# Patient Record
Sex: FEMALE | Race: BLACK OR AFRICAN AMERICAN | NOT HISPANIC OR LATINO | Employment: FULL TIME | URBAN - METROPOLITAN AREA
[De-identification: names, ages, dates, MRNs, and addresses within clinical notes are randomized per-mention and may not be internally consistent; named-entity substitution may affect disease eponyms.]

---

## 2017-02-14 ENCOUNTER — OFFICE VISIT (OUTPATIENT)
Dept: INTERNAL MEDICINE | Facility: CLINIC | Age: 47
End: 2017-02-14
Payer: COMMERCIAL

## 2017-02-14 VITALS
HEART RATE: 104 BPM | DIASTOLIC BLOOD PRESSURE: 82 MMHG | SYSTOLIC BLOOD PRESSURE: 128 MMHG | HEIGHT: 66 IN | WEIGHT: 293 LBS | TEMPERATURE: 98 F | OXYGEN SATURATION: 99 % | BODY MASS INDEX: 47.09 KG/M2

## 2017-02-14 DIAGNOSIS — R30.0 DYSURIA: Primary | ICD-10-CM

## 2017-02-14 DIAGNOSIS — I10 ESSENTIAL HYPERTENSION: ICD-10-CM

## 2017-02-14 LAB
BACTERIA #/AREA URNS HPF: ABNORMAL /HPF
BILIRUB UR QL STRIP: NEGATIVE
CLARITY UR: ABNORMAL
COLOR UR: YELLOW
GLUCOSE UR QL STRIP: NEGATIVE
HGB UR QL STRIP: ABNORMAL
KETONES UR QL STRIP: NEGATIVE
LEUKOCYTE ESTERASE UR QL STRIP: ABNORMAL
MICROSCOPIC COMMENT: ABNORMAL
NITRITE UR QL STRIP: NEGATIVE
NON-SQ EPI CELLS #/AREA URNS HPF: 1 /HPF
PH UR STRIP: 7 [PH] (ref 5–8)
PROT UR QL STRIP: NEGATIVE
RBC #/AREA URNS HPF: 3 /HPF (ref 0–4)
SP GR UR STRIP: 1.01 (ref 1–1.03)
SQUAMOUS #/AREA URNS HPF: 16 /HPF
URN SPEC COLLECT METH UR: ABNORMAL
WBC #/AREA URNS HPF: 25 /HPF (ref 0–5)

## 2017-02-14 PROCEDURE — 87088 URINE BACTERIA CULTURE: CPT

## 2017-02-14 PROCEDURE — 87186 SC STD MICRODIL/AGAR DIL: CPT

## 2017-02-14 PROCEDURE — 99999 PR PBB SHADOW E&M-EST. PATIENT-LVL IV: CPT | Mod: PBBFAC,,, | Performed by: PHYSICIAN ASSISTANT

## 2017-02-14 PROCEDURE — 87077 CULTURE AEROBIC IDENTIFY: CPT

## 2017-02-14 PROCEDURE — 99213 OFFICE O/P EST LOW 20 MIN: CPT | Mod: S$GLB,,, | Performed by: PHYSICIAN ASSISTANT

## 2017-02-14 PROCEDURE — 3074F SYST BP LT 130 MM HG: CPT | Mod: S$GLB,,, | Performed by: PHYSICIAN ASSISTANT

## 2017-02-14 PROCEDURE — 87086 URINE CULTURE/COLONY COUNT: CPT

## 2017-02-14 PROCEDURE — 81000 URINALYSIS NONAUTO W/SCOPE: CPT

## 2017-02-14 PROCEDURE — 3079F DIAST BP 80-89 MM HG: CPT | Mod: S$GLB,,, | Performed by: PHYSICIAN ASSISTANT

## 2017-02-14 RX ORDER — CIPROFLOXACIN 500 MG/1
500 TABLET ORAL 2 TIMES DAILY
Qty: 14 TABLET | Refills: 0 | Status: SHIPPED | OUTPATIENT
Start: 2017-02-14 | End: 2017-02-21

## 2017-02-14 RX ORDER — METOPROLOL SUCCINATE 50 MG/1
50 TABLET, EXTENDED RELEASE ORAL NIGHTLY
Qty: 30 TABLET | Refills: 1 | Status: SHIPPED | OUTPATIENT
Start: 2017-02-14 | End: 2017-06-01 | Stop reason: SDUPTHER

## 2017-02-14 NOTE — PROGRESS NOTES
Subjective:       Patient ID: Mary Russo is a 46 y.o. female.    Chief Complaint: Dysuria    Dysuria    This is a new problem. The current episode started in the past 7 days. The problem occurs every urination. The problem has been unchanged. The quality of the pain is described as burning. The pain is mild. There has been no fever. She is sexually active. There is no history of pyelonephritis. Associated symptoms include frequency and urgency. Pertinent negatives include no chills, discharge or flank pain. Associated symptoms comments: Cloudy urine. She has tried nothing for the symptoms.     Review of Systems   Constitutional: Negative for chills, fatigue and fever.   HENT: Negative.    Respiratory: Positive for cough. Negative for chest tightness, shortness of breath and wheezing.    Cardiovascular:        Patient report increased coughing with lisinopril. She desires something else for HTN   Gastrointestinal: Negative for abdominal pain.   Genitourinary: Positive for dysuria, frequency and urgency. Negative for enuresis, flank pain, menstrual problem, pelvic pain and vaginal discharge.       Objective:      Physical Exam   Constitutional: She appears well-developed and well-nourished. No distress.   Cardiovascular: Normal rate and regular rhythm.  Exam reveals no gallop and no friction rub.    No murmur heard.  Pulmonary/Chest: Effort normal and breath sounds normal. No respiratory distress. She has no wheezes. She has no rales. She exhibits no tenderness.   Abdominal: Soft. There is no tenderness.   Skin: She is not diaphoretic.   Nursing note and vitals reviewed.      Assessment:       1. Dysuria    2. Essential hypertension        Plan:       Dysuria  -     Urinalysis  -     Urine culture    Essential hypertension    Other orders  -     metoprolol succinate (TOPROL-XL) 50 MG 24 hr tablet; Take 1 tablet (50 mg total) by mouth nightly.  Dispense: 30 tablet; Refill: 1  -     ciprofloxacin HCl (CIPRO)  500 MG tablet; Take 1 tablet (500 mg total) by mouth 2 (two) times daily.  Dispense: 14 tablet; Refill: 0  -     Urinalysis Microscopic

## 2017-02-14 NOTE — MR AVS SNAPSHOT
[facility]  [address] [address]  Phone: [phone]  Fax: [phone]                  Mary Russo   2017 8:40 AM   Office Visit    Description:  Female : 1970   Provider:  Marek Craig III, PA-C   Department:  [facility]           Reason for Visit     Dysuria           Diagnoses this Visit        Comments    Dysuria    -  Primary     Essential hypertension                To Do List           Future Appointments        Provider Department Dept Phone    2017 8:40 AM Marek Craig III, PA-C [facility] [phone]    3/14/2017 7:20 AM Deloris Aiken MD [facility] [phone]      Goals (5 Years of Data)     None       These Medications        Disp Refills Start End    metoprolol succinate (TOPROL-XL) 50 MG 24 hr tablet 30 tablet 1 2017    Take 1 tablet (50 mg total) by mouth nightly. - Oral    Pharmacy: [facility]/pharmacy #[id number] [place] [id number] [facility] Ph #: [phone]       ciprofloxacin HCl (CIPRO) 500 MG tablet 14 tablet 0 2017    Take 1 tablet (500 mg total) by mouth 2 (two) times daily. - Oral    Pharmacy: [facility]/pharmacy #[id number] [place] [id number] [facility] Ph #: [phone]         [facility] On Call     [facility] On Call Nurse Care Line -  Assistance  Registered nurses in the [facility] provide clinical advisement, health education, appointment booking, and other advisory services.  Call for this free service at [phone].             Medications           Message regarding Medications     Verify the changes and/or additions to your medication regime listed below are the same as discussed with your clinician today.  If any of these changes or additions are incorrect, please notify your healthcare provider.        START taking these NEW  "medications        Refills    metoprolol succinate (TOPROL-XL) 50 MG 24 hr tablet 1    Sig: Take 1 tablet (50 mg total) by mouth nightly.    Class: Normal    Route: Oral    ciprofloxacin HCl (CIPRO) 500 MG tablet 0    Sig: Take 1 tablet (500 mg total) by mouth 2 (two) times daily.    Class: Normal    Route: Oral      STOP taking these medications     hydrocodone-acetaminophen 5-325mg (NORCO) 5-325 mg per tablet Take 1 tablet by mouth every 6 (six) hours as needed for Pain.    lisinopril (PRINIVIL,ZESTRIL) 20 MG tablet Take 1 tablet (20 mg total) by mouth once daily.           Verify that the below list of medications is an accurate representation of the medications you are currently taking.  If none reported, the list may be blank. If incorrect, please contact your healthcare provider. Carry this list with you in case of emergency.           Current Medications     buPROPion (WELLBUTRIN) 75 MG tablet Take 1 tablet (75 mg total) by mouth 2 (two) times daily.    ciprofloxacin HCl (CIPRO) 500 MG tablet Take 1 tablet (500 mg total) by mouth 2 (two) times daily.    fluticasone (FLONASE) 50 mcg/actuation nasal spray 2 sprays by Each Nare route once daily.    meloxicam (MOBIC) 15 MG tablet Take 1 tablet (15 mg total) by mouth once daily.    metoprolol succinate (TOPROL-XL) 50 MG 24 hr tablet Take 1 tablet (50 mg total) by mouth nightly.           Clinical Reference Information           Your Vitals Were     BP Pulse Temp Height    128/82 (BP Location: Left arm, Patient Position: Sitting, BP Method: Manual) 104 98.1 °F (36.7 °C) (Tympanic) 5' 6" (1.676 m)    Weight Last Period SpO2 BMI    137.4 kg (302 lb 14.6 oz) (Approximate) 99% 48.89 kg/m2      Blood Pressure          Most Recent Value    BP  128/82      Allergies as of 2/14/2017     Adhesive    Lisinopril    Latex, Natural Rubber      Immunizations Administered on Date of Encounter - 2/14/2017     None      Orders Placed During Today's Visit      Normal Orders This " Visit    Urinalysis     Urine culture       Instructions    Continue with antibiotics until gone. Increase water intake. May take tylenol as needed for fever. If your urine culture shows antibiotic resistance, we will notify you. Go to the Emergency Room if your symptoms become much worse. Otherwise, follow up with you PCP as scheduled.        Language Assistance Services     ATTENTION: Language assistance services are available, free of charge. Please call [phone].      ATENCIÓN: Si habla kerwin, tiene a joshi disposición servicios gratuitos de asistencia lingüística. Llame al [phone].     [facility] Ý: N?u b?n nói Ti?ng Vi?t, có các d?ch v? h? tr? ngôn ng? mi?n phí dành cho b?n. G?i s? [phone].         [facility] complies with applicable Federal civil rights laws and does not discriminate on the basis of race, color, national origin, age, disability, or sex.

## 2017-02-14 NOTE — PATIENT INSTRUCTIONS
Continue with antibiotics until gone. Increase water intake. May take tylenol as needed for fever. If your urine culture shows antibiotic resistance, we will notify you. Go to the Emergency Room if your symptoms become much worse. Otherwise, follow up with you PCP as scheduled.

## 2017-02-16 LAB — BACTERIA UR CULT: NORMAL

## 2017-03-01 ENCOUNTER — PATIENT OUTREACH (OUTPATIENT)
Dept: ADMINISTRATIVE | Facility: HOSPITAL | Age: 47
End: 2017-03-01
Payer: COMMERCIAL

## 2017-03-16 ENCOUNTER — PATIENT MESSAGE (OUTPATIENT)
Dept: INTERNAL MEDICINE | Facility: CLINIC | Age: 47
End: 2017-03-16

## 2017-03-16 RX ORDER — MELOXICAM 15 MG/1
15 TABLET ORAL DAILY
Qty: 30 TABLET | Refills: 0 | Status: SHIPPED | OUTPATIENT
Start: 2017-03-16 | End: 2017-07-11 | Stop reason: SDUPTHER

## 2017-04-03 ENCOUNTER — PATIENT MESSAGE (OUTPATIENT)
Dept: INTERNAL MEDICINE | Facility: CLINIC | Age: 47
End: 2017-04-03

## 2017-04-04 ENCOUNTER — OFFICE VISIT (OUTPATIENT)
Dept: FAMILY MEDICINE | Facility: CLINIC | Age: 47
End: 2017-04-04
Payer: COMMERCIAL

## 2017-04-04 VITALS
SYSTOLIC BLOOD PRESSURE: 142 MMHG | HEIGHT: 66 IN | DIASTOLIC BLOOD PRESSURE: 98 MMHG | TEMPERATURE: 96 F | OXYGEN SATURATION: 98 % | BODY MASS INDEX: 47.09 KG/M2 | HEART RATE: 99 BPM | RESPIRATION RATE: 18 BRPM | WEIGHT: 293 LBS

## 2017-04-04 DIAGNOSIS — J32.9 SINUSITIS, UNSPECIFIED CHRONICITY, UNSPECIFIED LOCATION: Primary | ICD-10-CM

## 2017-04-04 PROCEDURE — 1160F RVW MEDS BY RX/DR IN RCRD: CPT | Mod: S$GLB,,, | Performed by: REGISTERED NURSE

## 2017-04-04 PROCEDURE — 99213 OFFICE O/P EST LOW 20 MIN: CPT | Mod: S$GLB,,, | Performed by: REGISTERED NURSE

## 2017-04-04 PROCEDURE — 3080F DIAST BP >= 90 MM HG: CPT | Mod: S$GLB,,, | Performed by: REGISTERED NURSE

## 2017-04-04 PROCEDURE — 3077F SYST BP >= 140 MM HG: CPT | Mod: S$GLB,,, | Performed by: REGISTERED NURSE

## 2017-04-04 PROCEDURE — 99999 PR PBB SHADOW E&M-EST. PATIENT-LVL III: CPT | Mod: PBBFAC,,, | Performed by: REGISTERED NURSE

## 2017-04-04 RX ORDER — AMOXICILLIN AND CLAVULANATE POTASSIUM 875; 125 MG/1; MG/1
1 TABLET, FILM COATED ORAL 2 TIMES DAILY
Qty: 20 TABLET | Refills: 0 | Status: SHIPPED | OUTPATIENT
Start: 2017-04-04 | End: 2017-04-14

## 2017-04-04 NOTE — PROGRESS NOTES
"Subjective:       Patient ID: Mary Russo is a 46 y.o. female.    Chief Complaint: Chest Congestion and Otalgia    HPI     Mary is here today with c/o sinus issues x 3 to 4 weeks, waxing and waning.  She has been taking [place] sinus, Zyrtec and Allegra.  Reports HA, RT eye pain/pressure, dry cough, sweats, LT ear pain and sore throat.  Reports cough w/ increased chest congestion.    Review of Systems   Constitutional: Positive for diaphoresis. Negative for chills.   HENT: Positive for congestion, ear pain, postnasal drip, rhinorrhea, sinus pressure and sore throat. Negative for sneezing.    Eyes: Positive for pain. Negative for discharge and itching.   Respiratory: Positive for cough and shortness of breath. Negative for wheezing.    Cardiovascular: Negative.    Neurological: Positive for headaches. Negative for weakness, light-headedness and numbness.   Hematological: Negative for adenopathy.       Objective:       Vitals:    04/04/17 1313   BP: (!) 142/98   BP Location: Left arm   Patient Position: Sitting   BP Method: Manual   Pulse: 99   Resp: 18   Temp: 96.3 °F (35.7 °C)   TempSrc: Tympanic   SpO2: 98%   Weight: (!) 139 kg (306 lb 7 oz)   Height: 5' 6" (1.676 m)       Physical Exam   Constitutional: She is oriented to person, place, and time. She appears well-developed and well-nourished.   HENT:   Head: Normocephalic and atraumatic.   Right Ear: Tympanic membrane is bulging. Tympanic membrane is not injected, not perforated, not erythematous and not retracted.   Left Ear: Tympanic membrane is bulging. Tympanic membrane is not injected, not perforated, not erythematous and not retracted.   Nose: Mucosal edema and rhinorrhea present. Right sinus exhibits frontal sinus tenderness. Right sinus exhibits no maxillary sinus tenderness. Left sinus exhibits no maxillary sinus tenderness and no frontal sinus tenderness.   Mouth/Throat: No oropharyngeal exudate, posterior oropharyngeal edema or " posterior oropharyngeal erythema.   Eyes: Conjunctivae and EOM are normal. Pupils are equal, round, and reactive to light. Right eye exhibits no discharge. Left eye exhibits no discharge. No scleral icterus.   Cardiovascular: Normal rate, regular rhythm and normal heart sounds.    Pulmonary/Chest: Effort normal and breath sounds normal.   Lymphadenopathy:     She has no cervical adenopathy.   Neurological: She is alert and oriented to person, place, and time.   Vitals reviewed.      Assessment:       1. Sinusitis, unspecified chronicity, unspecified location        Plan:     Mary was seen today for chest congestion and otalgia.    Diagnoses and all orders for this visit:    Sinusitis, unspecified chronicity, unspecified location  -     amoxicillin-clavulanate 875-125mg (AUGMENTIN) 875-125 mg per tablet; Take 1 tablet by mouth 2 (two) times daily.      Symptomatic care, rest, fluids, hydration.  Follow-up in clinic as needed.

## 2017-04-04 NOTE — MR AVS SNAPSHOT
[facility]  [address]  [place] [address]  Phone: [phone]                  Mary Russo   2017 1:15 PM   Office Visit    Description:  Female : 1970   Provider:  Didier Weaver NP   Department:  [facility]           Reason for Visit     Chest Congestion     Otalgia           Diagnoses this Visit        Comments    Sinusitis, unspecified chronicity, unspecified location    -  Primary            To Do List           Goals (5 Years of Data)     None       These Medications        Disp Refills Start End    amoxicillin-clavulanate 875-125mg (AUGMENTIN) 875-125 mg per tablet 20 tablet 0 2017    Take 1 tablet by mouth 2 (two) times daily. - Oral    Pharmacy: [facility]/pharmacy #[id number] - [place], [place] - [address] [facility] [facility] #: [phone]         [facility] On Call     [facility] On Call Nurse Care Line -  Assistance  Unless otherwise directed by your provider, please contact [facility], our nurse care line that is available for  assistance.     Registered nurses in the [facility] provide: appointment scheduling, clinical advisement, health education, and other advisory services.  Call: [phone] (toll free)               Medications           Message regarding Medications     Verify the changes and/or additions to your medication regime listed below are the same as discussed with your clinician today.  If any of these changes or additions are incorrect, please notify your healthcare provider.        START taking these NEW medications        Refills    amoxicillin-clavulanate 875-125mg (AUGMENTIN) 875-125 mg per tablet 0    Sig: Take 1 tablet by mouth 2 (two) times daily.    Class: Normal    Route: Oral           Verify that the below list of medications is an accurate representation of the medications you are currently taking.  If none reported, the list may be  "blank. If incorrect, please contact your healthcare provider. Carry this list with you in case of emergency.           Current Medications     buPROPion (WELLBUTRIN) 75 MG tablet Take 1 tablet (75 mg total) by mouth 2 (two) times daily.    fluticasone (FLONASE) 50 mcg/actuation nasal spray 2 sprays by Each Nare route once daily.    meloxicam (MOBIC) 15 MG tablet Take 1 tablet (15 mg total) by mouth once daily.    metoprolol succinate (TOPROL-XL) 50 MG 24 hr tablet Take 1 tablet (50 mg total) by mouth nightly.    amoxicillin-clavulanate 875-125mg (AUGMENTIN) 875-125 mg per tablet Take 1 tablet by mouth 2 (two) times daily.           Clinical Reference Information           Your Vitals Were     BP Pulse Temp Resp Height Weight    142/98 (BP Location: Left arm, Patient Position: Sitting, BP Method: Manual) 99 96.3 °F (35.7 °C) (Tympanic) 18 5' 6" (1.676 m) 139 kg (306 lb 7 oz)    Last Period SpO2 BMI          03/27/2017 (Exact Date) 98% 49.46 kg/m2        Blood Pressure          Most Recent Value    BP  (!)  142/98      Allergies as of 4/4/2017     Adhesive    Lisinopril    Latex, Natural Rubber      Immunizations Administered on Date of Encounter - 4/4/2017     None      Language Assistance Services     ATTENTION: Language assistance services are available, free of charge. Please call [phone].      ATENCIÓN: Si habla español, tiene a joshi disposición servicios gratuitos de asistencia lingüística. Llame al [phone].     CHÚ Ý: N?u b?n nói Ti?ng Vi?t, có các d?ch v? h? tr? ngôn ng? mi?n phí dành cho b?n. G?i s? [phone].         [facility] complies with applicable Federal civil rights laws and does not discriminate on the basis of race, color, national origin, age, disability, or sex.        "

## 2017-04-04 NOTE — LETTER
April 4, 2017      [facility]  [address] [address]  Phone: [phone]       Patient: Mary Russo   YOB: 1970  Date of Visit: 04/04/2017    To Whom It May Concern:    Mary  was at [facility] on 04/04/2017. She may return to work on 04/06/2017 with no restrictions. If you have any questions or concerns, or if I can be of further assistance, please do not hesitate to contact me.    Sincerely,    Didier Weaver NP

## 2017-04-05 RX ORDER — FLUTICASONE PROPIONATE 50 MCG
2 SPRAY, SUSPENSION (ML) NASAL DAILY
Qty: 1 BOTTLE | Refills: 0 | Status: SHIPPED | OUTPATIENT
Start: 2017-04-05 | End: 2018-06-21

## 2017-04-21 ENCOUNTER — PATIENT MESSAGE (OUTPATIENT)
Dept: OBSTETRICS AND GYNECOLOGY | Facility: CLINIC | Age: 47
End: 2017-04-21

## 2017-05-01 ENCOUNTER — PATIENT MESSAGE (OUTPATIENT)
Dept: INTERNAL MEDICINE | Facility: CLINIC | Age: 47
End: 2017-05-01

## 2017-06-02 ENCOUNTER — OFFICE VISIT (OUTPATIENT)
Dept: FAMILY MEDICINE | Facility: CLINIC | Age: 47
End: 2017-06-02
Payer: COMMERCIAL

## 2017-06-02 ENCOUNTER — PATIENT MESSAGE (OUTPATIENT)
Dept: FAMILY MEDICINE | Facility: CLINIC | Age: 47
End: 2017-06-02

## 2017-06-02 VITALS
OXYGEN SATURATION: 98 % | TEMPERATURE: 98 F | BODY MASS INDEX: 47.09 KG/M2 | WEIGHT: 293 LBS | RESPIRATION RATE: 18 BRPM | HEART RATE: 110 BPM | SYSTOLIC BLOOD PRESSURE: 160 MMHG | HEIGHT: 66 IN | DIASTOLIC BLOOD PRESSURE: 108 MMHG

## 2017-06-02 DIAGNOSIS — S29.011A CHEST WALL MUSCLE STRAIN, INITIAL ENCOUNTER: ICD-10-CM

## 2017-06-02 DIAGNOSIS — I10 ESSENTIAL HYPERTENSION: ICD-10-CM

## 2017-06-02 DIAGNOSIS — S39.012A STRAIN OF LUMBAR REGION, INITIAL ENCOUNTER: Primary | ICD-10-CM

## 2017-06-02 PROCEDURE — 99214 OFFICE O/P EST MOD 30 MIN: CPT | Mod: S$GLB,,, | Performed by: REGISTERED NURSE

## 2017-06-02 PROCEDURE — 99999 PR PBB SHADOW E&M-EST. PATIENT-LVL IV: CPT | Mod: PBBFAC,,, | Performed by: REGISTERED NURSE

## 2017-06-02 RX ORDER — IBUPROFEN 800 MG/1
800 TABLET ORAL 3 TIMES DAILY PRN
Qty: 90 TABLET | Refills: 0 | Status: SHIPPED | OUTPATIENT
Start: 2017-06-02 | End: 2018-03-13

## 2017-06-02 RX ORDER — METOPROLOL SUCCINATE 50 MG/1
50 TABLET, EXTENDED RELEASE ORAL NIGHTLY
Qty: 30 TABLET | Refills: 1 | Status: SHIPPED | OUTPATIENT
Start: 2017-06-02 | End: 2017-08-05 | Stop reason: SDUPTHER

## 2017-06-02 RX ORDER — TIZANIDINE 4 MG/1
4 TABLET ORAL 3 TIMES DAILY PRN
Qty: 60 TABLET | Refills: 0 | Status: SHIPPED | OUTPATIENT
Start: 2017-06-02 | End: 2018-03-13

## 2017-06-02 RX ORDER — PREDNISONE 50 MG/1
50 TABLET ORAL DAILY
Qty: 5 TABLET | Refills: 0 | Status: SHIPPED | OUTPATIENT
Start: 2017-06-02 | End: 2017-06-07

## 2017-06-03 ENCOUNTER — PATIENT MESSAGE (OUTPATIENT)
Dept: FAMILY MEDICINE | Facility: CLINIC | Age: 47
End: 2017-06-03

## 2017-06-05 NOTE — PROGRESS NOTES
"Subjective:       Patient ID: Mary Ozuna Rafaela is a 47 y.o. female.    Chief Complaint: Chest Pain and Back Pain      HPI    Mary is here today with c/o chest and back pain.  Mother fell to the ground a few weeks ago; she tried picking her up along with 3 other people but ended up pulling her back out at that time.  Reports she was "dead weight".  Since that time, she has been having pain in her back off to the RT side, radiating down the RT leg to foot.  Pain to chest wall at times that increases with movement, touch and deep breathing.  She has tried hot tub soaks, Advil and massage.     Review of Systems   Constitutional: Positive for activity change (at times w/ pain).   Respiratory: Negative.    Cardiovascular: Positive for chest pain. Negative for palpitations and leg swelling.   Musculoskeletal: Positive for back pain. Negative for gait problem, joint swelling and neck pain.   Neurological: Negative.          Patient Active Problem List   Diagnosis    Dysphagia    Morbid obesity with BMI of 45.0-49.9, adult    Arthritis of both knees    Essential hypertension         Objective:       Vitals:    06/02/17 1117 06/02/17 1118   BP: (!) 160/108 --- no BP med x 4 days    Pulse: 110    Resp: 18    Temp: 97.8 °F (36.6 °C)    TempSrc: Tympanic    SpO2: 98%    Weight: (!) 140.8 kg (310 lb 6.5 oz)    Height: 5' 6" (1.676 m)    PainSc:   6   5   PainLoc: Back Chest       Physical Exam   Constitutional: She is oriented to person, place, and time. She appears well-developed and well-nourished.   Cardiovascular: Regular rhythm and normal heart sounds.  Tachycardia present.    Pulmonary/Chest: Effort normal and breath sounds normal. She exhibits tenderness. She exhibits no crepitus, no edema, no deformity, no swelling and no retraction.       Musculoskeletal: Normal range of motion. She exhibits no edema or deformity.        Lumbar back: She exhibits tenderness, pain and spasm. She exhibits normal range of " motion, no swelling, no edema, no deformity and normal pulse.        Back:    Neurological: She is alert and oriented to person, place, and time. She has normal strength and normal reflexes. She displays normal reflexes. No cranial nerve deficit or sensory deficit. She exhibits normal muscle tone. Coordination and gait normal.   Vitals reviewed.        Medication List with Changes/Refills   New Medications    IBUPROFEN (ADVIL,MOTRIN) 800 MG TABLET    Take 1 tablet (800 mg total) by mouth 3 (three) times daily as needed for Pain.    PREDNISONE (DELTASONE) 50 MG TAB    Take 1 tablet (50 mg total) by mouth once daily.    TIZANIDINE (ZANAFLEX) 4 MG TABLET    Take 1 tablet (4 mg total) by mouth 3 (three) times daily as needed.   Current Medications    BUPROPION (WELLBUTRIN) 75 MG TABLET    Take 1 tablet (75 mg total) by mouth 2 (two) times daily.    FLUTICASONE (FLONASE) 50 MCG/ACTUATION NASAL SPRAY    2 sprays by Each Nare route once daily. Needs to be seen prior to additional refills    MELOXICAM (MOBIC) 15 MG TABLET    Take 1 tablet (15 mg total) by mouth once daily.    METOPROLOL SUCCINATE (TOPROL-XL) 50 MG 24 HR TABLET    TAKE 1 TABLET (50 MG TOTAL) BY MOUTH NIGHTLY.           Diagnosis       1. Strain of lumbar region, initial encounter    2. Chest wall muscle strain, initial encounter    3. Essential hypertension          Assessment/ Plan     Strain of lumbar region, initial encounter  -     predniSONE (DELTASONE) 50 MG Tab; Take 1 tablet (50 mg total) by mouth once daily.  Dispense: 5 tablet; Refill: 0  -     ibuprofen (ADVIL,MOTRIN) 800 MG tablet; Take 1 tablet (800 mg total) by mouth 3 (three) times daily as needed for Pain.  Dispense: 90 tablet; Refill: 0  -     tizanidine (ZANAFLEX) 4 MG tablet; Take 1 tablet (4 mg total) by mouth 3 (three) times daily as needed.  Dispense: 60 tablet; Refill: 0    Chest wall muscle strain, initial encounter  -     predniSONE (DELTASONE) 50 MG Tab; Take 1 tablet (50 mg total)  by mouth once daily.  Dispense: 5 tablet; Refill: 0  -     ibuprofen (ADVIL,MOTRIN) 800 MG tablet; Take 1 tablet (800 mg total) by mouth 3 (three) times daily as needed for Pain.  Dispense: 90 tablet; Refill: 0  -     tizanidine (ZANAFLEX) 4 MG tablet; Take 1 tablet (4 mg total) by mouth 3 (three) times daily as needed.  Dispense: 60 tablet; Refill: 0    Essential hypertension        -     BP elevated today, has not taken meds in the past 4 days.        -     Medication was refilled this AM per PCP.      Ice/heat, rest, gentle ROM/stretching exercises.  Follow-up in clinic as needed.        HALIE Tristan  [facility]

## 2017-06-19 ENCOUNTER — PATIENT OUTREACH (OUTPATIENT)
Dept: ADMINISTRATIVE | Facility: HOSPITAL | Age: 47
End: 2017-06-19
Payer: COMMERCIAL

## 2017-06-19 NOTE — PROGRESS NOTES
I have attempted without success to contact this patient to schedule an appointment for blood pressure recheck, left voicemail.

## 2017-06-19 NOTE — PROGRESS NOTES
Schedule patient for blood pressure recheck on 06/22/17 and mammogram on 07/12/17. Patient in agreement and vocalize understanding.

## 2017-07-11 RX ORDER — MELOXICAM 15 MG/1
15 TABLET ORAL DAILY
Qty: 30 TABLET | Refills: 0 | Status: SHIPPED | OUTPATIENT
Start: 2017-07-11 | End: 2018-03-13

## 2017-08-07 RX ORDER — METOPROLOL SUCCINATE 50 MG/1
50 TABLET, EXTENDED RELEASE ORAL NIGHTLY
Qty: 30 TABLET | Refills: 1 | Status: SHIPPED | OUTPATIENT
Start: 2017-08-07 | End: 2017-09-27 | Stop reason: SDUPTHER

## 2017-08-22 RX ORDER — MELOXICAM 15 MG/1
TABLET ORAL
Qty: 30 TABLET | Refills: 0 | OUTPATIENT
Start: 2017-08-22

## 2017-08-30 DIAGNOSIS — F41.9 ANXIETY: ICD-10-CM

## 2017-08-30 RX ORDER — BUPROPION HYDROCHLORIDE 75 MG/1
TABLET ORAL
Qty: 180 TABLET | Refills: 0 | OUTPATIENT
Start: 2017-08-30

## 2017-08-31 NOTE — TELEPHONE ENCOUNTER
Patient last seen over 1 year ago, needs appointment prior to refill. Please assist with scheduling.

## 2017-09-07 ENCOUNTER — PATIENT MESSAGE (OUTPATIENT)
Dept: FAMILY MEDICINE | Facility: CLINIC | Age: 47
End: 2017-09-07

## 2017-09-07 ENCOUNTER — OFFICE VISIT (OUTPATIENT)
Dept: FAMILY MEDICINE | Facility: CLINIC | Age: 47
End: 2017-09-07
Payer: COMMERCIAL

## 2017-09-07 VITALS
RESPIRATION RATE: 17 BRPM | OXYGEN SATURATION: 99 % | SYSTOLIC BLOOD PRESSURE: 120 MMHG | DIASTOLIC BLOOD PRESSURE: 86 MMHG | TEMPERATURE: 97 F | WEIGHT: 293 LBS | HEART RATE: 95 BPM | HEIGHT: 66 IN | BODY MASS INDEX: 47.09 KG/M2

## 2017-09-07 DIAGNOSIS — R39.9 UTI SYMPTOMS: ICD-10-CM

## 2017-09-07 DIAGNOSIS — N39.0 ACUTE UTI (URINARY TRACT INFECTION): Primary | ICD-10-CM

## 2017-09-07 LAB
BILIRUB SERPL-MCNC: NEGATIVE MG/DL
BLOOD URINE, POC: 50
COLOR, POC UA: YELLOW
GLUCOSE UR QL STRIP: NORMAL
KETONES UR QL STRIP: NEGATIVE
LEUKOCYTE ESTERASE URINE, POC: ABNORMAL
NITRITE, POC UA: POSITIVE
PH, POC UA: 8
PROTEIN, POC: ABNORMAL
SPECIFIC GRAVITY, POC UA: 1.02
UROBILINOGEN, POC UA: NORMAL

## 2017-09-07 PROCEDURE — 87086 URINE CULTURE/COLONY COUNT: CPT

## 2017-09-07 PROCEDURE — 87088 URINE BACTERIA CULTURE: CPT

## 2017-09-07 PROCEDURE — 3074F SYST BP LT 130 MM HG: CPT | Mod: S$GLB,,, | Performed by: REGISTERED NURSE

## 2017-09-07 PROCEDURE — 99213 OFFICE O/P EST LOW 20 MIN: CPT | Mod: 25,S$GLB,, | Performed by: REGISTERED NURSE

## 2017-09-07 PROCEDURE — 81002 URINALYSIS NONAUTO W/O SCOPE: CPT | Mod: S$GLB,,, | Performed by: REGISTERED NURSE

## 2017-09-07 PROCEDURE — 3079F DIAST BP 80-89 MM HG: CPT | Mod: S$GLB,,, | Performed by: REGISTERED NURSE

## 2017-09-07 PROCEDURE — 87077 CULTURE AEROBIC IDENTIFY: CPT

## 2017-09-07 PROCEDURE — 99999 PR PBB SHADOW E&M-EST. PATIENT-LVL III: CPT | Mod: PBBFAC,,, | Performed by: REGISTERED NURSE

## 2017-09-07 PROCEDURE — 3008F BODY MASS INDEX DOCD: CPT | Mod: S$GLB,,, | Performed by: REGISTERED NURSE

## 2017-09-07 PROCEDURE — 87186 SC STD MICRODIL/AGAR DIL: CPT

## 2017-09-07 RX ORDER — FLUCONAZOLE 150 MG/1
150 TABLET ORAL DAILY
Qty: 1 TABLET | Refills: 0 | Status: SHIPPED | OUTPATIENT
Start: 2017-09-07 | End: 2017-09-08

## 2017-09-07 RX ORDER — SULFAMETHOXAZOLE AND TRIMETHOPRIM 800; 160 MG/1; MG/1
1 TABLET ORAL 2 TIMES DAILY
Qty: 20 TABLET | Refills: 0 | Status: SHIPPED | OUTPATIENT
Start: 2017-09-07 | End: 2017-09-17

## 2017-09-07 NOTE — PROGRESS NOTES
"Subjective:       Patient ID: Mary Russo is a 47 y.o. female.    Chief Complaint: Urinary Tract Infection      HPI    Mary is here today with c/o UTI with symptoms x 1-1/2 weeks.  Reports dysuria, frequency, urgency, pelvic pain and only able to void in small amounts.  Admits to decreased food and fluids due to not taking care of herself, mother passed away recently.  Azo helps temporarily.      Review of Systems   Constitutional: Negative for chills and fever.   Gastrointestinal: Positive for abdominal pain. Negative for constipation, diarrhea, nausea and vomiting.   Genitourinary: Positive for dysuria, frequency, pelvic pain and urgency. Negative for flank pain and hematuria.   Neurological: Negative for headaches.         Patient Active Problem List   Diagnosis    Morbid obesity with BMI of 45.0-49.9, adult    Arthritis of both knees    Essential hypertension         Objective:     Vitals:    09/07/17 0903   BP: 120/86   Pulse: 95   Resp: 17   Temp: 97.2 °F (36.2 °C)   TempSrc: Tympanic   SpO2: 99%   Weight: (!) 140.5 kg (309 lb 11.9 oz)   Height: 5' 6" (1.676 m)   PainSc: 0-No pain       Physical Exam   Constitutional: She is oriented to person, place, and time. She appears well-developed and well-nourished.   Abdominal: There is tenderness in the suprapubic area. There is no CVA tenderness.   Neurological: She is alert and oriented to person, place, and time.   Vitals reviewed.        Medication List with Changes/Refills   New Medications    SULFAMETHOXAZOLE-TRIMETHOPRIM 800-160MG (BACTRIM DS) 800-160 MG TAB    Take 1 tablet by mouth 2 (two) times daily.   Current Medications    BUPROPION (WELLBUTRIN) 75 MG TABLET    Take 1 tablet (75 mg total) by mouth 2 (two) times daily.    FLUTICASONE (FLONASE) 50 MCG/ACTUATION NASAL SPRAY    2 sprays by Each Nare route once daily. Needs to be seen prior to additional refills    IBUPROFEN (ADVIL,MOTRIN) 800 MG TABLET    Take 1 tablet (800 mg total) by " mouth 3 (three) times daily as needed for Pain.    MELOXICAM (MOBIC) 15 MG TABLET    Take 1 tablet (15 mg total) by mouth once daily.    METOPROLOL SUCCINATE (TOPROL-XL) 50 MG 24 HR TABLET    TAKE 1 TABLET (50 MG TOTAL) BY MOUTH NIGHTLY.    TIZANIDINE (ZANAFLEX) 4 MG TABLET    Take 1 tablet (4 mg total) by mouth 3 (three) times daily as needed.         Component      Latest Ref Rng & Units 9/7/2017   Color, UA       yellow   Spec Grav UA       1.020   pH, UA       8   WBC, UA       2+   Nitrite, UA       positive   Protein       trace   Glucose, UA       normal   Ketones, UA       negative   Urobilinogen, UA       normal   Bilirubin       negative   RBC, UA       50       Diagnosis       1. Acute UTI (urinary tract infection)    2. UTI symptoms          Assessment/ Plan     Acute UTI (urinary tract infection)  -     sulfamethoxazole-trimethoprim 800-160mg (BACTRIM DS) 800-160 mg Tab; Take 1 tablet by mouth 2 (two) times daily.  Dispense: 20 tablet; Refill: 0  -     Urine culture    UTI symptoms  -     POCT urine dipstick without microscope  -     sulfamethoxazole-trimethoprim 800-160mg (BACTRIM DS) 800-160 mg Tab; Take 1 tablet by mouth 2 (two) times daily.  Dispense: 20 tablet; Refill: 0        Infection triggers, treatment and prevention discussed.  Azo as needed.  Urine culture pending.  Follow-up in clinic as needed.      HALIE Tristan  [facility]

## 2017-09-11 LAB — BACTERIA UR CULT: NORMAL

## 2017-09-27 ENCOUNTER — OFFICE VISIT (OUTPATIENT)
Dept: INTERNAL MEDICINE | Facility: CLINIC | Age: 47
End: 2017-09-27
Payer: COMMERCIAL

## 2017-09-27 ENCOUNTER — LAB VISIT (OUTPATIENT)
Dept: LAB | Facility: HOSPITAL | Age: 47
End: 2017-09-27
Payer: COMMERCIAL

## 2017-09-27 VITALS
WEIGHT: 293 LBS | SYSTOLIC BLOOD PRESSURE: 136 MMHG | TEMPERATURE: 98 F | OXYGEN SATURATION: 98 % | BODY MASS INDEX: 47.09 KG/M2 | DIASTOLIC BLOOD PRESSURE: 84 MMHG | HEIGHT: 66 IN | HEART RATE: 62 BPM

## 2017-09-27 DIAGNOSIS — Z00.00 ROUTINE GENERAL MEDICAL EXAMINATION AT A HEALTH CARE FACILITY: ICD-10-CM

## 2017-09-27 DIAGNOSIS — E66.01 MORBID OBESITY WITH BMI OF 50.0-59.9, ADULT: ICD-10-CM

## 2017-09-27 DIAGNOSIS — Z13.220 SCREENING FOR LIPOID DISORDERS: ICD-10-CM

## 2017-09-27 DIAGNOSIS — F41.9 ANXIETY AND DEPRESSION: ICD-10-CM

## 2017-09-27 DIAGNOSIS — F32.A ANXIETY AND DEPRESSION: ICD-10-CM

## 2017-09-27 DIAGNOSIS — Z00.00 ROUTINE GENERAL MEDICAL EXAMINATION AT A HEALTH CARE FACILITY: Primary | ICD-10-CM

## 2017-09-27 DIAGNOSIS — J30.2 SEASONAL ALLERGIC RHINITIS, UNSPECIFIED CHRONICITY, UNSPECIFIED TRIGGER: ICD-10-CM

## 2017-09-27 DIAGNOSIS — I10 ESSENTIAL HYPERTENSION: ICD-10-CM

## 2017-09-27 DIAGNOSIS — Z12.31 ENCOUNTER FOR SCREENING MAMMOGRAM FOR BREAST CANCER: ICD-10-CM

## 2017-09-27 DIAGNOSIS — Z13.29 THYROID DISORDER SCREEN: ICD-10-CM

## 2017-09-27 LAB
ALBUMIN SERPL BCP-MCNC: 2.8 G/DL
ALP SERPL-CCNC: 71 U/L
ALT SERPL W/O P-5'-P-CCNC: 5 U/L
ANION GAP SERPL CALC-SCNC: 7 MMOL/L
AST SERPL-CCNC: 13 U/L
BASOPHILS # BLD AUTO: 0.06 K/UL
BASOPHILS NFR BLD: 0.9 %
BILIRUB SERPL-MCNC: 0.5 MG/DL
BUN SERPL-MCNC: 9 MG/DL
CALCIUM SERPL-MCNC: 9.1 MG/DL
CHLORIDE SERPL-SCNC: 106 MMOL/L
CHOLEST SERPL-MCNC: 177 MG/DL
CHOLEST/HDLC SERPL: 3.8 {RATIO}
CO2 SERPL-SCNC: 26 MMOL/L
CREAT SERPL-MCNC: 0.9 MG/DL
DIFFERENTIAL METHOD: ABNORMAL
EOSINOPHIL # BLD AUTO: 0.3 K/UL
EOSINOPHIL NFR BLD: 4.1 %
ERYTHROCYTE [DISTWIDTH] IN BLOOD BY AUTOMATED COUNT: 14 %
EST. GFR  (AFRICAN AMERICAN): >60 ML/MIN/1.73 M^2
EST. GFR  (NON AFRICAN AMERICAN): >60 ML/MIN/1.73 M^2
GLUCOSE SERPL-MCNC: 84 MG/DL
HCT VFR BLD AUTO: 35.8 %
HDLC SERPL-MCNC: 47 MG/DL
HDLC SERPL: 26.6 %
HGB BLD-MCNC: 11.2 G/DL
LDLC SERPL CALC-MCNC: 116.4 MG/DL
LYMPHOCYTES # BLD AUTO: 2.1 K/UL
LYMPHOCYTES NFR BLD: 31 %
MCH RBC QN AUTO: 28.2 PG
MCHC RBC AUTO-ENTMCNC: 31.3 G/DL
MCV RBC AUTO: 90 FL
MONOCYTES # BLD AUTO: 0.4 K/UL
MONOCYTES NFR BLD: 6 %
NEUTROPHILS # BLD AUTO: 3.9 K/UL
NEUTROPHILS NFR BLD: 57.9 %
NONHDLC SERPL-MCNC: 130 MG/DL
PLATELET # BLD AUTO: 363 K/UL
PMV BLD AUTO: 10.7 FL
POTASSIUM SERPL-SCNC: 4.5 MMOL/L
PROT SERPL-MCNC: 7.2 G/DL
RBC # BLD AUTO: 3.97 M/UL
SODIUM SERPL-SCNC: 139 MMOL/L
TRIGL SERPL-MCNC: 68 MG/DL
TSH SERPL DL<=0.005 MIU/L-ACNC: 0.94 UIU/ML
WBC # BLD AUTO: 6.8 K/UL

## 2017-09-27 PROCEDURE — 84443 ASSAY THYROID STIM HORMONE: CPT

## 2017-09-27 PROCEDURE — 36415 COLL VENOUS BLD VENIPUNCTURE: CPT

## 2017-09-27 PROCEDURE — 85025 COMPLETE CBC W/AUTO DIFF WBC: CPT

## 2017-09-27 PROCEDURE — 80061 LIPID PANEL: CPT

## 2017-09-27 PROCEDURE — 99396 PREV VISIT EST AGE 40-64: CPT | Mod: S$GLB,,, | Performed by: FAMILY MEDICINE

## 2017-09-27 PROCEDURE — 99999 PR PBB SHADOW E&M-EST. PATIENT-LVL IV: CPT | Mod: PBBFAC,,, | Performed by: FAMILY MEDICINE

## 2017-09-27 PROCEDURE — 80053 COMPREHEN METABOLIC PANEL: CPT

## 2017-09-27 RX ORDER — BUPROPION HYDROCHLORIDE 75 MG/1
75 TABLET ORAL 2 TIMES DAILY
Qty: 180 TABLET | Refills: 1 | Status: SHIPPED | OUTPATIENT
Start: 2017-09-27 | End: 2018-05-11 | Stop reason: SDUPTHER

## 2017-09-27 RX ORDER — METOPROLOL SUCCINATE 50 MG/1
50 TABLET, EXTENDED RELEASE ORAL NIGHTLY
Qty: 90 TABLET | Refills: 1 | Status: SHIPPED | OUTPATIENT
Start: 2017-09-27 | End: 2018-05-11 | Stop reason: SDUPTHER

## 2017-09-27 NOTE — PROGRESS NOTES
Subjective:       Patient ID: Mary Russo is a 47 y.o. female.    Chief Complaint: Annual Exam    Patient presents to clinic today for annual physical exam. She requests to resume wellbutrin, she has been out. She reports increased stress over last few months, her mother recently passed away.      Review of Systems   Constitutional: Negative for activity change and unexpected weight change.   HENT: Negative for hearing loss, rhinorrhea and trouble swallowing.    Eyes: Negative for discharge and visual disturbance.   Respiratory: Negative for chest tightness and wheezing.    Cardiovascular: Negative for chest pain and palpitations.   Gastrointestinal: Negative for blood in stool, constipation, diarrhea and vomiting.   Endocrine: Negative for polydipsia and polyuria.   Genitourinary: Positive for menstrual problem. Negative for difficulty urinating, dysuria and hematuria.   Musculoskeletal: Negative for arthralgias, joint swelling and neck pain.   Neurological: Negative for weakness and headaches.   Psychiatric/Behavioral: Positive for dysphoric mood. Negative for confusion.       Objective:      Physical Exam   Constitutional: She is oriented to person, place, and time. Vital signs are normal. She appears well-developed and well-nourished. No distress.   HENT:   Head: Normocephalic and atraumatic.   Right Ear: Tympanic membrane, external ear and ear canal normal.   Left Ear: Tympanic membrane, external ear and ear canal normal.   Nose: Mucosal edema and rhinorrhea present.   Mouth/Throat: Uvula is midline, oropharynx is clear and moist and mucous membranes are normal.   Pale, boggy nasal turbinates with clear rhinorrhea   Eyes: Conjunctivae, EOM and lids are normal. Pupils are equal, round, and reactive to light. Right eye exhibits no discharge. Left eye exhibits no discharge.   Neck: Normal range of motion. Neck supple. No thyromegaly present.   Cardiovascular: Normal rate and regular rhythm.  Exam reveals  no gallop and no friction rub.    No murmur heard.  Pulmonary/Chest: Effort normal and breath sounds normal. No respiratory distress. She has no wheezes. She has no rhonchi. She has no rales.   Abdominal: Soft. Normal appearance and bowel sounds are normal. She exhibits no distension and no mass. There is no hepatosplenomegaly. There is no tenderness.   Musculoskeletal: Normal range of motion.   Lymphadenopathy:     She has no cervical adenopathy.   Neurological: She is alert and oriented to person, place, and time. She has normal strength. No cranial nerve deficit or sensory deficit. Gait normal.   Reflex Scores:       Patellar reflexes are 2+ on the right side and 2+ on the left side.  Skin: Skin is warm and dry. No lesion and no rash noted. No cyanosis. Nails show no clubbing.   Psychiatric: She has a normal mood and affect.   Vitals reviewed.      Assessment:       1. Routine general medical examination at a health care facility    2. Essential hypertension    3. Anxiety and depression    4. Morbid obesity with BMI of 50.0-59.9, adult    5. Encounter for screening mammogram for breast cancer    6. Seasonal allergic rhinitis, unspecified chronicity, unspecified trigger        Plan:     Problem List Items Addressed This Visit     Essential hypertension    Current Assessment & Plan     Controlled, continue metoprolol         Relevant Medications    metoprolol succinate (TOPROL-XL) 50 MG 24 hr tablet    Morbid obesity with BMI of 50.0-59.9, adult    Current Assessment & Plan     Discussed the importance of weight loss in preventing chronic disease and cancers. Advised lifestyle modifications including diet and exercise to lose weight. Patient expressed understanding.           Anxiety and depression    Current Assessment & Plan     Resume wellbutrin         Relevant Medications    buPROPion (WELLBUTRIN) 75 MG tablet    Seasonal allergic rhinitis    Current Assessment & Plan     Advised OTC claritin and flonase daily  prn           Other Visit Diagnoses     Routine general medical examination at a health care facility    -  Primary    Encounter for screening mammogram for breast cancer        Relevant Orders    Mammo Digital Screening Bilat with CAD          Health Maintenance reviewed/updated. Declines flu vaccine.  Female health screenings per OB/GYN - [facility].

## 2017-09-27 NOTE — PATIENT INSTRUCTIONS
Losing Weight (Cardiovascular)  Excess weight is a major risk factor for heart disease. Losing weight may help keep your arteries open so that your heart can get the oxygen-rich blood it needs. Weight loss can also help lower your blood pressure and reduce your risk for diabetes. All in all, losing weight makes you healthier.      Exercise with a friend. When activity is fun, you're more likely to stick with it.    Calories and Weight Loss  · Calories are the fuel your body burns for energy. You get the calories you need from the food you eat. For healthy weight loss, women should eat at least 1,200 calories a day, men at least 1,500.  · When you eat more calories than you need, your body stores the extra calories as fat. One pound of fat equals 3,500 calories.  · To lose weight, try to burn 500 calories a day more than you eat. To do this, eat 250 calories less each day. Add activity to burn the other 250 calories. Walking 21/2 miles burns about 250 calories.  · Eat a variety of healthy foods. Its the best way to make calories count.  Tips for losing weight:   · Drink 8 to 10 glasses of water a day.  · Dont skip meals. Instead, eat smaller portions.    Brisk Activity Is Best  Brisk activity gets your heart pumping faster. It makes your heart healthier. Its also the best way to burn calories. In fact, your body may keep burning calories for hours after you stop a brisk activity.  · Begin by walking 10 minutes most days.  · Add more time and speed to your walk. Build up as you feel able.  · Try to walk briskly at least 30 minutes most days. If needed, you can break this into 2 shorter sessions.  Check off the ideas below that you could try to make your day more active:   · Take the stairs instead of the elevator.  · Park your car farther away and walk.  · Ride a bike to work or to the store.  · Walk laps around the mall.       © 1317-4220 [employer], [address] [facility], [place] [address]. All rights  reserved. This information is not intended as a substitute for professional medical care. Always follow your healthcare professional's instructions.

## 2017-10-10 ENCOUNTER — HOSPITAL ENCOUNTER (OUTPATIENT)
Dept: RADIOLOGY | Facility: HOSPITAL | Age: 47
Discharge: HOME | End: 2017-10-10
Attending: FAMILY MEDICINE
Payer: COMMERCIAL

## 2017-10-10 DIAGNOSIS — Z12.31 ENCOUNTER FOR SCREENING MAMMOGRAM FOR BREAST CANCER: ICD-10-CM

## 2017-10-10 PROCEDURE — 77067 SCR MAMMO BI INCL CAD: CPT | Mod: 26,,, | Performed by: RADIOLOGY

## 2017-10-10 PROCEDURE — 77067 SCR MAMMO BI INCL CAD: CPT | Mod: TC

## 2017-12-14 ENCOUNTER — PATIENT MESSAGE (OUTPATIENT)
Dept: INTERNAL MEDICINE | Facility: CLINIC | Age: 47
End: 2017-12-14

## 2018-02-13 ENCOUNTER — PATIENT MESSAGE (OUTPATIENT)
Dept: INTERNAL MEDICINE | Facility: CLINIC | Age: 48
End: 2018-02-13

## 2018-02-13 DIAGNOSIS — N39.0 URINARY TRACT INFECTION WITHOUT HEMATURIA, SITE UNSPECIFIED: Primary | ICD-10-CM

## 2018-02-14 ENCOUNTER — OFFICE VISIT (OUTPATIENT)
Dept: INTERNAL MEDICINE | Facility: CLINIC | Age: 48
End: 2018-02-14
Payer: COMMERCIAL

## 2018-02-14 VITALS
DIASTOLIC BLOOD PRESSURE: 72 MMHG | HEIGHT: 66 IN | BODY MASS INDEX: 47.09 KG/M2 | WEIGHT: 293 LBS | HEART RATE: 73 BPM | TEMPERATURE: 97 F | SYSTOLIC BLOOD PRESSURE: 126 MMHG

## 2018-02-14 DIAGNOSIS — R10.12 ABDOMINAL PAIN, LEFT UPPER QUADRANT: Primary | ICD-10-CM

## 2018-02-14 DIAGNOSIS — K21.9 GASTROESOPHAGEAL REFLUX DISEASE, ESOPHAGITIS PRESENCE NOT SPECIFIED: ICD-10-CM

## 2018-02-14 PROCEDURE — 99214 OFFICE O/P EST MOD 30 MIN: CPT | Mod: S$GLB,,, | Performed by: FAMILY MEDICINE

## 2018-02-14 PROCEDURE — 99999 PR PBB SHADOW E&M-EST. PATIENT-LVL III: CPT | Mod: PBBFAC,,, | Performed by: FAMILY MEDICINE

## 2018-02-14 PROCEDURE — 3008F BODY MASS INDEX DOCD: CPT | Mod: S$GLB,,, | Performed by: FAMILY MEDICINE

## 2018-02-14 NOTE — PROGRESS NOTES
Subjective:       Patient ID: Mary Russo is a 47 y.o. female.    Chief Complaint: Abdominal Pain (since lapband removal) and Gastroesophageal Reflux    Patient had lapband removed October 31st. Dr. Palomares. Messaged his office, they advised GI consult as she still should not be having pain. Episode 2 weeks ago of vomiting. Having acid reflux even with water. Taking pepcid.       Abdominal Pain   This is a chronic problem. The current episode started more than 1 month ago. The onset quality is sudden. The problem occurs daily. The problem has been unchanged. The pain is located in the LUQ. The pain is at a severity of 5/10. The pain is moderate. The quality of the pain is aching, burning and sharp. Nothing aggravates the pain. She has tried acetaminophen for the symptoms. The treatment provided significant relief. Prior diagnostic workup includes surgery. Her past medical history is significant for abdominal surgery, gallstones and GERD. There is no history of colon cancer, Crohn's disease, irritable bowel syndrome, pancreatitis, PUD or ulcerative colitis. Patient's medical history includes UTI. Patient's medical history does not include kidney stones.   Gastroesophageal Reflux   She complains of abdominal pain.     Review of Systems   Gastrointestinal: Positive for abdominal pain.       Objective:      Physical Exam   Constitutional: She is oriented to person, place, and time. She appears well-developed and well-nourished. No distress.   HENT:   Head: Normocephalic and atraumatic.   Eyes: Conjunctivae and EOM are normal. Pupils are equal, round, and reactive to light. No scleral icterus.   Cardiovascular: Normal rate and regular rhythm.  Exam reveals no gallop and no friction rub.    No murmur heard.  Pulmonary/Chest: Effort normal and breath sounds normal.   Abdominal: Soft. Bowel sounds are normal. She exhibits no distension and no mass. There is no hepatosplenomegaly. There is tenderness in the left  upper quadrant. There is no rigidity, no rebound and no guarding.   Neurological: She is alert and oriented to person, place, and time. No cranial nerve deficit. Gait normal.   Psychiatric: She has a normal mood and affect.   Vitals reviewed.      Assessment:       1. Abdominal pain, left upper quadrant    2. Gastroesophageal reflux disease, esophagitis presence not specified        Plan:     Problem List Items Addressed This Visit     None      Visit Diagnoses     Abdominal pain, left upper quadrant    -  Primary    Relevant Orders    Ambulatory referral to Gastroenterology    Gastroesophageal reflux disease, esophagitis presence not specified        Relevant Orders    Ambulatory referral to Gastroenterology

## 2018-02-21 ENCOUNTER — OFFICE VISIT (OUTPATIENT)
Dept: GASTROENTEROLOGY | Facility: CLINIC | Age: 48
End: 2018-02-21
Payer: COMMERCIAL

## 2018-02-21 VITALS
DIASTOLIC BLOOD PRESSURE: 76 MMHG | BODY MASS INDEX: 45.99 KG/M2 | HEIGHT: 67 IN | HEART RATE: 82 BPM | WEIGHT: 293 LBS | SYSTOLIC BLOOD PRESSURE: 118 MMHG

## 2018-02-21 DIAGNOSIS — R10.12 LUQ PAIN: Primary | ICD-10-CM

## 2018-02-21 DIAGNOSIS — K21.9 GASTROESOPHAGEAL REFLUX DISEASE, ESOPHAGITIS PRESENCE NOT SPECIFIED: ICD-10-CM

## 2018-02-21 PROCEDURE — 3008F BODY MASS INDEX DOCD: CPT | Mod: S$GLB,,, | Performed by: NURSE PRACTITIONER

## 2018-02-21 PROCEDURE — 99244 OFF/OP CNSLTJ NEW/EST MOD 40: CPT | Mod: S$GLB,,, | Performed by: NURSE PRACTITIONER

## 2018-02-21 PROCEDURE — 99999 PR PBB SHADOW E&M-EST. PATIENT-LVL III: CPT | Mod: PBBFAC,,, | Performed by: NURSE PRACTITIONER

## 2018-02-21 RX ORDER — PANTOPRAZOLE SODIUM 40 MG/1
40 TABLET, DELAYED RELEASE ORAL DAILY
Qty: 30 TABLET | Refills: 5 | Status: ON HOLD | OUTPATIENT
Start: 2018-02-21 | End: 2018-02-26

## 2018-02-21 NOTE — LETTER
February 22, 2018      Deloris Aiken MD  [address] [place] [address] Dr Darnell GARCIAS [id number]           [facility]  [address] [place] [address] Dianne GARCIAS [address]  Phone: [phone]  Fax: [phone]          Patient: Mary Russo   MR Number: [id number]   YOB: 1970   Date of Visit: 2/21/2018       Dear Dr. Deloris Aiken:    Thank you for referring Mary Russo to me for evaluation. Attached you will find relevant portions of my assessment and plan of care.    If you have questions, please do not hesitate to call me. I look forward to following Mary Russo along with you.    Sincerely,    Betty Mccollum, STEPHANIE    Enclosure  CC:  No Recipients    If you would like to receive this communication electronically, please contact [email or web address] or [phone] to request more information on [employer] access.    For providers and/or their staff who would like to refer a patient to [facility], please contact us through our one-stop-shop provider referral line, [facility], at [phone].    If you feel you have received this communication in error or would no longer like to receive these types of communications, please e-mail [email or web address]

## 2018-02-22 RX ORDER — CIPROFLOXACIN 500 MG/1
500 TABLET ORAL 2 TIMES DAILY
Qty: 6 TABLET | Refills: 0 | Status: SHIPPED | OUTPATIENT
Start: 2018-02-22 | End: 2018-02-26

## 2018-02-22 NOTE — PROGRESS NOTES
Clinic Consult:  [facility] Consultation Note    Reason for Consult:  The primary encounter diagnosis was LUQ pain. A diagnosis of Gastroesophageal reflux disease, esophagitis presence not specified was also pertinent to this visit.    PCP: Deloris Aiken   [address] [place] [address]  / SHAISTA GARCIAS [id number]    HPI:  This is a 47 y.o. female here for evaluation of the above. She was referred to me by Dr. Aiken. She presents to clinic today with complaints of abdominal pan. Abdominal pain is located in the LUQ and is constant. Onset of pain started after getting the lap band 3 years ago. She developed dysphagia and had an EGD that showed a gaping lower esophageal sphincter and extrinsic compression in the cardia. This was from the lap band. Recommendation to have lap bad decompressed. This was back in 2014. She had the lap band removed in October 2017 and has continued with epigastric pain and describes worsening reflux. Symptoms worsen with intake, even water. She has been using NSAIDs for abdominal pain. Pain is also worsened with sitting or leaning ot the side. Also touching area increases pain.     Review of Systems   Constitutional: Negative for fever, malaise/fatigue and weight loss.   HENT: Negative for sore throat.    Respiratory: Negative for cough and wheezing.    Cardiovascular: Negative for chest pain and palpitations.   Gastrointestinal: Positive for abdominal pain (LUQ) and heartburn. Negative for blood in stool, constipation, diarrhea, melena, nausea and vomiting.   Genitourinary: Negative for dysuria and frequency.   Musculoskeletal: Negative for back pain, joint pain, myalgias and neck pain.   Skin: Negative for itching and rash.   Neurological: Negative for dizziness, speech change, seizures, loss of consciousness and headaches.   Psychiatric/Behavioral: Negative for depression and substance abuse. The patient is not nervous/anxious.        Medical History:  has a past medical history  "of Acid reflux; Anxiety; Carpal tunnel syndrome of right wrist; Cholelithiases; Morbid obesity; and Symptomatic cholelithiasis (7/26/2013).    Surgical History:  has a past surgical history that includes Tubal ligation; Laparoscopic gastric banding; Hernia repair; and Cholecystectomy.    Family History: family history includes Breast cancer in her mother; Cancer (age of onset: 49) in her mother; Diabetes in her mother; Heart disease in her father and mother; Obesity in her father, maternal grandmother, mother, sister, and sister; Stroke in her mother..     Social History:  reports that she has never smoked. She has never used smokeless tobacco. She reports that she drinks alcohol. She reports that she does not use drugs.    Allergies: Reviewed    Home Medications:   Current Outpatient Prescriptions on File Prior to Visit   Medication Sig Dispense Refill    buPROPion (WELLBUTRIN) 75 MG tablet Take 1 tablet (75 mg total) by mouth 2 (two) times daily. 180 tablet 1    fluticasone (FLONASE) 50 mcg/actuation nasal spray 2 sprays by Each Nare route once daily. Needs to be seen prior to additional refills 1 Bottle 0    ibuprofen (ADVIL,MOTRIN) 800 MG tablet Take 1 tablet (800 mg total) by mouth 3 (three) times daily as needed for Pain. 90 tablet 0    meloxicam (MOBIC) 15 MG tablet Take 1 tablet (15 mg total) by mouth once daily. 30 tablet 0    metoprolol succinate (TOPROL-XL) 50 MG 24 hr tablet Take 1 tablet (50 mg total) by mouth nightly. 90 tablet 1    tizanidine (ZANAFLEX) 4 MG tablet Take 1 tablet (4 mg total) by mouth 3 (three) times daily as needed. 60 tablet 0     No current facility-administered medications on file prior to visit.        Physical Exam:  /76   Pulse 82   Ht 5' 7.2" (1.707 m)   Wt (!) 144.2 kg (317 lb 14.5 oz)   BMI 49.49 kg/m²   Body mass index is 49.49 kg/m².  Physical Exam   Constitutional: She is oriented to person, place, and time and well-developed, well-nourished, and in no " distress. No distress.   HENT:   Head: Normocephalic.   Eyes: Conjunctivae are normal. Pupils are equal, round, and reactive to light.   Cardiovascular: Normal rate, regular rhythm and normal heart sounds.    Pulmonary/Chest: Effort normal and breath sounds normal. No respiratory distress.   Abdominal: Soft. Bowel sounds are normal. She exhibits no distension. There is no tenderness.   Neurological: She is alert and oriented to person, place, and time. No cranial nerve deficit.   Skin: Skin is warm and dry. No rash noted.   Psychiatric: Mood and affect normal.       Labs: Pertinent labs reviewed.  Endoscopy:  See HPI  CRC Screening: none prior    Assessment:  1. LUQ pain    2. Gastroesophageal reflux disease, esophagitis presence not specified         Recommendations:  LUQ pain  Gastroesophageal reflux disease, esophagitis presence not specified  - recommend repeat EGD to R/O PUD vs gastitis  - consider CT scan if EGD unrevealing  -     Case request GI: ESOPHAGOGASTRODUODENOSCOPY (EGD)  -     pantoprazole (PROTONIX) 40 MG tablet; Take 1 tablet (40 mg total) by mouth once daily.  Dispense: 30 tablet; Refill: 5    Follow-up in about 6 weeks (around 4/4/2018).    Thank you so much for allowing me to participate in the care of [facility]  ELAINA Martins

## 2018-02-23 ENCOUNTER — PATIENT MESSAGE (OUTPATIENT)
Dept: INTERNAL MEDICINE | Facility: CLINIC | Age: 48
End: 2018-02-23

## 2018-02-26 ENCOUNTER — ANESTHESIA (OUTPATIENT)
Dept: ENDOSCOPY | Facility: HOSPITAL | Age: 48
End: 2018-02-26
Payer: COMMERCIAL

## 2018-02-26 ENCOUNTER — HOSPITAL ENCOUNTER (OUTPATIENT)
Facility: HOSPITAL | Age: 48
Discharge: HOME | End: 2018-02-26
Attending: INTERNAL MEDICINE | Admitting: INTERNAL MEDICINE
Payer: COMMERCIAL

## 2018-02-26 ENCOUNTER — ANESTHESIA EVENT (OUTPATIENT)
Dept: ENDOSCOPY | Facility: HOSPITAL | Age: 48
End: 2018-02-26
Payer: COMMERCIAL

## 2018-02-26 ENCOUNTER — SURGERY (OUTPATIENT)
Age: 48
End: 2018-02-26

## 2018-02-26 VITALS
DIASTOLIC BLOOD PRESSURE: 90 MMHG | TEMPERATURE: 99 F | OXYGEN SATURATION: 100 % | RESPIRATION RATE: 18 BRPM | HEART RATE: 67 BPM | WEIGHT: 293 LBS | BODY MASS INDEX: 47.09 KG/M2 | HEIGHT: 66 IN | SYSTOLIC BLOOD PRESSURE: 132 MMHG

## 2018-02-26 DIAGNOSIS — R10.12 LUQ PAIN: ICD-10-CM

## 2018-02-26 LAB
B-HCG UR QL: NEGATIVE
CTP QC/QA: YES

## 2018-02-26 PROCEDURE — 37000008 HC ANESTHESIA 1ST 15 MINUTES: Performed by: INTERNAL MEDICINE

## 2018-02-26 PROCEDURE — 25000003 PHARM REV CODE 250: Performed by: INTERNAL MEDICINE

## 2018-02-26 PROCEDURE — 43235 EGD DIAGNOSTIC BRUSH WASH: CPT | Mod: ,,, | Performed by: INTERNAL MEDICINE

## 2018-02-26 PROCEDURE — 81025 URINE PREGNANCY TEST: CPT | Performed by: INTERNAL MEDICINE

## 2018-02-26 PROCEDURE — 25000003 PHARM REV CODE 250: Performed by: NURSE ANESTHETIST, CERTIFIED REGISTERED

## 2018-02-26 PROCEDURE — 43235 EGD DIAGNOSTIC BRUSH WASH: CPT | Performed by: INTERNAL MEDICINE

## 2018-02-26 PROCEDURE — 37000009 HC ANESTHESIA EA ADD 15 MINS: Performed by: INTERNAL MEDICINE

## 2018-02-26 RX ORDER — SODIUM CHLORIDE, SODIUM LACTATE, POTASSIUM CHLORIDE, CALCIUM CHLORIDE 600; 310; 30; 20 MG/100ML; MG/100ML; MG/100ML; MG/100ML
INJECTION, SOLUTION INTRAVENOUS CONTINUOUS
Status: DISCONTINUED | OUTPATIENT
Start: 2018-02-26 | End: 2018-02-26 | Stop reason: HOSPADM

## 2018-02-26 RX ORDER — LIDOCAINE HYDROCHLORIDE 10 MG/ML
INJECTION INFILTRATION; PERINEURAL
Status: DISCONTINUED | OUTPATIENT
Start: 2018-02-26 | End: 2018-02-26

## 2018-02-26 RX ORDER — PANTOPRAZOLE SODIUM 40 MG/1
40 TABLET, DELAYED RELEASE ORAL 2 TIMES DAILY
Qty: 60 TABLET | Refills: 11 | Status: SHIPPED | OUTPATIENT
Start: 2018-02-26 | End: 2018-10-01

## 2018-02-26 RX ADMIN — LIDOCAINE HYDROCHLORIDE 50 MG: 10 INJECTION, SOLUTION INFILTRATION; PERINEURAL at 10:02

## 2018-02-26 RX ADMIN — SODIUM CHLORIDE, SODIUM LACTATE, POTASSIUM CHLORIDE, AND CALCIUM CHLORIDE: 600; 310; 30; 20 INJECTION, SOLUTION INTRAVENOUS at 10:02

## 2018-02-26 NOTE — ANESTHESIA PREPROCEDURE EVALUATION
02/26/2018  Mary Russo is a 47 y.o., female.    Anesthesia Evaluation    I have reviewed the Patient Summary Reports.    I have reviewed the Nursing Notes.   I have reviewed the Medications.     Review of Systems  Anesthesia Hx:  No problems with previous Anesthesia    Cardiovascular:   Hypertension, well controlled    Hepatic/GI:   GERD, well controlled    Neurological:   Neuromuscular Disease,    Psych:   Psychiatric History          Physical Exam  General:  Obesity    Airway/Jaw/Neck:  Airway Findings: Mouth Opening: Normal Tongue: Normal  General Airway Assessment: Adult  Mallampati: II  TM Distance: Normal, at least 6 cm      Dental:  Dental Findings: In tact   Chest/Lungs:  Chest/Lungs Findings: Normal Respiratory Rate     Heart/Vascular:  Heart Findings: Rate: Normal  Rhythm: Regular Rhythm             Anesthesia Plan  Type of Anesthesia, risks & benefits discussed:  Anesthesia Type:  MAC  Patient's Preference:   Intra-op Monitoring Plan:   Intra-op Monitoring Plan Comments:   Post Op Pain Control Plan:   Post Op Pain Control Plan Comments:   Induction:   IV  Beta Blocker:  Patient is on a Beta-Blocker and has received one dose within the past 24 hours (No further documentation required).       Informed Consent: Patient understands risks and agrees with Anesthesia plan.  Questions answered. Anesthesia consent signed with patient.  ASA Score: 2     Day of Surgery Review of History & Physical: I have interviewed and examined the patient. I have reviewed the patient's H&P dated:  There are no significant changes.          Ready For Surgery From Anesthesia Perspective.

## 2018-02-26 NOTE — ANESTHESIA RELEASE NOTE
"Anesthesia Release from PACU Note    [facility]    Procedure(s) Performed: Procedure(s) (LRB):  ESOPHAGOGASTRODUODENOSCOPY (EGD) (N/A)    Anesthesia type: MAC    Post pain: Adequate analgesia    Post assessment: no apparent anesthetic complications, tolerated procedure well and no evidence of recall    Last Vitals:   Visit Vitals  BP (!) 128/96 (Patient Position: Lying)   Pulse 86   Temp 37 °C (98.6 °F) (Oral)   Resp 18   Ht 5' 6" (1.676 m)   Wt (!) 142.9 kg (315 lb)   LMP 02/13/2018   SpO2 99%   Breastfeeding? No   BMI 50.84 kg/m²       Post vital signs: stable    Level of consciousness: awake and alert     Nausea/Vomiting: no nausea/no vomiting    Complications: none    Airway Patency: patent    Respiratory: unassisted, spontaneous ventilation    Cardiovascular: stable and blood pressure at baseline    Hydration: euvolemic  "

## 2018-02-26 NOTE — DISCHARGE INSTRUCTIONS
Esophagitis     With esophagitis, the lining of the esophagus is inflamed.   Do you often have burning pain in your chest? You may have esophagitis. This is when the lining of the esophagus becomes red and swollen (inflamed). The esophagus is the tube that connects your throat to your stomach. This sheet tells you more about esophagitis. It also explains your treatment options.  Main types of esophagitis  Reflux esophagitis. This is the more common type. It is caused by GERD (gastroesophageal reflux disease). Stomach contents with stomach acid flow back up into the esophagus. This happens over and over. It leads to inflammation. Risk factors can include:  · Being overweight  · Asthma  · Smoking  · Pregnancy  · Frequent vomiting  · Certain medicines (such as aspirin and other anti-inflammatories)  · Hiatal hernia  Infectious esophagitis. This is caused by an infection. You are more at risk for this if you have a weakened immune system and poor nutrition. Antibiotic use can also be a factor. The infection is often due to the following:  · A type of fungus (typically candida)  · A virus, such as herpes simplex virus 1 (HSV-1) or cytomegalovirus (CMV)  Eosinophilic esophagitis. Foods or other things around you can give you an allergic reaction. This triggers an immune response and leads to esophagitis.  Pill-induced esophagitis. Certain types of medicines can cause inflammation and ulcers in the esophagus. These include doxycycline, aspirin, NSAIDs, alendronate, potassium, quinidine, iron.  Symptoms of esophagitis  The following symptoms can occur with esophagitis:  · Pain when swallowing, or trouble swallowing  · Pain behind your breastbone (heartburn)  · Acid regurgitation  · Chronic sore throat  · Gum Inflammation  · Cavities  · Bad breath  · Nausea  · Pain in your upper belly (abdomen)  · Bleeding (indicated by bright red vomit or black, tarry stool)  These symptoms occur more often with reflux  esophagitis:  · Coughing, wheezing, or asthma  · Hoarseness  Diagnosis of esophagitis  Your healthcare provider will ask about your health history and symptoms. Youll also be examined. Sometimes certain tests are needed. These may include:  · Upper endoscopy. A thin, flexible tube with a tiny light and camera is used. It is inserted through the mouth down into the esophagus. This lets the provider look for damage. A small sample of tissue (biopsy) may also be removed. The sample is sent to a lab for testing.  · Upper GI X-ray with barium. An X-ray is done after you drink a substance called barium. Barium may make problems in the esophagus easier to see on an x-ray.  · Esophageal pH. A soft, thin tube is passed into the esophagus through the nose or mouth for 24 hours. It measures the acid level in the esophagus.  · Esophageal manometry. A soft, thin tube is passed into the esophagus through the nose or mouth. It measures muscle contractions in the esophagus.  Treatment of esophagitis  Medicines. Different medicines can help treat esophagitis. The medicine used will depend on the type of esophagitis you have. Talk with your healthcare provider.  Lifestyle changes. Making the following changes can help reduce irritation and ease your symptoms:  · Avoid spicy foods (pepper, chili powder, smith). Also avoid hard foods (nuts, crackers, raw vegetables) and acidic foods and drinks (tomatoes, citrus fruits and juices). Other problem foods include chocolate, peppermint, nutmeg, and foods high in fat.  · Until you can swallow without pain, follow a combined liquid and soft diet. Try foods such as cooked cereals, mashed potatoes, and soups.  · Take small bites and chew your food thoroughly.  · Avoid large meals and heavy evening meals. Don't lie down within 2 to 3 hours of eating.  · Get to or stay at a healthy weight.  · Avoid alcohol, caffeine, and smoking or tobacco products.  · Brush and floss your teeth  · Raise your  upper body by 4 to 6 inches when lying in bed. This can be done using a foam wedge. Or put blocks under the legs at the head of your bed.  Surgery. This may be needed for severe reflux esophagitis. Other noninvasive procedures to treat GERD and esophagitis are being studied. Your provider can tell you more.  Why treatment Is important  Without treatment, esophagitis can get worse. This is especially true with severe reflux esophagitis. For instance, continued symptoms can cause scarring of the esophagus. Over time, this can cause a narrowing the esophagus (stricture). This can make it hard to pass food down to the stomach. As symptoms go on they can also cause changes in the lining of the esophagus. These changes can put you at a slightly higher risk of cancer of the esophagus.   Date Last Reviewed: 7/1/2016  © 9308-4385 The "[employer]. [address] [place] [address], [place] [address]. All rights reserved. This information is not intended as a substitute for professional medical care. Always follow your healthcare professional's instructions.

## 2018-02-26 NOTE — INTERVAL H&P NOTE
The patient has been examined and the H&P has been reviewed:    I concur with the findings and no changes have occurred since H&P was written.    Anesthesia/Surgery risks, benefits and alternative options discussed and understood by patient/family.          Active Hospital Problems    Diagnosis  POA    LUQ pain [R10.12]  Yes      Resolved Hospital Problems    Diagnosis Date Resolved POA   No resolved problems to display.

## 2018-02-26 NOTE — ANESTHESIA POSTPROCEDURE EVALUATION
"Anesthesia Post Evaluation    Patient: [facility]    Procedure(s) Performed: Procedure(s) (LRB):  ESOPHAGOGASTRODUODENOSCOPY (EGD) (N/A)    Final Anesthesia Type: MAC  Patient location during evaluation: GI PACU  Patient participation: Yes- Able to Participate  Level of consciousness: awake and alert  Post-procedure vital signs: reviewed and stable  Pain management: adequate  Airway patency: patent  PONV status at discharge: No PONV  Anesthetic complications: no      Cardiovascular status: blood pressure returned to baseline  Respiratory status: unassisted and spontaneous ventilation  Hydration status: euvolemic  Follow-up not needed.        Visit Vitals  BP (!) 128/96 (Patient Position: Lying)   Pulse 86   Temp 37 °C (98.6 °F) (Oral)   Resp 18   Ht 5' 6" (1.676 m)   Wt (!) 142.9 kg (315 lb)   LMP 02/13/2018   SpO2 99%   Breastfeeding? No   BMI 50.84 kg/m²       Pain/Barrett Score: Pain Assessment Performed: Yes (2/26/2018  9:55 AM)  Presence of Pain: denies (2/26/2018  9:55 AM)  Barrett Score: 9 (2/26/2018 10:41 AM)      "

## 2018-02-26 NOTE — TRANSFER OF CARE
"Anesthesia Transfer of Care Note    Patient: [facility]    Procedure(s) Performed: Procedure(s) (LRB):  ESOPHAGOGASTRODUODENOSCOPY (EGD) (N/A)    Patient location: GI    Anesthesia Type: MAC    Transport from OR: Transported from OR on room air with adequate spontaneous ventilation    Post pain: adequate analgesia    Post assessment: no apparent anesthetic complications    Post vital signs: stable    Level of consciousness: awake, alert and oriented    Nausea/Vomiting: no nausea/vomiting    Complications: none    Transfer of care protocol was followed      Last vitals:   Visit Vitals  BP (!) 128/96 (Patient Position: Lying)   Pulse 86   Temp 37 °C (98.6 °F) (Oral)   Resp 18   Ht 5' 6" (1.676 m)   Wt (!) 142.9 kg (315 lb)   LMP 02/13/2018   SpO2 99%   Breastfeeding? No   BMI 50.84 kg/m²     "

## 2018-02-26 NOTE — H&P (VIEW-ONLY)
Clinic Consult:  [facility] Consultation Note    Reason for Consult:  The primary encounter diagnosis was LUQ pain. A diagnosis of Gastroesophageal reflux disease, esophagitis presence not specified was also pertinent to this visit.    PCP: Deloris Aiken   [address] [place] [address]  / SHAISTA GARCIAS [id number]    HPI:  This is a 47 y.o. female here for evaluation of the above. She was referred to me by Dr. Aiken. She presents to clinic today with complaints of abdominal pan. Abdominal pain is located in the LUQ and is constant. Onset of pain started after getting the lap band 3 years ago. She developed dysphagia and had an EGD that showed a gaping lower esophageal sphincter and extrinsic compression in the cardia. This was from the lap band. Recommendation to have lap bad decompressed. This was back in 2014. She had the lap band removed in October 2017 and has continued with epigastric pain and describes worsening reflux. Symptoms worsen with intake, even water. She has been using NSAIDs for abdominal pain. Pain is also worsened with sitting or leaning ot the side. Also touching area increases pain.     Review of Systems   Constitutional: Negative for fever, malaise/fatigue and weight loss.   HENT: Negative for sore throat.    Respiratory: Negative for cough and wheezing.    Cardiovascular: Negative for chest pain and palpitations.   Gastrointestinal: Positive for abdominal pain (LUQ) and heartburn. Negative for blood in stool, constipation, diarrhea, melena, nausea and vomiting.   Genitourinary: Negative for dysuria and frequency.   Musculoskeletal: Negative for back pain, joint pain, myalgias and neck pain.   Skin: Negative for itching and rash.   Neurological: Negative for dizziness, speech change, seizures, loss of consciousness and headaches.   Psychiatric/Behavioral: Negative for depression and substance abuse. The patient is not nervous/anxious.        Medical History:  has a past medical history  "of Acid reflux; Anxiety; Carpal tunnel syndrome of right wrist; Cholelithiases; Morbid obesity; and Symptomatic cholelithiasis (7/26/2013).    Surgical History:  has a past surgical history that includes Tubal ligation; Laparoscopic gastric banding; Hernia repair; and Cholecystectomy.    Family History: family history includes Breast cancer in her mother; Cancer (age of onset: 49) in her mother; Diabetes in her mother; Heart disease in her father and mother; Obesity in her father, maternal grandmother, mother, sister, and sister; Stroke in her mother..     Social History:  reports that she has never smoked. She has never used smokeless tobacco. She reports that she drinks alcohol. She reports that she does not use drugs.    Allergies: Reviewed    Home Medications:   Current Outpatient Prescriptions on File Prior to Visit   Medication Sig Dispense Refill    buPROPion (WELLBUTRIN) 75 MG tablet Take 1 tablet (75 mg total) by mouth 2 (two) times daily. 180 tablet 1    fluticasone (FLONASE) 50 mcg/actuation nasal spray 2 sprays by Each Nare route once daily. Needs to be seen prior to additional refills 1 Bottle 0    ibuprofen (ADVIL,MOTRIN) 800 MG tablet Take 1 tablet (800 mg total) by mouth 3 (three) times daily as needed for Pain. 90 tablet 0    meloxicam (MOBIC) 15 MG tablet Take 1 tablet (15 mg total) by mouth once daily. 30 tablet 0    metoprolol succinate (TOPROL-XL) 50 MG 24 hr tablet Take 1 tablet (50 mg total) by mouth nightly. 90 tablet 1    tizanidine (ZANAFLEX) 4 MG tablet Take 1 tablet (4 mg total) by mouth 3 (three) times daily as needed. 60 tablet 0     No current facility-administered medications on file prior to visit.        Physical Exam:  /76   Pulse 82   Ht 5' 7.2" (1.707 m)   Wt (!) 144.2 kg (317 lb 14.5 oz)   BMI 49.49 kg/m²   Body mass index is 49.49 kg/m².  Physical Exam   Constitutional: She is oriented to person, place, and time and well-developed, well-nourished, and in no " distress. No distress.   HENT:   Head: Normocephalic.   Eyes: Conjunctivae are normal. Pupils are equal, round, and reactive to light.   Cardiovascular: Normal rate, regular rhythm and normal heart sounds.    Pulmonary/Chest: Effort normal and breath sounds normal. No respiratory distress.   Abdominal: Soft. Bowel sounds are normal. She exhibits no distension. There is no tenderness.   Neurological: She is alert and oriented to person, place, and time. No cranial nerve deficit.   Skin: Skin is warm and dry. No rash noted.   Psychiatric: Mood and affect normal.       Labs: Pertinent labs reviewed.  Endoscopy:  See HPI  CRC Screening: none prior    Assessment:  1. LUQ pain    2. Gastroesophageal reflux disease, esophagitis presence not specified         Recommendations:  LUQ pain  Gastroesophageal reflux disease, esophagitis presence not specified  - recommend repeat EGD to R/O PUD vs gastitis  - consider CT scan if EGD unrevealing  -     Case request GI: ESOPHAGOGASTRODUODENOSCOPY (EGD)  -     pantoprazole (PROTONIX) 40 MG tablet; Take 1 tablet (40 mg total) by mouth once daily.  Dispense: 30 tablet; Refill: 5    Follow-up in about 6 weeks (around 4/4/2018).    Thank you so much for allowing me to participate in the care of [facility]  ELAINA Martins

## 2018-03-13 ENCOUNTER — OFFICE VISIT (OUTPATIENT)
Dept: FAMILY MEDICINE | Facility: CLINIC | Age: 48
End: 2018-03-13
Payer: COMMERCIAL

## 2018-03-13 VITALS
DIASTOLIC BLOOD PRESSURE: 87 MMHG | HEART RATE: 114 BPM | RESPIRATION RATE: 17 BRPM | OXYGEN SATURATION: 100 % | HEIGHT: 66 IN | TEMPERATURE: 99 F | BODY MASS INDEX: 47.09 KG/M2 | WEIGHT: 293 LBS | SYSTOLIC BLOOD PRESSURE: 139 MMHG

## 2018-03-13 DIAGNOSIS — S16.1XXA STRAIN OF NECK MUSCLE, INITIAL ENCOUNTER: ICD-10-CM
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
## 2018-03-13 DIAGNOSIS — S46.919A STRAIN OF SHOULDER, UNSPECIFIED LATERALITY, INITIAL ENCOUNTER: ICD-10-CM

## 2018-03-13 DIAGNOSIS — S39.012A STRAIN OF LUMBAR REGION, INITIAL ENCOUNTER: ICD-10-CM

## 2018-03-13 PROBLEM — R10.12 LUQ PAIN: Status: RESOLVED | Noted: 2018-02-26 | Resolved: 2018-03-13

## 2018-03-13 PROCEDURE — 3079F DIAST BP 80-89 MM HG: CPT | Mod: CPTII,S$GLB,, | Performed by: REGISTERED NURSE

## 2018-03-13 PROCEDURE — 99214 OFFICE O/P EST MOD 30 MIN: CPT | Mod: S$GLB,,, | Performed by: REGISTERED NURSE

## 2018-03-13 PROCEDURE — 99999 PR PBB SHADOW E&M-EST. PATIENT-LVL IV: CPT | Mod: PBBFAC,,, | Performed by: REGISTERED NURSE

## 2018-03-13 PROCEDURE — 3075F SYST BP GE 130 - 139MM HG: CPT | Mod: CPTII,S$GLB,, | Performed by: REGISTERED NURSE

## 2018-03-13 RX ORDER — IBUPROFEN 800 MG/1
800 TABLET ORAL 3 TIMES DAILY PRN
Qty: 90 TABLET | Refills: 0 | Status: SHIPPED | OUTPATIENT
Start: 2018-03-13 | End: 2018-04-24 | Stop reason: SDUPTHER

## 2018-03-13 RX ORDER — CYCLOBENZAPRINE HCL 10 MG
5-10 TABLET ORAL 3 TIMES DAILY PRN
Qty: 60 TABLET | Refills: 0 | Status: SHIPPED | OUTPATIENT
Start: 2018-03-13 | End: 2018-03-30 | Stop reason: SDUPTHER

## 2018-03-13 NOTE — LETTER
March 13, 2018      [facility]  [address] [address]  Phone: [phone]       Patient: Mary Russo   YOB: 1970  Date of Visit: 03/13/2018    To Whom It May Concern:    Jolanta Russo  was at [facility] on 03/13/2018. She may return to work on 03/14/2018 with no restrictions. If you have any questions or concerns, or if I can be of further assistance, please do not hesitate to contact me.    Sincerely,    Didier Weaver NP
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
## 2018-03-30 DIAGNOSIS — S16.1XXA STRAIN OF NECK MUSCLE, INITIAL ENCOUNTER: ICD-10-CM

## 2018-03-30 DIAGNOSIS — S46.919A STRAIN OF SHOULDER, UNSPECIFIED LATERALITY, INITIAL ENCOUNTER: ICD-10-CM

## 2018-03-30 DIAGNOSIS — S39.012A STRAIN OF LUMBAR REGION, INITIAL ENCOUNTER: ICD-10-CM

## 2018-04-02 RX ORDER — CYCLOBENZAPRINE HCL 10 MG
TABLET ORAL
Qty: 60 TABLET | Refills: 0 | Status: SHIPPED | OUTPATIENT
Start: 2018-04-02 | End: 2018-10-01

## 2018-04-06 ENCOUNTER — PATIENT MESSAGE (OUTPATIENT)
Dept: FAMILY MEDICINE | Facility: CLINIC | Age: 48
End: 2018-04-06

## 2018-04-10 ENCOUNTER — PATIENT MESSAGE (OUTPATIENT)
Dept: INTERNAL MEDICINE | Facility: CLINIC | Age: 48
End: 2018-04-10

## 2018-04-10 DIAGNOSIS — M54.2 NECK PAIN: Primary | ICD-10-CM

## 2018-04-10 DIAGNOSIS — M25.519 SHOULDER PAIN, UNSPECIFIED CHRONICITY, UNSPECIFIED LATERALITY: ICD-10-CM

## 2018-04-24 ENCOUNTER — CLINICAL SUPPORT (OUTPATIENT)
Dept: REHABILITATION | Facility: HOSPITAL | Age: 48
End: 2018-04-24
Payer: COMMERCIAL
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
## 2018-04-24 DIAGNOSIS — S46.919A STRAIN OF SHOULDER, UNSPECIFIED LATERALITY, INITIAL ENCOUNTER: ICD-10-CM

## 2018-04-24 DIAGNOSIS — M25.519 SHOULDER PAIN, UNSPECIFIED CHRONICITY, UNSPECIFIED LATERALITY: ICD-10-CM

## 2018-04-24 DIAGNOSIS — S39.012A STRAIN OF LUMBAR REGION, INITIAL ENCOUNTER: ICD-10-CM

## 2018-04-24 DIAGNOSIS — M54.2 NECK PAIN: Primary | ICD-10-CM

## 2018-04-24 DIAGNOSIS — S16.1XXA STRAIN OF NECK MUSCLE, INITIAL ENCOUNTER: ICD-10-CM

## 2018-04-24 PROCEDURE — 97140 MANUAL THERAPY 1/> REGIONS: CPT

## 2018-04-24 PROCEDURE — 97161 PT EVAL LOW COMPLEX 20 MIN: CPT

## 2018-04-24 PROCEDURE — 97014 ELECTRIC STIMULATION THERAPY: CPT

## 2018-04-24 RX ORDER — IBUPROFEN 800 MG/1
TABLET ORAL
Qty: 90 TABLET | Refills: 0 | Status: SHIPPED | OUTPATIENT
Start: 2018-04-24 | End: 2018-10-01
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
## 2018-04-26 ENCOUNTER — CLINICAL SUPPORT (OUTPATIENT)
Dept: REHABILITATION | Facility: HOSPITAL | Age: 48
End: 2018-04-26
Payer: COMMERCIAL

## 2018-04-26 DIAGNOSIS — M25.519 SHOULDER PAIN, UNSPECIFIED CHRONICITY, UNSPECIFIED LATERALITY: ICD-10-CM

## 2018-04-26 DIAGNOSIS — M54.2 NECK PAIN: Primary | ICD-10-CM

## 2018-04-26 PROCEDURE — 97014 ELECTRIC STIMULATION THERAPY: CPT

## 2018-04-26 PROCEDURE — 97140 MANUAL THERAPY 1/> REGIONS: CPT

## 2018-04-26 PROCEDURE — 97110 THERAPEUTIC EXERCISES: CPT
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
## 2018-05-11 DIAGNOSIS — I10 ESSENTIAL HYPERTENSION: ICD-10-CM

## 2018-05-11 DIAGNOSIS — F41.9 ANXIETY AND DEPRESSION: ICD-10-CM

## 2018-05-11 DIAGNOSIS — F32.A ANXIETY AND DEPRESSION: ICD-10-CM

## 2018-05-11 RX ORDER — METOPROLOL SUCCINATE 50 MG/1
50 TABLET, EXTENDED RELEASE ORAL NIGHTLY
Qty: 90 TABLET | Refills: 1 | Status: SHIPPED | OUTPATIENT
Start: 2018-05-11 | End: 2018-10-01 | Stop reason: SDUPTHER

## 2018-05-11 RX ORDER — BUPROPION HYDROCHLORIDE 75 MG/1
75 TABLET ORAL 2 TIMES DAILY
Qty: 180 TABLET | Refills: 1 | Status: SHIPPED | OUTPATIENT
Start: 2018-05-11 | End: 2018-10-01 | Stop reason: SDUPTHER

## 2018-06-21 ENCOUNTER — OFFICE VISIT (OUTPATIENT)
Dept: FAMILY MEDICINE | Facility: CLINIC | Age: 48
End: 2018-06-21
Payer: COMMERCIAL

## 2018-06-21 VITALS
TEMPERATURE: 98 F | WEIGHT: 293 LBS | HEIGHT: 66 IN | BODY MASS INDEX: 47.09 KG/M2 | SYSTOLIC BLOOD PRESSURE: 147 MMHG | DIASTOLIC BLOOD PRESSURE: 84 MMHG | OXYGEN SATURATION: 98 % | HEART RATE: 76 BPM | RESPIRATION RATE: 17 BRPM

## 2018-06-21 DIAGNOSIS — N39.0 ACUTE UTI (URINARY TRACT INFECTION): Primary | ICD-10-CM

## 2018-06-21 DIAGNOSIS — R39.9 UTI SYMPTOMS: ICD-10-CM

## 2018-06-21 LAB
BILIRUB SERPL-MCNC: NEGATIVE MG/DL
BLOOD URINE, POC: 50
COLOR, POC UA: YELLOW
GLUCOSE UR QL STRIP: NORMAL
KETONES UR QL STRIP: NEGATIVE
LEUKOCYTE ESTERASE URINE, POC: ABNORMAL
NITRITE, POC UA: NEGATIVE
PH, POC UA: 6
PROTEIN, POC: ABNORMAL
SPECIFIC GRAVITY, POC UA: 1.02
UROBILINOGEN, POC UA: NORMAL

## 2018-06-21 PROCEDURE — 81002 URINALYSIS NONAUTO W/O SCOPE: CPT | Mod: S$GLB,,, | Performed by: REGISTERED NURSE

## 2018-06-21 PROCEDURE — 3077F SYST BP >= 140 MM HG: CPT | Mod: CPTII,S$GLB,, | Performed by: REGISTERED NURSE

## 2018-06-21 PROCEDURE — 99213 OFFICE O/P EST LOW 20 MIN: CPT | Mod: 25,S$GLB,, | Performed by: REGISTERED NURSE

## 2018-06-21 PROCEDURE — 3079F DIAST BP 80-89 MM HG: CPT | Mod: CPTII,S$GLB,, | Performed by: REGISTERED NURSE

## 2018-06-21 PROCEDURE — 99999 PR PBB SHADOW E&M-EST. PATIENT-LVL IV: CPT | Mod: PBBFAC,,, | Performed by: REGISTERED NURSE

## 2018-06-21 PROCEDURE — 3008F BODY MASS INDEX DOCD: CPT | Mod: CPTII,S$GLB,, | Performed by: REGISTERED NURSE

## 2018-06-21 PROCEDURE — 87086 URINE CULTURE/COLONY COUNT: CPT

## 2018-06-21 RX ORDER — CIPROFLOXACIN 250 MG/1
250 TABLET, FILM COATED ORAL 2 TIMES DAILY
Qty: 6 TABLET | Refills: 0 | Status: SHIPPED | OUTPATIENT
Start: 2018-06-21 | End: 2018-06-24

## 2018-06-21 NOTE — PROGRESS NOTES
"Subjective:       Patient ID: Mary Russo is a 48 y.o. female.    Chief Complaint: Urinary Tract Infection      HPI    Mary is here today with urinary complaints x 3 days.  Reports urgency, side pain, frequency and only able to void in small amounts.  Has increased water intake since onset of symptoms.      Review of Systems   Constitutional: Negative for chills and fever.   Respiratory: Negative.    Cardiovascular: Negative.    Genitourinary: Positive for flank pain, frequency and urgency. Negative for hematuria and pelvic pain.   Neurological: Negative.          Patient Active Problem List   Diagnosis    Arthritis of both knees    Essential hypertension    Morbid obesity with BMI of 50.0-59.9, adult    Anxiety and depression    Seasonal allergic rhinitis       Past medical, surgical, family and social histories have been reviewed today.        Objective:     Vitals:    06/21/18 0908   BP: (!) 147/84   BP Location: Left arm   Patient Position: Sitting   BP Method: Large (Automatic)   Pulse: 76   Resp: 17   Temp: 97.8 °F (36.6 °C)   TempSrc: Tympanic   SpO2: 98%   Weight: (!) 145.9 kg (321 lb 10.4 oz)   Height: 5' 6" (1.676 m)       Physical Exam   Constitutional: She is oriented to person, place, and time. She appears well-developed and well-nourished.   Cardiovascular: Normal rate and regular rhythm.    Pulmonary/Chest: Effort normal and breath sounds normal.   Abdominal: There is no tenderness. There is no CVA tenderness.   Neurological: She is alert and oriented to person, place, and time.   Vitals reviewed.        Medication List with Changes/Refills   New Medications    CIPROFLOXACIN HCL (CIPRO) 250 MG TABLET    Take 1 tablet (250 mg total) by mouth 2 (two) times daily. for 3 days   Current Medications    BUPROPION (WELLBUTRIN) 75 MG TABLET    TAKE 1 TABLET (75 MG TOTAL) BY MOUTH 2 (TWO) TIMES DAILY.    CYCLOBENZAPRINE (FLEXERIL) 10 MG TABLET    TAKE 1/2 -1 TABLET THREE TIMES DAILY AS NEEDED " FOR SPASMS.TAKE AT BEDTIME ONLY IF INCREASED DAYTIME    IBUPROFEN (ADVIL,MOTRIN) 800 MG TABLET    TAKE 1 TABLET 3 TIMES A DAY AS NEEDED FOR PAIN WITH FOOD    METOPROLOL SUCCINATE (TOPROL-XL) 50 MG 24 HR TABLET    TAKE 1 TABLET (50 MG TOTAL) BY MOUTH NIGHTLY.    PANTOPRAZOLE (PROTONIX) 40 MG TABLET    Take 1 tablet (40 mg total) by mouth 2 (two) times daily.   Discontinued Medications    FLUTICASONE (FLONASE) 50 MCG/ACTUATION NASAL SPRAY    2 sprays by Each Nare route once daily. Needs to be seen prior to additional refills         Component      Latest Ref Rng & Units 6/21/2018   Color, UA       yellow   Spec Grav UA       1.020   pH, UA       6   WBC, UA       trace   Nitrite, UA       negative   Protein       trace   Glucose, UA       normal   Ketones, UA       negative   Urobilinogen, UA       normal   Bilirubin       negative   RBC, UA       50         Diagnosis       1. Acute UTI (urinary tract infection)    2. UTI symptoms          Assessment/ Plan     Acute UTI (urinary tract infection)  · PT with urinary complaints x 3 days, positive for UTI.  · Infection triggers and prevention discussed.  · Urine culture pending.  · Orders:  -     Urine culture  -     ciprofloxacin HCl (CIPRO) 250 MG tablet; Take 1 tablet (250 mg total) by mouth 2 (two) times daily. for 3 days  Dispense: 6 tablet; Refill: 0    UTI symptoms  -     POCT urine dipstick without microscope      Follow-up in clinic as needed.        HALIE Tristan  [facility]

## 2018-06-22 LAB
BACTERIA UR CULT: NORMAL
BACTERIA UR CULT: NORMAL

## 2018-10-01 ENCOUNTER — PATIENT MESSAGE (OUTPATIENT)
Dept: FAMILY MEDICINE | Facility: CLINIC | Age: 48
End: 2018-10-01

## 2018-10-01 ENCOUNTER — TELEPHONE (OUTPATIENT)
Dept: FAMILY MEDICINE | Facility: CLINIC | Age: 48
End: 2018-10-01

## 2018-10-01 ENCOUNTER — OFFICE VISIT (OUTPATIENT)
Dept: FAMILY MEDICINE | Facility: CLINIC | Age: 48
End: 2018-10-01
Payer: COMMERCIAL

## 2018-10-01 VITALS
WEIGHT: 293 LBS | HEART RATE: 89 BPM | HEIGHT: 67 IN | SYSTOLIC BLOOD PRESSURE: 138 MMHG | TEMPERATURE: 98 F | BODY MASS INDEX: 45.99 KG/M2 | DIASTOLIC BLOOD PRESSURE: 80 MMHG

## 2018-10-01 DIAGNOSIS — F41.9 ANXIETY AND DEPRESSION: ICD-10-CM

## 2018-10-01 DIAGNOSIS — Z00.00 ANNUAL PHYSICAL EXAM: Primary | ICD-10-CM

## 2018-10-01 DIAGNOSIS — I10 ESSENTIAL HYPERTENSION: ICD-10-CM

## 2018-10-01 DIAGNOSIS — Z12.39 BREAST CANCER SCREENING: ICD-10-CM

## 2018-10-01 DIAGNOSIS — J30.2 SEASONAL ALLERGIC RHINITIS, UNSPECIFIED TRIGGER: ICD-10-CM

## 2018-10-01 DIAGNOSIS — M17.0 ARTHRITIS OF BOTH KNEES: ICD-10-CM

## 2018-10-01 DIAGNOSIS — F32.A ANXIETY AND DEPRESSION: ICD-10-CM

## 2018-10-01 DIAGNOSIS — K21.9 GASTROESOPHAGEAL REFLUX DISEASE, ESOPHAGITIS PRESENCE NOT SPECIFIED: ICD-10-CM

## 2018-10-01 PROCEDURE — 3075F SYST BP GE 130 - 139MM HG: CPT | Mod: CPTII,S$GLB,, | Performed by: REGISTERED NURSE

## 2018-10-01 PROCEDURE — 99999 PR PBB SHADOW E&M-EST. PATIENT-LVL III: CPT | Mod: PBBFAC,,, | Performed by: REGISTERED NURSE

## 2018-10-01 PROCEDURE — 99396 PREV VISIT EST AGE 40-64: CPT | Mod: S$GLB,,, | Performed by: REGISTERED NURSE

## 2018-10-01 PROCEDURE — 3079F DIAST BP 80-89 MM HG: CPT | Mod: CPTII,S$GLB,, | Performed by: REGISTERED NURSE

## 2018-10-01 RX ORDER — DEXLANSOPRAZOLE 60 MG/1
60 CAPSULE, DELAYED RELEASE ORAL DAILY
Qty: 30 CAPSULE | Refills: 11 | Status: SHIPPED | OUTPATIENT
Start: 2018-10-01 | End: 2018-10-01

## 2018-10-01 RX ORDER — MELOXICAM 7.5 MG/1
7.5 TABLET ORAL DAILY
COMMUNITY
End: 2018-10-01

## 2018-10-01 RX ORDER — METOPROLOL SUCCINATE 50 MG/1
50 TABLET, EXTENDED RELEASE ORAL NIGHTLY
Qty: 90 TABLET | Refills: 1 | Status: SHIPPED | OUTPATIENT
Start: 2018-10-01 | End: 2019-07-20 | Stop reason: SDUPTHER

## 2018-10-01 RX ORDER — BUPROPION HYDROCHLORIDE 75 MG/1
75 TABLET ORAL 2 TIMES DAILY
Qty: 180 TABLET | Refills: 1 | Status: SHIPPED | OUTPATIENT
Start: 2018-10-01 | End: 2018-11-30 | Stop reason: SDUPTHER

## 2018-10-01 RX ORDER — MELOXICAM 15 MG/1
15 TABLET ORAL DAILY
Qty: 30 TABLET | Refills: 6 | Status: SHIPPED | OUTPATIENT
Start: 2018-10-01 | End: 2019-10-01

## 2018-10-01 RX ORDER — LANSOPRAZOLE 30 MG/1
30 CAPSULE, DELAYED RELEASE ORAL DAILY
Qty: 30 CAPSULE | Refills: 6 | Status: SHIPPED | OUTPATIENT
Start: 2018-10-01 | End: 2019-09-26

## 2018-10-01 NOTE — PROGRESS NOTES
Subjective:       Patient ID: Mary Russo is a 48 y.o. female.    Chief Complaint: Annual Exam      HPI     Mary is here today for her annual wellness exam.  I have reviewed the patient's medical history in detail and updated the computerized patient record.    GYN --- followed by [facility], last pap smear 1/2016      Review of Systems   Constitutional: Positive for fatigue and unexpected weight change (gain//unintentional//reversal of lap band 10/2017 due to malfunction). Negative for activity change, appetite change, chills, diaphoresis and fever.        Exercise:  Recently starting walking.  Diet:  Admits to poor eating habits, skips meals, goes for long periods of time w/out eating.   HENT: Negative.    Eyes: Negative.    Respiratory: Negative.    Cardiovascular: Negative.         HTN -- does not check home BP, taking Toprol nightly   Gastrointestinal: Positive for abdominal pain (due to GERD//taking Protonix & Zantac). Negative for abdominal distention, anal bleeding, blood in stool, constipation, diarrhea, nausea, rectal pain and vomiting.   Endocrine: Negative for polydipsia, polyphagia and polyuria.   Genitourinary: Negative.    Musculoskeletal: Positive for arthralgias (knee pain due to weight gain). Negative for gait problem, joint swelling and neck pain.   Skin: Negative.    Allergic/Immunologic: Positive for environmental allergies (on Zyrtec prn).   Neurological: Positive for headaches. Negative for dizziness, tremors, syncope, facial asymmetry, speech difficulty, weakness, light-headedness and numbness.   Hematological: Negative for adenopathy. Does not bruise/bleed easily.   Psychiatric/Behavioral: Negative.         Stable, on wellbutrin for anxiety & depression issues         Review of patient's allergies indicates:   Allergen Reactions    Adhesive Swelling    Lisinopril Cough    Latex, natural rubber Localized swelling       Patient Active Problem List   Diagnosis    Arthritis of  "both knees    Essential hypertension    Morbid obesity with BMI of 50.0-59.9, adult    Anxiety and depression    Seasonal allergic rhinitis       Past Medical History:   Diagnosis Date    Acid reflux     Anxiety     Arthritis     Carpal tunnel syndrome of right wrist     Cholelithiases     Hypertension     Morbid obesity     Symptomatic cholelithiasis 7/26/2013       Past Surgical History:   Procedure Laterality Date    CHOLECYSTECTOMY, LAPAROSCOPIC N/A 8/19/2013    Performed by Louis O. Jeansonne IV, MD at [facility] OR    HERNIA REPAIR      LAPAROSCOPIC GASTRIC BANDING      LAPAROSCOPIC GASTRIC BANDING REMOVAL  10/31/2017    TUBAL LIGATION         Family History   Problem Relation Age of Onset    Breast cancer  Mother 49    Heart disease Mother     Diabetes Mother     Stroke Mother     Obesity Mother     Breast cancer Mother     Heart disease Father     Obesity Father     Obesity Sister     Obesity Maternal Grandmother     Obesity Sister     Colon cancer Neg Hx        Social History     Socioeconomic History    Marital status:     Number of children: 3   Occupational History     Employer: [employer]   Tobacco Use    Smoking status: Never Smoker    Smokeless tobacco: Never Used   Substance and Sexual Activity    Alcohol use: Yes     Comment: occassionally    Drug use: No    Sexual activity: Yes         Objective:     Vitals:    10/01/18 0810 10/01/18 0904   BP: (!) 140/82 138/80   Pulse: 89    Temp: 98 °F (36.7 °C)    TempSrc: Oral    Weight: (!) 147.6 kg (325 lb 6.4 oz)    Height: 5' 7" (1.702 m)    PainSc: 0-No pain          Physical Exam   Constitutional: She is oriented to person, place, and time. She appears well-developed and well-nourished. No distress.   HENT:   Head: Normocephalic and atraumatic.   Right Ear: External ear normal.   Left Ear: External ear normal.   Nose: Nose normal.   Mouth/Throat: Oropharynx is clear and moist. No " oropharyngeal exudate.   Eyes: Conjunctivae and EOM are normal. Pupils are equal, round, and reactive to light. Right eye exhibits no discharge. Left eye exhibits no discharge. No scleral icterus.   Neck: Normal range of motion. Neck supple. No JVD present. No tracheal deviation present. No thyromegaly present.   Cardiovascular: Normal rate, regular rhythm, normal heart sounds and intact distal pulses. Exam reveals no gallop and no friction rub.   No murmur heard.  Pulmonary/Chest: Effort normal and breath sounds normal.   Abdominal: Soft. Bowel sounds are normal. She exhibits no distension and no mass. There is no tenderness.   Musculoskeletal: Normal range of motion. She exhibits no edema, tenderness or deformity.   Lymphadenopathy:     She has no cervical adenopathy.   Neurological: She is alert and oriented to person, place, and time. She displays normal reflexes. No cranial nerve deficit or sensory deficit. She exhibits normal muscle tone. Coordination normal.   Skin: Skin is warm and dry. Capillary refill takes less than 2 seconds. No rash noted. She is not diaphoretic. No erythema.   Psychiatric: She has a normal mood and affect. Her behavior is normal. Judgment and thought content normal.   Vitals reviewed.        Current Outpatient Medications on File Prior to Visit   Medication Sig Dispense Refill    buPROPion (WELLBUTRIN) 75 MG tablet TAKE 1 TABLET (75 MG TOTAL) BY MOUTH 2 (TWO) TIMES DAILY. 180 tablet 1    meloxicam (MOBIC) 7.5 MG tablet Take 7.5 mg by mouth once daily.      metoprolol succinate (TOPROL-XL) 50 MG 24 hr tablet TAKE 1 TABLET (50 MG TOTAL) BY MOUTH NIGHTLY. 90 tablet 1    pantoprazole (PROTONIX) 40 MG tablet Take 1 tablet (40 mg total) by mouth 2 (two) times daily. 60 tablet 11         Diagnosis       1. Annual physical exam    2. Essential hypertension    3. Seasonal allergic rhinitis, unspecified trigger    4. Arthritis of both knees    5. Anxiety and depression    6. Gastroesophageal  "reflux disease, esophagitis presence not specified    7. Breast cancer screening          Assessment/ Plan     Annual physical exam  -     Mammo Digital Screening Bilat with CAD; Future; Expected date: 10/01/2018  -     CBC auto differential; Future; Expected date: 10/01/2018  -     Comprehensive metabolic panel; Future; Expected date: 10/01/2018  -     TSH; Future; Expected date: 10/01/2018  -     Lipid panel; Future; Expected date: 10/01/2018  -     Microalbumin/creatinine urine ratio    Essential hypertension  · Stable and controlled, on medication as ordered, to continue.  · Orders:  -     metoprolol succinate (TOPROL-XL) 50 MG 24 hr tablet; Take 1 tablet (50 mg total) by mouth nightly.  Dispense: 90 tablet; Refill: 1  -     CBC auto differential; Future; Expected date: 10/01/2018  -     Comprehensive metabolic panel; Future; Expected date: 10/01/2018  -     TSH; Future; Expected date: 10/01/2018  -     Lipid panel; Future; Expected date: 10/01/2018  -     Microalbumin/creatinine urine ratio    Seasonal allergic rhinitis, unspecified trigger  · Stable, takes Zyrtec prn.    Arthritis of both knees  · Currently on Mobic 7.5 mg daily with reports of not being effective --- increased to 15 mg once daily.  · Elevate, ice packs.  · Avoid triggers such as prolonged standing, stairs, etc.  · Orders:  -     meloxicam (MOBIC) 15 MG tablet; Take 1 tablet (15 mg total) by mouth once daily.  Dispense: 30 tablet; Refill: 6    Anxiety and depression  · Stable at this time, controlled on current medication, to continue.  · Orders:  -     buPROPion (WELLBUTRIN) 75 MG tablet; Take 1 tablet (75 mg total) by mouth 2 (two) times daily.  Dispense: 180 tablet; Refill: 1    Gastroesophageal reflux disease, esophagitis presence not specified  · PT reports not controlled with Zantac and Protonix, "food sits in upper stomach".  · GERD triggers and prevention discussed.  · Orders:  -     dexlansoprazole (DEXILANT) 60 mg capsule; Take 1 " capsule (60 mg total) by mouth once daily.  Dispense: 30 capsule; Refill: 11    Breast cancer screening  -     Mammo Digital Screening Bilat with CAD; Future; Expected date: 10/01/2018        Lab pending.  Mammogram ordered.  Pap smear due 1/2019, followed by [facility].  Follow-up in clinic as needed.        Andrea Cothern, CFNP [facility]

## 2018-10-02 LAB
ALBUMIN SERPL-MCNC: 3.8 G/DL (ref 3.5–5.5)
ALBUMIN/CREAT UR: 6.7 MG/G CREAT (ref 0–30)
ALBUMIN/GLOB SERPL: 1.1 {RATIO} (ref 1.2–2.2)
ALP SERPL-CCNC: 77 IU/L (ref 39–117)
ALT SERPL-CCNC: 6 IU/L (ref 0–32)
AST SERPL-CCNC: 12 IU/L (ref 0–40)
BASOPHILS # BLD AUTO: 0.1 X10E3/UL (ref 0–0.2)
BASOPHILS NFR BLD AUTO: 1 %
BILIRUB SERPL-MCNC: 0.4 MG/DL (ref 0–1.2)
BUN SERPL-MCNC: 8 MG/DL (ref 6–24)
BUN/CREAT SERPL: 9 (ref 9–23)
CALCIUM SERPL-MCNC: 8.9 MG/DL (ref 8.7–10.2)
CHLORIDE SERPL-SCNC: 105 MMOL/L (ref 96–106)
CHOLEST SERPL-MCNC: 170 MG/DL (ref 100–199)
CO2 SERPL-SCNC: 23 MMOL/L (ref 20–29)
CREAT SERPL-MCNC: 0.87 MG/DL (ref 0.57–1)
CREAT UR-MCNC: 142.3 MG/DL
EGFR IF AFRICAN AMERICAN: 92 ML/MIN/1.73
EOSINOPHIL # BLD AUTO: 0.3 X10E3/UL (ref 0–0.4)
EOSINOPHIL NFR BLD AUTO: 3 %
ERYTHROCYTE [DISTWIDTH] IN BLOOD BY AUTOMATED COUNT: 15.1 % (ref 12.3–15.4)
EST. GFR  (NON AFRICAN AMERICAN): 80 ML/MIN/1.73
GLOBULIN SER CALC-MCNC: 3.4 G/DL (ref 1.5–4.5)
GLUCOSE SERPL-MCNC: 92 MG/DL (ref 65–99)
HCT VFR BLD AUTO: 34.9 % (ref 34–46.6)
HDLC SERPL-MCNC: 61 MG/DL
HGB BLD-MCNC: 10.5 G/DL (ref 11.1–15.9)
IMM GRANULOCYTES # BLD: 0 X10E3/UL (ref 0–0.1)
IMM GRANULOCYTES NFR BLD: 0 %
LDLC SERPL CALC-MCNC: 94 MG/DL (ref 0–99)
LYMPHOCYTES # BLD AUTO: 2.6 X10E3/UL (ref 0.7–3.1)
LYMPHOCYTES NFR BLD AUTO: 29 %
MCH RBC QN AUTO: 26.1 PG (ref 26.6–33)
MCHC RBC AUTO-ENTMCNC: 30.1 G/DL (ref 31.5–35.7)
MCV RBC AUTO: 87 FL (ref 79–97)
MICROALBUMIN UR-MCNC: 9.6 UG/ML
MONOCYTES # BLD AUTO: 0.5 X10E3/UL (ref 0.1–0.9)
MONOCYTES NFR BLD AUTO: 6 %
NEUTROPHILS # BLD AUTO: 5.6 X10E3/UL (ref 1.4–7)
NEUTROPHILS NFR BLD AUTO: 61 %
PLATELET # BLD AUTO: 391 X10E3/UL (ref 150–379)
POTASSIUM SERPL-SCNC: 4.7 MMOL/L (ref 3.5–5.2)
PROT SERPL-MCNC: 7.2 G/DL (ref 6–8.5)
RBC # BLD AUTO: 4.03 X10E6/UL (ref 3.77–5.28)
SODIUM SERPL-SCNC: 142 MMOL/L (ref 134–144)
TRIGL SERPL-MCNC: 77 MG/DL (ref 0–149)
TSH SERPL DL<=0.005 MIU/L-ACNC: 1.88 UIU/ML (ref 0.45–4.5)
VLDLC SERPL CALC-MCNC: 15 MG/DL (ref 5–40)
WBC # BLD AUTO: 9 X10E3/UL (ref 3.4–10.8)

## 2018-10-03 PROBLEM — D50.9 IRON DEFICIENCY ANEMIA: Status: ACTIVE | Noted: 2018-10-03

## 2018-10-09 PROBLEM — K21.9 GASTROESOPHAGEAL REFLUX DISEASE: Status: ACTIVE | Noted: 2018-10-09

## 2018-10-12 ENCOUNTER — HOSPITAL ENCOUNTER (OUTPATIENT)
Dept: RADIOLOGY | Facility: HOSPITAL | Age: 48
Discharge: HOME | End: 2018-10-12
Attending: REGISTERED NURSE
Payer: COMMERCIAL

## 2018-10-12 VITALS — WEIGHT: 293 LBS | BODY MASS INDEX: 45.99 KG/M2 | HEIGHT: 67 IN

## 2018-10-12 DIAGNOSIS — Z12.39 BREAST CANCER SCREENING: ICD-10-CM

## 2018-10-12 DIAGNOSIS — Z00.00 ANNUAL PHYSICAL EXAM: ICD-10-CM

## 2018-10-12 PROCEDURE — 77067 SCR MAMMO BI INCL CAD: CPT | Mod: 26,,, | Performed by: RADIOLOGY

## 2018-10-12 PROCEDURE — 77063 BREAST TOMOSYNTHESIS BI: CPT | Mod: 26,,, | Performed by: RADIOLOGY

## 2018-10-12 PROCEDURE — 77063 BREAST TOMOSYNTHESIS BI: CPT | Mod: TC,PO

## 2018-11-30 DIAGNOSIS — F32.A ANXIETY AND DEPRESSION: ICD-10-CM

## 2018-11-30 DIAGNOSIS — F41.9 ANXIETY AND DEPRESSION: ICD-10-CM

## 2018-11-30 RX ORDER — BUPROPION HYDROCHLORIDE 75 MG/1
75 TABLET ORAL 2 TIMES DAILY
Qty: 180 TABLET | Refills: 1 | Status: SHIPPED | OUTPATIENT
Start: 2018-11-30 | End: 2020-01-13

## 2019-01-07 ENCOUNTER — PATIENT MESSAGE (OUTPATIENT)
Dept: FAMILY MEDICINE | Facility: CLINIC | Age: 49
End: 2019-01-07

## 2019-01-08 ENCOUNTER — TELEPHONE (OUTPATIENT)
Dept: FAMILY MEDICINE | Facility: CLINIC | Age: 49
End: 2019-01-08

## 2019-01-08 NOTE — TELEPHONE ENCOUNTER
----- Message from Alma Rosa Addison sent at 1/7/2019 10:23 AM CST -----  Contact: Patient  Patient returned call. She can be contacted at [phone].    Thanks,  Alma Rosa

## 2019-01-08 NOTE — TELEPHONE ENCOUNTER
Pt contacted to inform of [facility] results.//ky

## 2019-04-17 ENCOUNTER — PATIENT MESSAGE (OUTPATIENT)
Dept: FAMILY MEDICINE | Facility: CLINIC | Age: 49
End: 2019-04-17

## 2019-04-23 ENCOUNTER — OFFICE VISIT (OUTPATIENT)
Dept: INTERNAL MEDICINE | Facility: CLINIC | Age: 49
End: 2019-04-23
Payer: COMMERCIAL

## 2019-04-23 VITALS
WEIGHT: 293 LBS | SYSTOLIC BLOOD PRESSURE: 110 MMHG | TEMPERATURE: 97 F | HEART RATE: 95 BPM | OXYGEN SATURATION: 99 % | DIASTOLIC BLOOD PRESSURE: 88 MMHG | BODY MASS INDEX: 50.79 KG/M2

## 2019-04-23 DIAGNOSIS — M62.830 SPASM OF MUSCLE OF LOWER BACK: ICD-10-CM

## 2019-04-23 DIAGNOSIS — Z82.49 FAMILY HISTORY OF CEREBRAL ANEURYSM: ICD-10-CM

## 2019-04-23 DIAGNOSIS — M62.838 MUSCLE SPASMS OF NECK: Primary | ICD-10-CM

## 2019-04-23 PROCEDURE — 3008F PR BODY MASS INDEX (BMI) DOCUMENTED: ICD-10-PCS | Mod: CPTII,S$GLB,, | Performed by: FAMILY MEDICINE

## 2019-04-23 PROCEDURE — 99999 PR PBB SHADOW E&M-EST. PATIENT-LVL III: ICD-10-PCS | Mod: PBBFAC,,, | Performed by: FAMILY MEDICINE

## 2019-04-23 PROCEDURE — 99214 PR OFFICE/OUTPT VISIT, EST, LEVL IV, 30-39 MIN: ICD-10-PCS | Mod: S$GLB,,, | Performed by: FAMILY MEDICINE

## 2019-04-23 PROCEDURE — 99214 OFFICE O/P EST MOD 30 MIN: CPT | Mod: S$GLB,,, | Performed by: FAMILY MEDICINE

## 2019-04-23 PROCEDURE — 3074F SYST BP LT 130 MM HG: CPT | Mod: CPTII,S$GLB,, | Performed by: FAMILY MEDICINE

## 2019-04-23 PROCEDURE — 3074F PR MOST RECENT SYSTOLIC BLOOD PRESSURE < 130 MM HG: ICD-10-PCS | Mod: CPTII,S$GLB,, | Performed by: FAMILY MEDICINE

## 2019-04-23 PROCEDURE — 3008F BODY MASS INDEX DOCD: CPT | Mod: CPTII,S$GLB,, | Performed by: FAMILY MEDICINE

## 2019-04-23 PROCEDURE — 3079F DIAST BP 80-89 MM HG: CPT | Mod: CPTII,S$GLB,, | Performed by: FAMILY MEDICINE

## 2019-04-23 PROCEDURE — 3079F PR MOST RECENT DIASTOLIC BLOOD PRESSURE 80-89 MM HG: ICD-10-PCS | Mod: CPTII,S$GLB,, | Performed by: FAMILY MEDICINE

## 2019-04-23 PROCEDURE — 99999 PR PBB SHADOW E&M-EST. PATIENT-LVL III: CPT | Mod: PBBFAC,,, | Performed by: FAMILY MEDICINE

## 2019-04-23 RX ORDER — NAPROXEN 500 MG/1
500 TABLET ORAL 2 TIMES DAILY
Qty: 28 TABLET | Refills: 0 | Status: SHIPPED | OUTPATIENT
Start: 2019-04-23 | End: 2019-05-07

## 2019-04-23 RX ORDER — CYCLOBENZAPRINE HCL 10 MG
10 TABLET ORAL 3 TIMES DAILY PRN
Qty: 30 TABLET | Refills: 0 | Status: SHIPPED | OUTPATIENT
Start: 2019-04-23 | End: 2019-05-03

## 2019-04-29 ENCOUNTER — TELEPHONE (OUTPATIENT)
Dept: OBSTETRICS AND GYNECOLOGY | Facility: CLINIC | Age: 49
End: 2019-04-29

## 2019-04-29 ENCOUNTER — PATIENT MESSAGE (OUTPATIENT)
Dept: OBSTETRICS AND GYNECOLOGY | Facility: CLINIC | Age: 49
End: 2019-04-29

## 2019-04-29 NOTE — TELEPHONE ENCOUNTER
Attempted to contact patient regarding scheduling annual exam. No answer, left vm to call office. [employer] message sent.
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
## 2019-05-08 ENCOUNTER — TELEPHONE (OUTPATIENT)
Dept: RADIOLOGY | Facility: HOSPITAL | Age: 49
End: 2019-05-08

## 2019-05-09 ENCOUNTER — HOSPITAL ENCOUNTER (OUTPATIENT)
Dept: RADIOLOGY | Facility: HOSPITAL | Age: 49
Discharge: HOME | End: 2019-05-09
Attending: FAMILY MEDICINE
Payer: COMMERCIAL

## 2019-05-09 DIAGNOSIS — Z82.49 FAMILY HISTORY OF CEREBRAL ANEURYSM: ICD-10-CM

## 2019-05-09 PROCEDURE — 70544 MRA BRAIN WITHOUT CONTRAST: ICD-10-PCS | Mod: 26,,, | Performed by: RADIOLOGY

## 2019-05-09 PROCEDURE — 70544 MR ANGIOGRAPHY HEAD W/O DYE: CPT | Mod: 26,,, | Performed by: RADIOLOGY

## 2019-05-09 PROCEDURE — 70544 MR ANGIOGRAPHY HEAD W/O DYE: CPT | Mod: TC

## 2019-07-20 DIAGNOSIS — I10 ESSENTIAL HYPERTENSION: ICD-10-CM

## 2019-07-22 RX ORDER — METOPROLOL SUCCINATE 50 MG/1
TABLET, EXTENDED RELEASE ORAL
Qty: 90 TABLET | Refills: 0 | Status: SHIPPED | OUTPATIENT
Start: 2019-07-22 | End: 2019-08-27 | Stop reason: SDUPTHER

## 2019-08-09 ENCOUNTER — PATIENT MESSAGE (OUTPATIENT)
Dept: ADMINISTRATIVE | Facility: HOSPITAL | Age: 49
End: 2019-08-09

## 2019-08-27 ENCOUNTER — PATIENT MESSAGE (OUTPATIENT)
Dept: ADMINISTRATIVE | Facility: HOSPITAL | Age: 49
End: 2019-08-27

## 2019-08-27 DIAGNOSIS — I10 ESSENTIAL HYPERTENSION: ICD-10-CM

## 2019-08-27 RX ORDER — METOPROLOL SUCCINATE 50 MG/1
50 TABLET, EXTENDED RELEASE ORAL NIGHTLY
Qty: 90 TABLET | Refills: 0 | Status: SHIPPED | OUTPATIENT
Start: 2019-08-27 | End: 2019-08-28 | Stop reason: SDUPTHER

## 2019-08-28 DIAGNOSIS — I10 ESSENTIAL HYPERTENSION: ICD-10-CM

## 2019-08-28 RX ORDER — METOPROLOL SUCCINATE 50 MG/1
50 TABLET, EXTENDED RELEASE ORAL NIGHTLY
Qty: 90 TABLET | Refills: 0 | Status: CANCELLED | OUTPATIENT
Start: 2019-08-28

## 2019-08-29 RX ORDER — METOPROLOL SUCCINATE 50 MG/1
50 TABLET, EXTENDED RELEASE ORAL DAILY
Qty: 90 TABLET | Refills: 0 | Status: SHIPPED | OUTPATIENT
Start: 2019-08-29 | End: 2020-03-31 | Stop reason: SDUPTHER

## 2019-08-30 ENCOUNTER — OFFICE VISIT (OUTPATIENT)
Dept: INTERNAL MEDICINE | Facility: CLINIC | Age: 49
End: 2019-08-30
Payer: COMMERCIAL

## 2019-08-30 VITALS
HEIGHT: 66 IN | SYSTOLIC BLOOD PRESSURE: 130 MMHG | DIASTOLIC BLOOD PRESSURE: 80 MMHG | HEART RATE: 101 BPM | BODY MASS INDEX: 47.09 KG/M2 | OXYGEN SATURATION: 98 % | WEIGHT: 293 LBS | TEMPERATURE: 99 F

## 2019-08-30 DIAGNOSIS — J32.9 SINUSITIS, UNSPECIFIED CHRONICITY, UNSPECIFIED LOCATION: Primary | ICD-10-CM

## 2019-08-30 PROCEDURE — 3075F SYST BP GE 130 - 139MM HG: CPT | Mod: CPTII,S$GLB,, | Performed by: PHYSICIAN ASSISTANT

## 2019-08-30 PROCEDURE — 3079F DIAST BP 80-89 MM HG: CPT | Mod: CPTII,S$GLB,, | Performed by: PHYSICIAN ASSISTANT

## 2019-08-30 PROCEDURE — 3008F BODY MASS INDEX DOCD: CPT | Mod: CPTII,S$GLB,, | Performed by: PHYSICIAN ASSISTANT

## 2019-08-30 PROCEDURE — 3079F PR MOST RECENT DIASTOLIC BLOOD PRESSURE 80-89 MM HG: ICD-10-PCS | Mod: CPTII,S$GLB,, | Performed by: PHYSICIAN ASSISTANT

## 2019-08-30 PROCEDURE — 99999 PR PBB SHADOW E&M-EST. PATIENT-LVL IV: CPT | Mod: PBBFAC,,, | Performed by: PHYSICIAN ASSISTANT

## 2019-08-30 PROCEDURE — 3075F PR MOST RECENT SYSTOLIC BLOOD PRESS GE 130-139MM HG: ICD-10-PCS | Mod: CPTII,S$GLB,, | Performed by: PHYSICIAN ASSISTANT

## 2019-08-30 PROCEDURE — 99214 OFFICE O/P EST MOD 30 MIN: CPT | Mod: S$GLB,,, | Performed by: PHYSICIAN ASSISTANT

## 2019-08-30 PROCEDURE — 99214 PR OFFICE/OUTPT VISIT, EST, LEVL IV, 30-39 MIN: ICD-10-PCS | Mod: S$GLB,,, | Performed by: PHYSICIAN ASSISTANT

## 2019-08-30 PROCEDURE — 99999 PR PBB SHADOW E&M-EST. PATIENT-LVL IV: ICD-10-PCS | Mod: PBBFAC,,, | Performed by: PHYSICIAN ASSISTANT

## 2019-08-30 PROCEDURE — 3008F PR BODY MASS INDEX (BMI) DOCUMENTED: ICD-10-PCS | Mod: CPTII,S$GLB,, | Performed by: PHYSICIAN ASSISTANT

## 2019-08-30 RX ORDER — AZITHROMYCIN 250 MG/1
TABLET, FILM COATED ORAL
Qty: 6 TABLET | Refills: 0 | Status: SHIPPED | OUTPATIENT
Start: 2019-08-30 | End: 2019-09-26 | Stop reason: ALTCHOICE

## 2019-09-26 ENCOUNTER — OFFICE VISIT (OUTPATIENT)
Dept: INTERNAL MEDICINE | Facility: CLINIC | Age: 49
End: 2019-09-26
Payer: COMMERCIAL

## 2019-09-26 VITALS
SYSTOLIC BLOOD PRESSURE: 120 MMHG | OXYGEN SATURATION: 98 % | TEMPERATURE: 99 F | BODY MASS INDEX: 47.09 KG/M2 | DIASTOLIC BLOOD PRESSURE: 80 MMHG | HEART RATE: 96 BPM | HEIGHT: 66 IN | WEIGHT: 293 LBS

## 2019-09-26 DIAGNOSIS — Z13.220 SCREENING FOR LIPOID DISORDERS: ICD-10-CM

## 2019-09-26 DIAGNOSIS — K21.9 GASTROESOPHAGEAL REFLUX DISEASE, ESOPHAGITIS PRESENCE NOT SPECIFIED: Primary | ICD-10-CM

## 2019-09-26 DIAGNOSIS — Z00.00 ROUTINE GENERAL MEDICAL EXAMINATION AT A HEALTH CARE FACILITY: Primary | ICD-10-CM

## 2019-09-26 DIAGNOSIS — Z13.29 THYROID DISORDER SCREEN: ICD-10-CM

## 2019-09-26 PROCEDURE — 3008F PR BODY MASS INDEX (BMI) DOCUMENTED: ICD-10-PCS | Mod: CPTII,S$GLB,, | Performed by: FAMILY MEDICINE

## 2019-09-26 PROCEDURE — 99214 PR OFFICE/OUTPT VISIT, EST, LEVL IV, 30-39 MIN: ICD-10-PCS | Mod: S$GLB,,, | Performed by: FAMILY MEDICINE

## 2019-09-26 PROCEDURE — 3008F BODY MASS INDEX DOCD: CPT | Mod: CPTII,S$GLB,, | Performed by: FAMILY MEDICINE

## 2019-09-26 PROCEDURE — 99214 OFFICE O/P EST MOD 30 MIN: CPT | Mod: S$GLB,,, | Performed by: FAMILY MEDICINE

## 2019-09-26 PROCEDURE — 3074F SYST BP LT 130 MM HG: CPT | Mod: CPTII,S$GLB,, | Performed by: FAMILY MEDICINE

## 2019-09-26 PROCEDURE — 3074F PR MOST RECENT SYSTOLIC BLOOD PRESSURE < 130 MM HG: ICD-10-PCS | Mod: CPTII,S$GLB,, | Performed by: FAMILY MEDICINE

## 2019-09-26 PROCEDURE — 3079F PR MOST RECENT DIASTOLIC BLOOD PRESSURE 80-89 MM HG: ICD-10-PCS | Mod: CPTII,S$GLB,, | Performed by: FAMILY MEDICINE

## 2019-09-26 PROCEDURE — 99999 PR PBB SHADOW E&M-EST. PATIENT-LVL III: CPT | Mod: PBBFAC,,, | Performed by: FAMILY MEDICINE

## 2019-09-26 PROCEDURE — 3079F DIAST BP 80-89 MM HG: CPT | Mod: CPTII,S$GLB,, | Performed by: FAMILY MEDICINE

## 2019-09-26 PROCEDURE — 99999 PR PBB SHADOW E&M-EST. PATIENT-LVL III: ICD-10-PCS | Mod: PBBFAC,,, | Performed by: FAMILY MEDICINE

## 2019-09-26 RX ORDER — PANTOPRAZOLE SODIUM 40 MG/1
40 TABLET, DELAYED RELEASE ORAL DAILY
Qty: 30 TABLET | Refills: 1 | Status: ON HOLD | OUTPATIENT
Start: 2019-09-26 | End: 2019-10-14 | Stop reason: SDUPTHER

## 2019-09-26 NOTE — PATIENT INSTRUCTIONS
Avoid NSAIDs - aleve, advil, mobic, etc.    Lifestyle Changes for Controlling GERD  When you have GERD, stomach acid feels as if its backing up toward your mouth. Whether or not you take medication to control your GERD, your symptoms can often be improved with lifestyle changes. Talk to your doctor about the following suggestions, which may help you get relief from your symptoms.  Raise Your Head    Reflux is more likely to strike when youre lying down flat, because stomach fluid can flow backward more easily. Raising the head of your bed 4-6 inches can help. To do this:  · Slide blocks or books under the legs at the head of your bed. Or, place a wedge under the mattress. Many foam stores can make a suitable wedge for you. The wedge should run from your waist to the top of your head.  · Dont just prop your head on several pillows. This increases pressure on your stomach. It can make GERD worse.  Watch Your Eating Habits  Certain foods may increase the acid in your stomach or relax the lower esophageal sphincter, making GERD more likely. Its best to avoid the following:  · Coffee, tea, and carbonated drinks (with and without caffeine)  · Fatty, fried, or spicy food  · Mint, chocolate, onions, and tomatoes  · Any other foods that seem to irritate your stomach or cause you pain  Relieve the Pressure  · Eat smaller meals, even if you have to eat more often.  · Dont lie down right after you eat. Wait a few hours for your stomach to empty.  · Avoid tight belts and tight-fitting clothes.  · Lose excess weight.  Tobacco and Alcohol  Avoid smoking tobacco and drinking alcohol. They can make GERD symptoms worse.  © 1723-5467 [employer], [address] [place] [address], [place] [address]. All rights reserved. This information is not intended as a substitute for professional medical care. Always follow your healthcare professional's instructions.

## 2019-09-26 NOTE — LETTER
September 26, 2019                   [facility]  Internal Medicine  [address] [place] [id number]  Phone: [phone]  Fax: [phone]   September 26, 2019     Patient: Mary Russo   YOB: 1970   Date of Visit: 9/26/2019       To Whom it May Concern:    Mary Russo was seen in my clinic on 9/26/2019. She may return to work on 9/27/19.    Please excuse her from any classes or work missed.    If you have any questions or concerns, please don't hesitate to call.    Sincerely,         Marquita oRberts LPN

## 2019-09-26 NOTE — PROGRESS NOTES
Subjective:       Patient ID: Mary Russo is a 49 y.o. female.    Chief Complaint: Chest Pain and Gastroesophageal Reflux    Patient presents to clinic today reporting acid reflux since lap band procedure in 2015. She reports having it removed in 2017 as a result. She reports having seen GI in the past and has been taking prevacid.     Review of Systems   Constitutional: Negative for chills, fatigue, fever and unexpected weight change.   Eyes: Negative for visual disturbance.   Respiratory: Negative for shortness of breath.    Cardiovascular: Negative for chest pain.   Musculoskeletal: Negative for myalgias.   Neurological: Negative for headaches.       Objective:      Physical Exam   Constitutional: She is oriented to person, place, and time. She appears well-developed and well-nourished. No distress.   HENT:   Head: Normocephalic and atraumatic.   Eyes: Pupils are equal, round, and reactive to light. Conjunctivae and EOM are normal. No scleral icterus.   Cardiovascular: Normal rate and regular rhythm. Exam reveals no gallop and no friction rub.   No murmur heard.  Pulmonary/Chest: Effort normal and breath sounds normal.   Abdominal: Soft. Bowel sounds are normal. She exhibits no distension and no mass. There is tenderness in the epigastric area. There is no rebound and no guarding.   Neurological: She is alert and oriented to person, place, and time. No cranial nerve deficit. Gait normal.   Psychiatric: She has a normal mood and affect.   Vitals reviewed.      Assessment:       1. Gastroesophageal reflux disease, esophagitis presence not specified        Plan:     Problem List Items Addressed This Visit     Gastroesophageal reflux disease - Primary    Relevant Medications    pantoprazole (PROTONIX) 40 MG tablet    Other Relevant Orders    Ambulatory referral to Gastroenterology    H.Pylori Antibody IgG (Completed)

## 2019-09-27 LAB — H PYLORI IGG SER IA-ACNC: 0.67 INDEX VALUE (ref 0–0.79)

## 2019-10-01 ENCOUNTER — OFFICE VISIT (OUTPATIENT)
Dept: GASTROENTEROLOGY | Facility: CLINIC | Age: 49
End: 2019-10-01
Payer: COMMERCIAL

## 2019-10-01 VITALS
BODY MASS INDEX: 47.09 KG/M2 | HEIGHT: 66 IN | DIASTOLIC BLOOD PRESSURE: 90 MMHG | HEART RATE: 106 BPM | WEIGHT: 293 LBS | OXYGEN SATURATION: 98 % | SYSTOLIC BLOOD PRESSURE: 142 MMHG

## 2019-10-01 DIAGNOSIS — K21.00 GASTROESOPHAGEAL REFLUX DISEASE WITH ESOPHAGITIS: Primary | ICD-10-CM

## 2019-10-01 DIAGNOSIS — Z12.11 COLON CANCER SCREENING: ICD-10-CM

## 2019-10-01 DIAGNOSIS — R10.12 LUQ PAIN: ICD-10-CM

## 2019-10-01 PROCEDURE — 3008F BODY MASS INDEX DOCD: CPT | Mod: CPTII,S$GLB,, | Performed by: PHYSICIAN ASSISTANT

## 2019-10-01 PROCEDURE — 99999 PR PBB SHADOW E&M-EST. PATIENT-LVL IV: ICD-10-PCS | Mod: PBBFAC,,, | Performed by: PHYSICIAN ASSISTANT

## 2019-10-01 PROCEDURE — 3080F DIAST BP >= 90 MM HG: CPT | Mod: CPTII,S$GLB,, | Performed by: PHYSICIAN ASSISTANT

## 2019-10-01 PROCEDURE — 99214 OFFICE O/P EST MOD 30 MIN: CPT | Mod: S$GLB,,, | Performed by: PHYSICIAN ASSISTANT

## 2019-10-01 PROCEDURE — 99214 PR OFFICE/OUTPT VISIT, EST, LEVL IV, 30-39 MIN: ICD-10-PCS | Mod: S$GLB,,, | Performed by: PHYSICIAN ASSISTANT

## 2019-10-01 PROCEDURE — 3077F PR MOST RECENT SYSTOLIC BLOOD PRESSURE >= 140 MM HG: ICD-10-PCS | Mod: CPTII,S$GLB,, | Performed by: PHYSICIAN ASSISTANT

## 2019-10-01 PROCEDURE — 3008F PR BODY MASS INDEX (BMI) DOCUMENTED: ICD-10-PCS | Mod: CPTII,S$GLB,, | Performed by: PHYSICIAN ASSISTANT

## 2019-10-01 PROCEDURE — 3077F SYST BP >= 140 MM HG: CPT | Mod: CPTII,S$GLB,, | Performed by: PHYSICIAN ASSISTANT

## 2019-10-01 PROCEDURE — 3080F PR MOST RECENT DIASTOLIC BLOOD PRESSURE >= 90 MM HG: ICD-10-PCS | Mod: CPTII,S$GLB,, | Performed by: PHYSICIAN ASSISTANT

## 2019-10-01 PROCEDURE — 99999 PR PBB SHADOW E&M-EST. PATIENT-LVL IV: CPT | Mod: PBBFAC,,, | Performed by: PHYSICIAN ASSISTANT

## 2019-10-01 RX ORDER — SUCRALFATE 1 G/1
1 TABLET ORAL 4 TIMES DAILY
Qty: 120 TABLET | Refills: 0 | Status: SHIPPED | OUTPATIENT
Start: 2019-10-01 | End: 2019-10-31

## 2019-10-01 NOTE — PATIENT INSTRUCTIONS
Stop Nexium. Continue Protonix twice a day.   Start Sucralfate (Carafate) four times a day.       Tips to Control Acid Reflux    To control acid reflux, youll need to make some basic diet and lifestyle changes. The simple steps outlined below may be all youll need to ease discomfort.  Watch what you eat  · Avoid fatty foods and spicy foods.  · Eat fewer acidic foods, such as citrus and tomato-based foods. These can increase symptoms.  · Limit drinking alcohol, caffeine, and fizzy beverages. All increase acid reflux.  · Try limiting chocolate, peppermint, and spearmint. These can worsen acid reflux in some people.  Watch when you eat  · Avoid lying down for 3 hours after eating.  · Do not snack before going to bed.  Raise your head  Raising your head and upper body by 4 to 6 inches helps limit reflux when youre lying down. Put blocks under the head of your bed frame to raise it.  Other changes  · Lose weight, if you need to  · Dont exercise near bedtime  · Avoid tight-fitting clothes  · Limit aspirin and ibuprofen  · Stop smoking   Date Last Reviewed: 7/1/2016  © 2535-1978 [employer]. [address] [place] [address], [place] [address]. All rights reserved. This information is not intended as a substitute for professional medical care. Always follow your healthcare professional's instructions.

## 2019-10-01 NOTE — MEDICAL/APP STUDENT
Subjective:       Patient ID: Mary Russo is a 49 y.o. female.    Chief Complaint: Gastroesophageal Reflux and Abdominal Pain (left lower abdomen pain off and on)    HPI   This is a 50 yo F with h/o GERD who presents to clinic today for initial evaluation. The patient complains of worsening reflux over the past several years. She states that recently a relative who has similar symptoms started to have more complications that require prolonged hospital admission and this prompted her for today's visit.  She reports that she had a Lap band placed in 2013 but was removed in 2017 due to severe reflux and chronic reccurent sinus infections. She reports that the reflux is so bad that it even occurs with water now. There is associated nausea about 2-3 times a month and constant stabbing left upper abd pain near the ribs. These symptoms are worsened with fried food and temporarily relieved with 800 mg of ibuprofen and OTC tylenol. She reports taking Protonix 40 mg BID and also needs additional OTC medications like nexium, or zantac, and rolaids before getting any relief of reflux. She states that her PCP told her to discontinue all NSAIDs including mobic and tylenol last week. The patient denies vomiting, hematochezia, melena, appetite changes, constipation, diarrhea, dysphagia, or unexplained weight changes. She denies smoking history and states that she drinks 0-1 cocktails or a glass of wine a month.     Of note, last EGD in 2/2018 showed hital hernia and Grade B reflux esophagitis. She's never had colonoscopy.    Review of Systems   Constitutional: Negative for chills and fever.   HENT: Negative for congestion and rhinorrhea.    Eyes: Negative for photophobia and pain.   Respiratory: Positive for shortness of breath.    Gastrointestinal:        As per HPI     Endocrine: Negative for polydipsia and polyuria.   Genitourinary: Negative for dysuria and hematuria.   Musculoskeletal: Positive for arthralgias.    Skin: Negative for color change and rash.   Neurological: Negative for dizziness and weakness.   All other systems reviewed and are negative.      Objective:      Physical Exam   Constitutional: She is oriented to person, place, and time. She appears well-developed and well-nourished.   HENT:   Head: Normocephalic and atraumatic.   Right Ear: External ear normal.   Left Ear: External ear normal.   Nose: Nose normal.   Mouth/Throat: Oropharynx is clear and moist.   Eyes: Pupils are equal, round, and reactive to light. Conjunctivae and EOM are normal. Right eye exhibits no discharge. Left eye exhibits no discharge. No scleral icterus.   Neck: Normal range of motion. Neck supple. No JVD present.   Cardiovascular: Regular rhythm, normal heart sounds, intact distal pulses and normal pulses. Tachycardia present.   No murmur heard.  Pulmonary/Chest: Effort normal and breath sounds normal. No respiratory distress. She has no rales. She exhibits no tenderness.   Abdominal: Soft. Bowel sounds are normal. There is tenderness in the left upper quadrant.   Musculoskeletal: Normal range of motion.   Neurological: She is alert and oriented to person, place, and time.   Skin: Skin is warm and dry. Capillary refill takes less than 2 seconds.   Psychiatric: She has a normal mood and affect. Her behavior is normal. Judgment and thought content normal.   Vitals reviewed.      Assessment:       1. Gastroesophageal reflux disease with esophagitis    2. Colon cancer screening    3. LUQ pain        Plan:       - scheduled EGD for further evaluation of worsening reflux  - discussed with pt the need for colon cancer screening and discussed options of colonoscopy or fit kit. Will defer colonoscopy until upper scope results are back and recommendations are made to improve current symptoms.   - start Carafate x 4 weeks  - discussed lifestyle modifications like decreased caffeine intake, investing in a wedge pillow for proper sleeping  position, and refrain from solid PO intake 1-2 hours prior to bed time to reduce reflux symptoms.   - continue Protonix 40 mg BID.

## 2019-10-01 NOTE — PROGRESS NOTES
GI OUTPATIENT NOTE    PCP: Deloris Aiken [address] [facility]  / [place] [id number]    Chief Complaint   Patient presents with    Gastroesophageal Reflux    Abdominal Pain     left lower abdomen pain off and on       HISTORY OF PRESENT ILLNESS:  49 y.o. female presents to the GI clinic today for initial evaluation. The patient complains of worsening reflux over the past several years. She states that recently a relative who has similar symptoms started to have more complications that require prolonged hospital admission and this prompted her for today's visit.  She reports that she had a Lap band placed in 2013 but was removed in 2017 due to severe reflux and chronic reccurent sinus infections. She reports that the reflux is so bad that it even occurs with water now. There is associated nausea about 2-3 times a month and constant stabbing left upper abd pain near the ribs. These symptoms are worsened with fried food and temporarily relieved with 800 mg of ibuprofen and OTC tylenol. She reports taking Protonix 40 mg BID and also needs additional OTC medications like nexium, or zantac, and rolaids before getting any relief of reflux. She states that her PCP told her to discontinue all NSAIDs including mobic and tylenol last week. The patient denies vomiting, hematochezia, melena, appetite changes, constipation, diarrhea, dysphagia, or unexplained weight changes. She denies smoking history and states that she drinks 0-1 cocktails or a glass of wine a month.      Of note, last EGD in 2/2018 showed hital hernia and Grade B reflux esophagitis. She's never had colonoscopy.    Past Medical History:   Diagnosis Date    Acid reflux     Anxiety     Arthritis     Carpal tunnel syndrome of right wrist     Cholelithiases     Hypertension     Morbid obesity        Past Surgical History:   Procedure Laterality Date    CHOLECYSTECTOMY      HERNIA REPAIR      LAPAROSCOPIC GASTRIC BANDING  10/31/2017    removed     REFRACTIVE SURGERY      TUBAL LIGATION         Social History     Socioeconomic History    Marital status:      Spouse name: Not on file    Number of children: 3    Years of education: Not on file    Highest education level: Not on file   Occupational History     Employer: [employer]   Social Needs    Financial resource strain: Not very hard    Food insecurity:     Worry: Never true     Inability: Never true    Transportation needs:     Medical: Not on file     Non-medical: No   Tobacco Use    Smoking status: Never Smoker    Smokeless tobacco: Never Used   Substance and Sexual Activity    Alcohol use: Yes     Frequency: Monthly or less     Drinks per session: 1 or 2     Binge frequency: Never     Comment: occassionally    Drug use: No    Sexual activity: Yes   Lifestyle    Physical activity:     Days per week: 2 days     Minutes per session: 20 min    Stress: Not at all   Relationships    Social connections:     Talks on phone: More than three times a week     Gets together: Once a week     Attends Taoism service: Not on file     Active member of club or organization: No     Attends meetings of clubs or organizations: Never     Relationship status:    Other Topics Concern    Not on file   Social History Narrative    Not on file       Family History   Problem Relation Age of Onset    Cancer Mother 49        breast cancer    Heart disease Mother     Diabetes Mother     Stroke Mother     Obesity Mother     Breast cancer Mother     Heart disease Father     Obesity Father     Obesity Sister     Obesity Maternal Grandmother     Obesity Sister     Colon cancer Neg Hx        MEDS/ALLERGY:  The patient's medications and allergies were reviewed and updated in the EPIC chart.     Review of Systems   Constitutional: Negative for fever.   HENT: Negative for hearing loss.    Eyes: Negative for visual disturbance.   Respiratory: Positive for  shortness of breath (none currently). Negative for cough.    Cardiovascular: Negative for chest pain.   Gastrointestinal:        As per HPI.   Genitourinary: Negative for dysuria, frequency and hematuria.   Musculoskeletal: Positive for arthralgias. Negative for back pain.   Skin: Negative for rash.   Neurological: Negative for seizures, syncope, numbness and headaches.   Hematological: Does not bruise/bleed easily.   Psychiatric/Behavioral: The patient is not nervous/anxious.        Physical Exam   Constitutional: She is oriented to person, place, and time. Vital signs are normal. She appears well-developed and well-nourished.   HENT:   Head: Normocephalic and atraumatic.   Eyes: EOM are normal.   Neck: Normal range of motion. Neck supple. Carotid bruit is not present. No thyromegaly present.   Cardiovascular: Regular rhythm. Tachycardia present.   No murmur heard.  Pulmonary/Chest: Effort normal and breath sounds normal. No respiratory distress. She has no wheezes.   Abdominal: Soft. Normal appearance and bowel sounds are normal. She exhibits no distension and no mass. There is tenderness in the left upper quadrant.   Musculoskeletal: She exhibits no edema.   Neurological: She is alert and oriented to person, place, and time. No cranial nerve deficit. Gait normal.   Skin: Skin is warm and dry. No rash noted.   Psychiatric: Thought content normal.       LABS/IMAGING:   Pertinent results were reviewed.     ASSESSMENT:  1. Gastroesophageal reflux disease with esophagitis    2. Colon cancer screening    3. LUQ pain        PLAN:  Schedule EGD.   Stop Nexium. Continue Protonix twice a day.   Start Sucralfate (Carafate) four times a day.   I think she would benefit from General surgery consult to discuss surgical therapy. However, I will wait to see how she responds to Carafate and EGD results.     Medications Ordered This Encounter   Medications    sucralfate (CARAFATE) 1 gram tablet     Sig: Take 1 tablet (1 g total)  by mouth 4 (four) times daily.     Dispense:  120 tablet     Refill:  0       Follow up in about 4 weeks (around 10/29/2019).     Thank you for the opportunity to participate in the care of this patient. This consult was designated to me by my supervising physician. He fully participated in the development of the assessment and recommendations.    Anurag Tay PA-C.

## 2019-10-01 NOTE — LETTER
October 2, 2019      Deloris Aiken MD  [address] [place] [address] [address] [place] [id number]           [facility]  [address] [place] [address] [place] [address]  Phone: [phone]  Fax: [phone]          Patient: Mary Russo   MR Number: [id number]   YOB: 1970   Date of Visit: 10/1/2019       Dear Dr. Deloris Aiken:    Thank you for referring Mary Russo to me for evaluation. Attached you will find relevant portions of my assessment and plan of care.    If you have questions, please do not hesitate to call me. I look forward to following Mary Russo along with you.    Sincerely,    FLORA Zepeda  CC:  No Recipients    If you would like to receive this communication electronically, please contact [email or web address] or [phone] to request more information on [employer] access.    For providers and/or their staff who would like to refer a patient to [facility], please contact us through our one-stop-shop provider referral line, [facility], at [phone].    If you feel you have received this communication in error or would no longer like to receive these types of communications, please e-mail [email or web address]

## 2019-10-02 LAB
ALBUMIN SERPL-MCNC: 3.6 G/DL (ref 3.5–5.5)
ALBUMIN/GLOB SERPL: 1 {RATIO} (ref 1.2–2.2)
ALP SERPL-CCNC: 71 IU/L (ref 39–117)
ALT SERPL-CCNC: 6 IU/L (ref 0–32)
AST SERPL-CCNC: 12 IU/L (ref 0–40)
BASOPHILS # BLD AUTO: 0.1 X10E3/UL (ref 0–0.2)
BASOPHILS NFR BLD AUTO: 1 %
BILIRUB SERPL-MCNC: 0.3 MG/DL (ref 0–1.2)
BUN SERPL-MCNC: 8 MG/DL (ref 6–24)
BUN/CREAT SERPL: 11 (ref 9–23)
CALCIUM SERPL-MCNC: 9.1 MG/DL (ref 8.7–10.2)
CHLORIDE SERPL-SCNC: 103 MMOL/L (ref 96–106)
CHOLEST SERPL-MCNC: 159 MG/DL (ref 100–199)
CO2 SERPL-SCNC: 23 MMOL/L (ref 20–29)
CREAT SERPL-MCNC: 0.73 MG/DL (ref 0.57–1)
EOSINOPHIL # BLD AUTO: 0.3 X10E3/UL (ref 0–0.4)
EOSINOPHIL NFR BLD AUTO: 3 %
ERYTHROCYTE [DISTWIDTH] IN BLOOD BY AUTOMATED COUNT: 16.6 % (ref 12.3–15.4)
GLOBULIN SER CALC-MCNC: 3.5 G/DL (ref 1.5–4.5)
GLUCOSE SERPL-MCNC: 87 MG/DL (ref 65–99)
HCT VFR BLD AUTO: 31 % (ref 34–46.6)
HDLC SERPL-MCNC: 57 MG/DL
HGB BLD-MCNC: 8.8 G/DL (ref 11.1–15.9)
IMM GRANULOCYTES # BLD AUTO: 0 X10E3/UL (ref 0–0.1)
IMM GRANULOCYTES NFR BLD AUTO: 0 %
LDLC SERPL CALC-MCNC: 85 MG/DL (ref 0–99)
LYMPHOCYTES # BLD AUTO: 2.1 X10E3/UL (ref 0.7–3.1)
LYMPHOCYTES NFR BLD AUTO: 25 %
MCH RBC QN AUTO: 22.5 PG (ref 26.6–33)
MCHC RBC AUTO-ENTMCNC: 28.4 G/DL (ref 31.5–35.7)
MCV RBC AUTO: 79 FL (ref 79–97)
MONOCYTES # BLD AUTO: 0.5 X10E3/UL (ref 0.1–0.9)
MONOCYTES NFR BLD AUTO: 6 %
NEUTROPHILS # BLD AUTO: 5.6 X10E3/UL (ref 1.4–7)
NEUTROPHILS NFR BLD AUTO: 65 %
PLATELET # BLD AUTO: 422 X10E3/UL (ref 150–450)
POTASSIUM SERPL-SCNC: 4.4 MMOL/L (ref 3.5–5.2)
PROT SERPL-MCNC: 7.1 G/DL (ref 6–8.5)
RBC # BLD AUTO: 3.91 X10E6/UL (ref 3.77–5.28)
SODIUM SERPL-SCNC: 140 MMOL/L (ref 134–144)
TRIGL SERPL-MCNC: 87 MG/DL (ref 0–149)
TSH SERPL DL<=0.005 MIU/L-ACNC: 1.74 UIU/ML (ref 0.45–4.5)
VLDLC SERPL CALC-MCNC: 17 MG/DL (ref 5–40)
WBC # BLD AUTO: 8.6 X10E3/UL (ref 3.4–10.8)

## 2019-10-04 ENCOUNTER — TELEPHONE (OUTPATIENT)
Dept: INTERNAL MEDICINE | Facility: CLINIC | Age: 49
End: 2019-10-04

## 2019-10-04 DIAGNOSIS — D64.9 ANEMIA, UNSPECIFIED TYPE: Primary | ICD-10-CM

## 2019-10-04 NOTE — TELEPHONE ENCOUNTER
----- Message from Anurag Tay PA-C sent at 10/2/2019  2:41 PM CDT -----  She is scheduled for the 14th. I will have the staff add her to the cancellation list. I did add Carafate when I saw her yesterday.   Thanks,   Anurag

## 2019-10-04 NOTE — TELEPHONE ENCOUNTER
Please schedule follow up labs for patient in the next week or two. CBC, iron, ferritin. Let her know to keep appt with GI for evaluation. Seek medical attention for any problems. Thank you.

## 2019-10-07 DIAGNOSIS — Z12.31 ENCOUNTER FOR SCREENING MAMMOGRAM FOR BREAST CANCER: Primary | ICD-10-CM

## 2019-10-10 DIAGNOSIS — D64.9 ANEMIA, UNSPECIFIED TYPE: ICD-10-CM

## 2019-10-10 DIAGNOSIS — D72.829 LEUKOCYTOSIS, UNSPECIFIED TYPE: Primary | ICD-10-CM

## 2019-10-10 LAB
BASOPHILS # BLD AUTO: 0.1 X10E3/UL (ref 0–0.2)
BASOPHILS NFR BLD AUTO: 1 %
EOSINOPHIL # BLD AUTO: 0.4 X10E3/UL (ref 0–0.4)
EOSINOPHIL NFR BLD AUTO: 3 %
ERYTHROCYTE [DISTWIDTH] IN BLOOD BY AUTOMATED COUNT: 17.4 % (ref 12.3–15.4)
FERRITIN SERPL-MCNC: 26 NG/ML (ref 15–150)
HCT VFR BLD AUTO: 31.2 % (ref 34–46.6)
HGB BLD-MCNC: 9.2 G/DL (ref 11.1–15.9)
IMM GRANULOCYTES # BLD AUTO: 0 X10E3/UL (ref 0–0.1)
IMM GRANULOCYTES NFR BLD AUTO: 0 %
IRON SATN MFR SERPL: 24 % (ref 15–55)
IRON SERPL-MCNC: 88 UG/DL (ref 27–159)
LYMPHOCYTES # BLD AUTO: 3.3 X10E3/UL (ref 0.7–3.1)
LYMPHOCYTES NFR BLD AUTO: 27 %
MCH RBC QN AUTO: 23.1 PG (ref 26.6–33)
MCHC RBC AUTO-ENTMCNC: 29.5 G/DL (ref 31.5–35.7)
MCV RBC AUTO: 78 FL (ref 79–97)
MONOCYTES # BLD AUTO: 0.7 X10E3/UL (ref 0.1–0.9)
MONOCYTES NFR BLD AUTO: 6 %
NEUTROPHILS # BLD AUTO: 7.7 X10E3/UL (ref 1.4–7)
NEUTROPHILS NFR BLD AUTO: 63 %
PLATELET # BLD AUTO: 429 X10E3/UL (ref 150–450)
RBC # BLD AUTO: 3.99 X10E6/UL (ref 3.77–5.28)
TIBC SERPL-MCNC: 362 UG/DL (ref 250–450)
UIBC SERPL-MCNC: 274 UG/DL (ref 131–425)
WBC # BLD AUTO: 12.2 X10E3/UL (ref 3.4–10.8)

## 2019-10-11 ENCOUNTER — PATIENT MESSAGE (OUTPATIENT)
Dept: INTERNAL MEDICINE | Facility: CLINIC | Age: 49
End: 2019-10-11

## 2019-10-14 ENCOUNTER — HOSPITAL ENCOUNTER (OUTPATIENT)
Facility: HOSPITAL | Age: 49
Discharge: HOME | End: 2019-10-14
Attending: INTERNAL MEDICINE | Admitting: INTERNAL MEDICINE
Payer: COMMERCIAL

## 2019-10-14 ENCOUNTER — ANESTHESIA (OUTPATIENT)
Dept: ENDOSCOPY | Facility: HOSPITAL | Age: 49
End: 2019-10-14
Payer: COMMERCIAL

## 2019-10-14 ENCOUNTER — ANESTHESIA EVENT (OUTPATIENT)
Dept: ENDOSCOPY | Facility: HOSPITAL | Age: 49
End: 2019-10-14
Payer: COMMERCIAL

## 2019-10-14 DIAGNOSIS — K21.9 GASTROESOPHAGEAL REFLUX DISEASE, ESOPHAGITIS PRESENCE NOT SPECIFIED: Primary | ICD-10-CM

## 2019-10-14 DIAGNOSIS — K21.9 GERD (GASTROESOPHAGEAL REFLUX DISEASE): ICD-10-CM

## 2019-10-14 PROBLEM — R10.13 EPIGASTRIC ABDOMINAL PAIN: Status: ACTIVE | Noted: 2019-10-14

## 2019-10-14 LAB
B-HCG UR QL: NEGATIVE
CTP QC/QA: YES

## 2019-10-14 PROCEDURE — 81025 URINE PREGNANCY TEST: CPT | Performed by: INTERNAL MEDICINE

## 2019-10-14 PROCEDURE — 63600175 PHARM REV CODE 636 W HCPCS: Performed by: NURSE ANESTHETIST, CERTIFIED REGISTERED

## 2019-10-14 PROCEDURE — 43239 EGD BIOPSY SINGLE/MULTIPLE: CPT | Performed by: INTERNAL MEDICINE

## 2019-10-14 PROCEDURE — 37000008 HC ANESTHESIA 1ST 15 MINUTES: Performed by: INTERNAL MEDICINE

## 2019-10-14 PROCEDURE — 88305 TISSUE EXAM BY PATHOLOGIST: CPT | Performed by: PATHOLOGY

## 2019-10-14 PROCEDURE — 63600175 PHARM REV CODE 636 W HCPCS: Performed by: INTERNAL MEDICINE

## 2019-10-14 PROCEDURE — 43239 PR EGD, FLEX, W/BIOPSY, SGL/MULTI: ICD-10-PCS | Mod: ,,, | Performed by: INTERNAL MEDICINE

## 2019-10-14 PROCEDURE — 43239 EGD BIOPSY SINGLE/MULTIPLE: CPT | Mod: ,,, | Performed by: INTERNAL MEDICINE

## 2019-10-14 PROCEDURE — 37000009 HC ANESTHESIA EA ADD 15 MINS: Performed by: INTERNAL MEDICINE

## 2019-10-14 PROCEDURE — 88305 TISSUE EXAM BY PATHOLOGIST: CPT | Mod: 26,,, | Performed by: PATHOLOGY

## 2019-10-14 PROCEDURE — 27201012 HC FORCEPS, HOT/COLD, DISP: Performed by: INTERNAL MEDICINE

## 2019-10-14 PROCEDURE — 88305 TISSUE SPECIMEN TO PATHOLOGY - SURGERY: ICD-10-PCS | Mod: 26,,, | Performed by: PATHOLOGY

## 2019-10-14 RX ORDER — SODIUM CHLORIDE, SODIUM LACTATE, POTASSIUM CHLORIDE, CALCIUM CHLORIDE 600; 310; 30; 20 MG/100ML; MG/100ML; MG/100ML; MG/100ML
INJECTION, SOLUTION INTRAVENOUS CONTINUOUS
Status: DISCONTINUED | OUTPATIENT
Start: 2019-10-14 | End: 2019-10-14 | Stop reason: HOSPADM

## 2019-10-14 RX ORDER — LIDOCAINE HCL/PF 100 MG/5ML
SYRINGE (ML) INTRAVENOUS
Status: DISCONTINUED | OUTPATIENT
Start: 2019-10-14 | End: 2019-10-14

## 2019-10-14 RX ORDER — PANTOPRAZOLE SODIUM 40 MG/1
40 TABLET, DELAYED RELEASE ORAL 2 TIMES DAILY
Qty: 60 TABLET | Refills: 3 | Status: SHIPPED | OUTPATIENT
Start: 2019-10-14 | End: 2019-11-13

## 2019-10-14 RX ORDER — PROPOFOL 10 MG/ML
INJECTION, EMULSION INTRAVENOUS
Status: DISCONTINUED | OUTPATIENT
Start: 2019-10-14 | End: 2019-10-14

## 2019-10-14 RX ADMIN — PROPOFOL 100 MG: 10 INJECTION, EMULSION INTRAVENOUS at 08:10

## 2019-10-14 RX ADMIN — LIDOCAINE HYDROCHLORIDE 50 MG: 20 INJECTION, SOLUTION INTRAVENOUS at 08:10

## 2019-10-14 RX ADMIN — PROPOFOL 50 MG: 10 INJECTION, EMULSION INTRAVENOUS at 08:10

## 2019-10-14 RX ADMIN — PROPOFOL 30 MG: 10 INJECTION, EMULSION INTRAVENOUS at 08:10

## 2019-10-14 RX ADMIN — SODIUM CHLORIDE, SODIUM LACTATE, POTASSIUM CHLORIDE, AND CALCIUM CHLORIDE: 600; 310; 30; 20 INJECTION, SOLUTION INTRAVENOUS at 08:10

## 2019-10-14 NOTE — H&P
Short Stay Endoscopy History and Physical    PCP - Deloris Aiken MD    Procedure - EGD  ASA - 2  Mallampati - per anesthesia  History of Anesthesia problems - no  Family history Anesthesia problems -  no     HPI:  This is a 49 y.o. female here for evaluation of :   Active Hospital Problems    Diagnosis  POA    *Gastroesophageal reflux disease [K21.9]  Yes    GERD (gastroesophageal reflux disease) [K21.9]  Yes    Epigastric abdominal pain [R10.13]  Clinically Undetermined      Resolved Hospital Problems   No resolved problems to display.         Reflux - yes  Dysphagia - no  Abdominal pain - no  Diarrhea - no  Anemia - no  GI bleeding - no    ROS:  CONSTITUTIONAL: Denies weight change,  fatigue, fevers, chills, night sweats.  CARDIOVASCULAR: Denies chest pain, shortness of breath, orthopnea and edema.  RESPIRATORY: Denies cough, hemoptysis, dyspnea, and wheezing.  GI: See HPI.    Medical History:   Past Medical History:   Diagnosis Date    Acid reflux     Anxiety     Arthritis     Carpal tunnel syndrome of right wrist     Cholelithiases     Hypertension     Morbid obesity        Surgical History:   Past Surgical History:   Procedure Laterality Date    CHOLECYSTECTOMY      HERNIA REPAIR      LAPAROSCOPIC GASTRIC BANDING  10/31/2017    removed    REFRACTIVE SURGERY      TUBAL LIGATION         Family History:  Family History   Problem Relation Age of Onset    Cancer Mother 49        breast cancer    Heart disease Mother     Diabetes Mother     Stroke Mother     Obesity Mother     Breast cancer Mother     Heart disease Father     Obesity Father     Obesity Sister     Obesity Maternal Grandmother     Obesity Sister     Colon cancer Neg Hx        Social History:   Social History     Tobacco Use    Smoking status: Never Smoker    Smokeless tobacco: Never Used   Substance Use Topics    Alcohol use: Yes     Frequency: Monthly or less     Drinks per session: 1 or 2     Binge frequency: Never      Comment: occassionally    Drug use: No       Allergy  Review of patient's allergies indicates:   Allergen Reactions    Adhesive Swelling     Swelling x2 with flu shot and band-aid.  Swells in shape of band-aid.    Lisinopril Other (See Comments)     Cough    Latex, natural rubber Swelling     Localized swelling at site of bandaid/adhesive       Medications:   No current facility-administered medications on file prior to encounter.      Current Outpatient Medications on File Prior to Encounter   Medication Sig Dispense Refill    buPROPion (WELLBUTRIN) 75 MG tablet Take 1 tablet (75 mg total) by mouth 2 (two) times daily. 180 tablet 1    metoprolol succinate (TOPROL-XL) 50 MG 24 hr tablet Take 1 tablet (50 mg total) by mouth once daily. 90 tablet 0    pantoprazole (PROTONIX) 40 MG tablet Take 1 tablet (40 mg total) by mouth once daily. 30 tablet 1    sucralfate (CARAFATE) 1 gram tablet Take 1 tablet (1 g total) by mouth 4 (four) times daily. 120 tablet 0       Physical Exam:  Vital Signs:   Vitals:    10/14/19 0759   BP: (!) 142/81   Pulse: 88   Resp: 17   Temp: 97.9 °F (36.6 °C)     General Appearance: Well appearing in no acute distress  ENT: OP clear  Chest: CTA B  CV: RRR, no m/r/g  Abd: s/nt/nd/nabs  Ext: no edema    Labs:  Reviewed    IMP:  Active Hospital Problems    Diagnosis  POA    *Gastroesophageal reflux disease [K21.9]  Yes    GERD (gastroesophageal reflux disease) [K21.9]  Yes    Epigastric abdominal pain [R10.13]  Clinically Undetermined      Resolved Hospital Problems   No resolved problems to display.         Plan:  I have explained the risks and benefits of endoscopy procedures to the patient including but not limited to bleeding, perforation, infection, and death. The patient wishes to proceed.

## 2019-10-14 NOTE — ANESTHESIA PREPROCEDURE EVALUATION
10/14/2019  Mary Russo is a 49 y.o., female.    Anesthesia Evaluation    I have reviewed the Patient Summary Reports.    I have reviewed the Nursing Notes.   I have reviewed the Medications.     Review of Systems  Anesthesia Hx:  No problems with previous Anesthesia  Denies Family Hx of Anesthesia complications.   Denies Personal Hx of Anesthesia complications.   Social:  Non-Smoker    Hematology/Oncology:     Oncology Normal     EENT/Dental:EENT/Dental Normal   Cardiovascular:   Exercise tolerance: good Hypertension    Pulmonary:  Pulmonary Normal    Renal/:  Renal/ Normal     Hepatic/GI:   GERD, poorly controlled    Musculoskeletal:  Musculoskeletal Normal    Neurological:   Neuromuscular Disease,    Endocrine:  Endocrine Normal    Dermatological:  Skin Normal    Psych:   Psychiatric History          Physical Exam  General:  Morbid Obesity    Airway/Jaw/Neck:  Airway Findings: Mouth Opening: Normal Tongue: Normal  General Airway Assessment: Adult, Average  Mallampati: II  TM Distance: Normal, at least 6 cm      Dental:  Dental Findings: In tact   Chest/Lungs:  Chest/Lungs Findings: Clear to auscultation, Normal Respiratory Rate     Heart/Vascular:  Heart Findings: Rate: Normal  Rhythm: Regular Rhythm        Mental Status:  Mental Status Findings:  Cooperative, Alert and Oriented         Anesthesia Plan  Type of Anesthesia, risks & benefits discussed:  Anesthesia Type:  MAC  Patient's Preference:   Intra-op Monitoring Plan: standard ASA monitors  Intra-op Monitoring Plan Comments:   Post Op Pain Control Plan:   Post Op Pain Control Plan Comments:   Induction:   IV  Beta Blocker:  Patient is on a Beta-Blocker and has received one dose within the past 24 hours (No further documentation required).       Informed Consent: Patient understands risks and agrees with Anesthesia plan.  Questions  answered. Anesthesia consent signed with patient.  ASA Score: 2     Day of Surgery Review of History & Physical: I have interviewed and examined the patient. I have reviewed the patient's H&P dated:  There are no significant changes.          Ready For Surgery From Anesthesia Perspective.

## 2019-10-14 NOTE — DISCHARGE SUMMARY
Endoscopy Discharge Summary      Admit Date: 10/14/2019    Discharge Date and Time:  10/14/2019 8:46 AM    Attending Physician: Stefanie Monique MD     Discharge Physician: Stefanie Monique MD     Principal Admitting Diagnoses: Gastroesophageal reflux disease         Discharge Diagnosis: The primary encounter diagnosis was Gastroesophageal reflux disease, esophagitis presence not specified. A diagnosis of GERD (gastroesophageal reflux disease) was also pertinent to this visit.     Discharged Condition: Good    Indication for Admission: Gastroesophageal reflux disease     Hospital Course: Patient was admitted for an inpatient procedure and tolerated the procedure well with no complications.    Significant Diagnostic Studies: EGD with biopsy    Pathology (if any):  Specimen (12h ago, onward)     Start     Ordered    10/14/19 0838  Specimen to Pathology - Surgery  Once     Comments:  #1 gastric bx r/o H. Pylori#2 gastric polyp      10/14/19 0840                Estimated Blood Loss: 0 ml.    Discussed with: patient.    Disposition: Home.    Follow Up/Patient Instructions:   Current Discharge Medication List      CONTINUE these medications which have CHANGED    Details   pantoprazole (PROTONIX) 40 MG tablet Take 1 tablet (40 mg total) by mouth 2 (two) times daily.  Qty: 60 tablet, Refills: 3    Associated Diagnoses: Gastroesophageal reflux disease, esophagitis presence not specified         CONTINUE these medications which have NOT CHANGED    Details   buPROPion (WELLBUTRIN) 75 MG tablet Take 1 tablet (75 mg total) by mouth 2 (two) times daily.  Qty: 180 tablet, Refills: 1    Associated Diagnoses: Anxiety and depression      metoprolol succinate (TOPROL-XL) 50 MG 24 hr tablet Take 1 tablet (50 mg total) by mouth once daily.  Qty: 90 tablet, Refills: 0    Associated Diagnoses: Essential hypertension      sucralfate (CARAFATE) 1 gram tablet Take 1 tablet (1 g total) by mouth 4 (four) times daily.  Qty: 120 tablet,  Refills: 0             Discharge Procedure Orders   Case request GI: EGD (ESOPHAGOGASTRODUODENOSCOPY)     Order Specific Question Answer Comments   Medical Necessity: Medically Non-Urgent [100]    CPT Code: OR UPPER GI ENDOSCOPY,DIAGNOSIS [59320]            Stefanie Monique MD  Gastroenterology and Hepatology

## 2019-10-14 NOTE — ANESTHESIA RELEASE NOTE
" Anesthesia Release from PACU Note    Patient: [facility]    Procedure(s) Performed: Procedure(s) (LRB):  ESOPHAGOGASTRODUODENOSCOPY (EGD) (N/A)    Anesthesia type: MAC    Post pain: Adequate analgesia    Post assessment: no apparent anesthetic complications, tolerated procedure well and no evidence of recall    Last Vitals:   Visit Vitals  BP (!) 142/81 (BP Location: Left arm, Patient Position: Lying)   Pulse 88   Temp 36.6 °C (97.9 °F) (Temporal)   Resp 17   Ht 5' 6" (1.676 m)   Wt (!) 146 kg (321 lb 14 oz)   LMP 10/01/2019   SpO2 99%   Breastfeeding? No   BMI 51.95 kg/m²       Post vital signs: stable    Level of consciousness: responds to stimulation    Nausea/Vomiting: no nausea/no vomiting    Complications: none    Airway Patency: patent    Respiratory: unassisted    Cardiovascular: stable and blood pressure at baseline    Hydration: euvolemic           "

## 2019-10-14 NOTE — ANESTHESIA POSTPROCEDURE EVALUATION
Anesthesia Post Evaluation    Patient: [facility]    Procedure(s) Performed: Procedure(s) (LRB):  ESOPHAGOGASTRODUODENOSCOPY (EGD) (N/A)    Final Anesthesia Type: MAC  Patient location during evaluation: PACU  Patient participation: Yes- Able to Participate  Level of consciousness: awake, awake and alert and oriented  Post-procedure vital signs: reviewed and stable  Pain management: adequate  Airway patency: patent  PONV status at discharge: No PONV  Anesthetic complications: no      Cardiovascular status: blood pressure returned to baseline  Respiratory status: spontaneous ventilation, unassisted and room air  Hydration status: euvolemic  Follow-up not needed.          Vitals Value Taken Time   /81 10/14/2019  7:59 AM   Temp 36.6 °C (97.9 °F) 10/14/2019  7:59 AM   Pulse 88 10/14/2019  7:59 AM   Resp 17 10/14/2019  7:59 AM   SpO2 99 % 10/14/2019  7:59 AM         No case tracking events are documented in the log.      Pain/Barrett Score: No data recorded

## 2019-10-14 NOTE — TRANSFER OF CARE
"Anesthesia Transfer of Care Note    Patient: [facility]    Procedure(s) Performed: Procedure(s) (LRB):  ESOPHAGOGASTRODUODENOSCOPY (EGD) (N/A)    Patient location: PACU    Anesthesia Type: MAC    Transport from OR: Transported from OR on room air with adequate spontaneous ventilation    Post pain: adequate analgesia    Post assessment: no apparent anesthetic complications    Post vital signs: stable    Level of consciousness: responds to stimulation    Nausea/Vomiting: no nausea/vomiting    Complications: none    Transfer of care protocol was followed      Last vitals:   Visit Vitals  BP (!) 142/81 (BP Location: Left arm, Patient Position: Lying)   Pulse 88   Temp 36.6 °C (97.9 °F) (Temporal)   Resp 17   Ht 5' 6" (1.676 m)   Wt (!) 146 kg (321 lb 14 oz)   LMP 10/01/2019   SpO2 99%   Breastfeeding? No   BMI 51.95 kg/m²     "

## 2019-10-14 NOTE — [employer] PATIENT INSTRUCTIONS
Discharge Summary/Instructions after an Endoscopic Procedure  Patient Name: Mary Russo  Patient MRN: [id number]  Patient YOB: 1970 Monday, October 14, 2019 Stefanie Monique MD  RESTRICTIONS:  During your procedure today, you received medications for sedation.  These   medications may affect your judgment, balance and coordination.  Therefore,   for 24 hours, you have the following restrictions:   - DO NOT drive a car, operate machinery, make legal/financial decisions,   sign important papers or drink alcohol.    ACTIVITY:  Today: no heavy lifting, straining or running due to procedural   sedation/anesthesia.  The following day: return to full activity including work.  DIET:  Eat and drink normally unless instructed otherwise.     TREATMENT FOR COMMON SIDE EFFECTS:  - Mild abdominal pain, nausea, belching, bloating or excessive gas:  rest,   eat lightly and use a heating pad.  - Sore Throat: treat with throat lozenges and/or gargle with warm salt   water.  - Because air was used during the procedure, expelling large amounts of air   from your rectum or belching is normal.  - If a bowel prep was taken, you may not have a bowel movement for 1-3 days.    This is normal.  SYMPTOMS TO WATCH FOR AND REPORT TO YOUR PHYSICIAN:  1. Abdominal pain or bloating, other than gas cramps.  2. Chest pain.  3. Back pain.  4. Signs of infection such as: chills or fever occurring within 24 hours   after the procedure.  5. Rectal bleeding, which would show as bright red, maroon, or black stools.   (A tablespoon of blood from the rectum is not serious, especially if   hemorrhoids are present.)  6. Vomiting.  7. Weakness or dizziness.  GO DIRECTLY TO THE NEAREST EMERGENCY ROOM IF YOU HAVE ANY OF THE FOLLOWING:      Difficulty breathing              Chills and/or fever over 101 F   Persistent vomiting and/or vomiting blood   Severe abdominal pain   Severe chest pain   Black, tarry stools   Bleeding- more than one  tablespoon   Any other symptom or condition that you feel may need urgent attention  Your doctor recommends these additional instructions:  If any biopsies were taken, your doctors clinic will contact you in 1 to 2   weeks with any results.  - Await pathology results.   - Discharge patient to home (via wheelchair).   - Resume previous diet.   - Continue present medications.   - Patient has a contact number available for emergencies.  The signs and   symptoms of potential delayed complications were discussed with the   patient.  Return to normal activities tomorrow.  Written discharge   instructions were provided to the patient.  For questions, problems or results please call your physician Stefanie Monique MD at Work:  [phone]  If you have any questions about the above instructions, call the GI   department at [phone] or call the endoscopy unit at [phone]   from 7am until 3 pm.  [facility], EMERGENCY ROOM PHONE NUMBER:   [phone]  IF A COMPLICATION OR EMERGENCY SITUATION ARISES AND YOU ARE UNABLE TO REACH   YOUR PHYSICIAN - GO DIRECTLY TO THE EMERGENCY ROOM.  I have read or have had read to me these discharge instructions for my   procedure and have received a written copy.  I understand these   instructions and will follow-up with my physician if I have any questions.     __________________________________       _____________________________________  Nurse Signature                                          Patient/Designated   Responsible Party Signature  MD Stefanie Perez MD  10/14/2019 8:44:24 AM  [employer]

## 2019-10-14 NOTE — DISCHARGE INSTRUCTIONS
What Is a Hiatal Hernia?    Hiatal hernia is when the area where the stomach and esophagus meet bulges up through the diaphragm into the chest cavity. In some cases, part of the stomach may bulge above the diaphragm. Stomach acid may move up into the esophagus and cause symptoms. The symptoms are often blamed on gastroesophageal reflux disease (GERD). You may only know about the hernia when it shows up on an X-ray taken for other reasons.   What you may feel  The hiatus is a normal hole in the diaphragm. The esophagus passes through this hole and leads to the stomach. In some cases, part of the stomach may bulge above the diaphragm. This bulge is called a hernia. Stomach acid may move up into the esophagus and cause symptoms.  When you eat, the muscle at the hiatus relaxes to allow food to pass into the stomach. It tightens again to keep food and digestive acids in the stomach.  Many people with hiatal hernias have mild symptoms. You may notice the following GERD symptoms:  · Heartburn or other chest discomfort  · A feeling of chest fullness after a meal  · Frequent burping  · Acid taste in the mouth  · Trouble swallowing  Treating symptoms  If you have been diagnosed with hiatal hernia, these suggestions may help improve symptoms:  · Lose excess weight. Extra weight puts pressure on the stomach and esophagus.  · Dont lie down after eating. Sit up for at least an hour after eating. Lying down after eating can increase symptoms.  · Avoid certain foods and drinks. These include fatty foods, chocolate, coffee, mint, and other foods that cause symptoms for you.  · Dont smoke or drink alcohol. These can worsen symptoms.  · Look at your medicines. Discuss your medicines with your healthcare provider. Many medicines can cause symptoms.  · Consider an antacid medicine. Ask your healthcare provider about over-the-counter and prescription medicines that may help.  · Ask about surgery, if needed. Surgery is a treatment  choice for some people. Your healthcare provider can determine if surgery is an option for you.    Date Last Reviewed: 10/1/2016  © 5651-4786 [employer]. [address] [place] [address], [place] [address]. All rights reserved. This information is not intended as a substitute for professional medical care. Always follow your healthcare professional's instructions.        Gastritis (Adult)    Gastritis is inflammation and irritation of the stomach lining. It can be present for a short time (acute) or be long lasting (chronic). Gastritis is often caused by infection with bacteria called H pylori. More than a third of people in the [place] have this bacteria in their bodies. In many cases, H pylori causes no problems or symptoms. In some people, though, the infection irritates the stomach lining and causes gastritis. Other causes of stomach irritation include drinking alcohol or taking pain-relieving medicines called NSAIDs (such as aspirin or ibuprofen).   Symptoms of gastritis can include:  · Abdominal pain or bloating  · Loss of appetite  · Nausea or vomiting  · Vomiting blood or having black stools  · Feeling more tired than usual  An inflamed and irritated stomach lining is more likely to develop a sore called an ulcer. To help prevent this, gastritis should be treated.  Home care  If needed, medicines may be prescribed. If you have H pylori infection, treating it will likely relieve your symptoms. Other changes can help reduce stomach irritation and help it heal.  · If you have been prescribed medicines for H pylori infection, take them as directed. Take all of the medicine until it is finished or your healthcare provider tells you to stop, even if you feel better.  · Your healthcare provider may recommend avoiding NSAIDs. If you take daily aspirin for your heart or other medical reasons, do not stop without talking to your healthcare provider first.  · Avoid drinking alcohol.  · Stop smoking. Smoking can  irritate the stomach and delay healing. As much as possible, stay away from second hand smoke.  Follow-up care  Follow up with your healthcare provider, or as advised by our staff. Testing may be needed to check for inflammation or an ulcer.  When to seek medical advice  Call your healthcare provider for any of the following:  · Stomach pain that gets worse or moves to the lower right abdomen (appendix area)  · Chest pain that appears or gets worse, or spreads to the back, neck, shoulder, or arm  · Frequent vomiting (cant keep down liquids)  · Blood in the stool or vomit (red or black in color)  · Feeling weak or dizzy  · Fever of 100.4ºF (38ºC) or higher, or as directed by your healthcare provider  Date Last Reviewed: 6/22/2015 © 2000-2017 [employer]. [address] [place] [address], [place] [address]. All rights reserved. This information is not intended as a substitute for professional medical care. Always follow your healthcare professional's instructions.        Esophagitis     With esophagitis, the lining of the esophagus is inflamed.   Do you often have burning pain in your chest? You may have esophagitis. This is when the lining of the esophagus becomes red and swollen (inflamed). The esophagus is the tube that connects your throat to your stomach. This sheet tells you more about esophagitis. It also explains your treatment options.  Main types of esophagitis  Reflux esophagitis. This is the more common type. It is caused by GERD (gastroesophageal reflux disease). Stomach contents with stomach acid flow back up into the esophagus. This happens over and over. It leads to inflammation. Risk factors can include:  · Being overweight  · Asthma  · Smoking  · Pregnancy  · Frequent vomiting  · Certain medicines (such as aspirin and other anti-inflammatories)  · Hiatal hernia  Infectious esophagitis. This is caused by an infection. You are more at risk for this if you have a weakened immune system and poor  nutrition. Antibiotic use can also be a factor. The infection is often due to the following:  · A type of fungus (typically candida)  · A virus, such as herpes simplex virus 1 (HSV-1) or cytomegalovirus (CMV)  Eosinophilic esophagitis. Foods or other things around you can give you an allergic reaction. This triggers an immune response and leads to esophagitis.  Pill-induced esophagitis. Certain types of medicines can cause inflammation and ulcers in the esophagus. These include doxycycline, aspirin, NSAIDs, alendronate, potassium, quinidine, iron.  Symptoms of esophagitis  The following symptoms can occur with esophagitis:  · Pain when swallowing, or trouble swallowing  · Pain behind your breastbone (heartburn)  · Acid regurgitation  · Chronic sore throat  · Gum Inflammation  · Cavities  · Bad breath  · Nausea  · Pain in your upper belly (abdomen)  · Bleeding (indicated by bright red vomit or black, tarry stool)  These symptoms occur more often with reflux esophagitis:  · Coughing, wheezing, or asthma  · Hoarseness  Diagnosis of esophagitis  Your healthcare provider will ask about your health history and symptoms. Youll also be examined. Sometimes certain tests are needed. These may include:  · Upper endoscopy. A thin, flexible tube with a tiny light and camera is used. It is inserted through the mouth down into the esophagus. This lets the provider look for damage. A small sample of tissue (biopsy) may also be removed. The sample is sent to a lab for testing.  · Upper GI X-ray with barium. An X-ray is done after you drink a substance called barium. Barium may make problems in the esophagus easier to see on an x-ray.  · Esophageal pH. A soft, thin tube is passed into the esophagus through the nose or mouth for 24 hours. It measures the acid level in the esophagus.  · Esophageal manometry. A soft, thin tube is passed into the esophagus through the nose or mouth. It measures muscle contractions in the  esophagus.  Treatment of esophagitis  Medicines. Different medicines can help treat esophagitis. The medicine used will depend on the type of esophagitis you have. Talk with your healthcare provider.  Lifestyle changes. Making the following changes can help reduce irritation and ease your symptoms:  · Avoid spicy foods (pepper, chili powder, smith). Also avoid hard foods (nuts, crackers, raw vegetables) and acidic foods and drinks (tomatoes, citrus fruits and juices). Other problem foods include chocolate, peppermint, nutmeg, and foods high in fat.  · Until you can swallow without pain, follow a combined liquid and soft diet. Try foods such as cooked cereals, mashed potatoes, and soups.  · Take small bites and chew your food thoroughly.  · Avoid large meals and heavy evening meals. Don't lie down within 2 to 3 hours of eating.  · Get to or stay at a healthy weight.  · Avoid alcohol, caffeine, and smoking or tobacco products.  · Brush and floss your teeth  · Raise your upper body by 4 to 6 inches when lying in bed. This can be done using a foam wedge. Or put blocks under the legs at the head of your bed.  Surgery. This may be needed for severe reflux esophagitis. Other noninvasive procedures to treat GERD and esophagitis are being studied. Your provider can tell you more.  Why treatment Is important  Without treatment, esophagitis can get worse. This is especially true with severe reflux esophagitis. For instance, continued symptoms can cause scarring of the esophagus. Over time, this can cause a narrowing the esophagus (stricture). This can make it hard to pass food down to the stomach. As symptoms go on they can also cause changes in the lining of the esophagus. These changes can put you at a slightly higher risk of cancer of the esophagus.   Date Last Reviewed: 7/1/2016  © 4809-0116 [employer]. [address] [place] [address], [place] [address]. All rights reserved. This information is not intended as a  substitute for professional medical care. Always follow your healthcare professional's instructions.

## 2019-10-15 VITALS
HEIGHT: 66 IN | HEART RATE: 73 BPM | SYSTOLIC BLOOD PRESSURE: 133 MMHG | TEMPERATURE: 98 F | OXYGEN SATURATION: 100 % | BODY MASS INDEX: 47.09 KG/M2 | RESPIRATION RATE: 18 BRPM | WEIGHT: 293 LBS | DIASTOLIC BLOOD PRESSURE: 80 MMHG

## 2019-10-18 ENCOUNTER — PATIENT MESSAGE (OUTPATIENT)
Dept: GASTROENTEROLOGY | Facility: CLINIC | Age: 49
End: 2019-10-18

## 2019-10-25 ENCOUNTER — OFFICE VISIT (OUTPATIENT)
Dept: GASTROENTEROLOGY | Facility: CLINIC | Age: 49
End: 2019-10-25
Payer: COMMERCIAL

## 2019-10-25 ENCOUNTER — OFFICE VISIT (OUTPATIENT)
Dept: OBSTETRICS AND GYNECOLOGY | Facility: CLINIC | Age: 49
End: 2019-10-25
Payer: COMMERCIAL

## 2019-10-25 VITALS
BODY MASS INDEX: 47.09 KG/M2 | WEIGHT: 293 LBS | SYSTOLIC BLOOD PRESSURE: 118 MMHG | HEIGHT: 66 IN | DIASTOLIC BLOOD PRESSURE: 86 MMHG

## 2019-10-25 VITALS
BODY MASS INDEX: 47.09 KG/M2 | DIASTOLIC BLOOD PRESSURE: 88 MMHG | HEART RATE: 88 BPM | WEIGHT: 293 LBS | SYSTOLIC BLOOD PRESSURE: 130 MMHG | HEIGHT: 66 IN | OXYGEN SATURATION: 98 %

## 2019-10-25 DIAGNOSIS — Z01.419 WELL WOMAN EXAM WITH ROUTINE GYNECOLOGICAL EXAM: Primary | ICD-10-CM

## 2019-10-25 DIAGNOSIS — K44.9 HIATAL HERNIA: ICD-10-CM

## 2019-10-25 DIAGNOSIS — K21.00 GASTROESOPHAGEAL REFLUX DISEASE WITH ESOPHAGITIS: Primary | ICD-10-CM

## 2019-10-25 DIAGNOSIS — Z12.4 CERVICAL CANCER SCREENING: ICD-10-CM

## 2019-10-25 PROCEDURE — 99999 PR PBB SHADOW E&M-EST. PATIENT-LVL IV: ICD-10-PCS | Mod: PBBFAC,,, | Performed by: PHYSICIAN ASSISTANT

## 2019-10-25 PROCEDURE — 88142 CYTOPATH C/V THIN LAYER: CPT

## 2019-10-25 PROCEDURE — 87624 HPV HI-RISK TYP POOLED RSLT: CPT

## 2019-10-25 PROCEDURE — 99999 PR PBB SHADOW E&M-EST. PATIENT-LVL III: CPT | Mod: PBBFAC,,, | Performed by: OBSTETRICS & GYNECOLOGY

## 2019-10-25 PROCEDURE — 99213 OFFICE O/P EST LOW 20 MIN: CPT | Mod: S$GLB,,, | Performed by: PHYSICIAN ASSISTANT

## 2019-10-25 PROCEDURE — 3075F SYST BP GE 130 - 139MM HG: CPT | Mod: CPTII,S$GLB,, | Performed by: PHYSICIAN ASSISTANT

## 2019-10-25 PROCEDURE — 99386 PREV VISIT NEW AGE 40-64: CPT | Mod: S$GLB,,, | Performed by: OBSTETRICS & GYNECOLOGY

## 2019-10-25 PROCEDURE — 3079F DIAST BP 80-89 MM HG: CPT | Mod: CPTII,S$GLB,, | Performed by: OBSTETRICS & GYNECOLOGY

## 2019-10-25 PROCEDURE — 3008F PR BODY MASS INDEX (BMI) DOCUMENTED: ICD-10-PCS | Mod: CPTII,S$GLB,, | Performed by: PHYSICIAN ASSISTANT

## 2019-10-25 PROCEDURE — 3079F PR MOST RECENT DIASTOLIC BLOOD PRESSURE 80-89 MM HG: ICD-10-PCS | Mod: CPTII,S$GLB,, | Performed by: OBSTETRICS & GYNECOLOGY

## 2019-10-25 PROCEDURE — 3079F DIAST BP 80-89 MM HG: CPT | Mod: CPTII,S$GLB,, | Performed by: PHYSICIAN ASSISTANT

## 2019-10-25 PROCEDURE — 3074F PR MOST RECENT SYSTOLIC BLOOD PRESSURE < 130 MM HG: ICD-10-PCS | Mod: CPTII,S$GLB,, | Performed by: OBSTETRICS & GYNECOLOGY

## 2019-10-25 PROCEDURE — 3079F PR MOST RECENT DIASTOLIC BLOOD PRESSURE 80-89 MM HG: ICD-10-PCS | Mod: CPTII,S$GLB,, | Performed by: PHYSICIAN ASSISTANT

## 2019-10-25 PROCEDURE — 99999 PR PBB SHADOW E&M-EST. PATIENT-LVL IV: CPT | Mod: PBBFAC,,, | Performed by: PHYSICIAN ASSISTANT

## 2019-10-25 PROCEDURE — 99213 PR OFFICE/OUTPT VISIT, EST, LEVL III, 20-29 MIN: ICD-10-PCS | Mod: S$GLB,,, | Performed by: PHYSICIAN ASSISTANT

## 2019-10-25 PROCEDURE — 3074F SYST BP LT 130 MM HG: CPT | Mod: CPTII,S$GLB,, | Performed by: OBSTETRICS & GYNECOLOGY

## 2019-10-25 PROCEDURE — 3008F BODY MASS INDEX DOCD: CPT | Mod: CPTII,S$GLB,, | Performed by: PHYSICIAN ASSISTANT

## 2019-10-25 PROCEDURE — 3075F PR MOST RECENT SYSTOLIC BLOOD PRESS GE 130-139MM HG: ICD-10-PCS | Mod: CPTII,S$GLB,, | Performed by: PHYSICIAN ASSISTANT

## 2019-10-25 PROCEDURE — 99386 PR PREVENTIVE VISIT,NEW,40-64: ICD-10-PCS | Mod: S$GLB,,, | Performed by: OBSTETRICS & GYNECOLOGY

## 2019-10-25 PROCEDURE — 99999 PR PBB SHADOW E&M-EST. PATIENT-LVL III: ICD-10-PCS | Mod: PBBFAC,,, | Performed by: OBSTETRICS & GYNECOLOGY

## 2019-10-25 NOTE — PROGRESS NOTES
Subjective:       Patient ID: Mary Russo is a 49 y.o. female.    Chief Complaint:  Well Woman      History of Present Illness  HPI  Presents for well-woman exam.  No gyn complaints.  Has regular, monthly periods.  No heavy or prolonged bleeding.  Mutually monogamous with her spouse.  She has had a BTL.     Last pap: 2016: normal  MMG: scheduled for 21; pt's mother had breast cancer in her 40's.  Pt denies any breast complaints    GYN & OB History  Patient's last menstrual period was 10/01/2019.   Date of Last Pap: 2016    OB History    Para Term  AB Living   5 3 3   2 3   SAB TAB Ectopic Multiple Live Births                  # Outcome Date GA Lbr Paul/2nd Weight Sex Delivery Anes PTL Lv   5 AB            4 AB            3 Term            2 Term            1 Term                Review of Systems  Review of Systems   Constitutional: Negative for activity change, fatigue, fever and unexpected weight change.   Gastrointestinal: Negative for abdominal pain, bloating, constipation, diarrhea, nausea and vomiting.   Endocrine: Negative for hair loss and hot flashes.   Genitourinary: Negative for dysmenorrhea, dyspareunia, dysuria, frequency, genital sores, hematuria, menorrhagia, menstrual problem, pelvic pain, urgency, vaginal bleeding, vaginal discharge, vaginal pain, postcoital bleeding and vaginal odor.   Integumentary:  Negative for rash, hair changes, breast mass, nipple discharge and breast skin changes.   Hematological: Negative for adenopathy.   Breast: Negative for mass, mastodynia, nipple discharge and skin changes          Objective:    Physical Exam:   Constitutional: She is oriented to person, place, and time. She appears well-developed and well-nourished. No distress.      Neck: Neck supple. No thyromegaly present.     Pulmonary/Chest: Right breast exhibits no inverted nipple, no mass, no nipple discharge, no skin change, no tenderness, no bleeding and no swelling. Left  breast exhibits no inverted nipple, no mass, no nipple discharge, no skin change, no tenderness, no bleeding and no swelling. Breasts are symmetrical.            Abdominal: Soft. She exhibits no distension and no mass. There is no tenderness. There is no rebound and no guarding.     Genitourinary: Pelvic exam was performed with patient supine. There is no rash, tenderness, lesion or injury on the right labia. There is no rash, tenderness, lesion or injury on the left labia. Uterus is not deviated, not enlarged, not fixed, not tender, not hosting fibroids and not experiencing uterine prolapse. Cervix is normal. Right adnexum displays no mass, no tenderness and no fullness. Left adnexum displays no mass, no tenderness and no fullness. No erythema, tenderness, bleeding, rectocele or cystocele in the vagina. No foreign body in the vagina. No signs of injury around the vagina. No vaginal discharge found. Cervix exhibits no motion tenderness, no discharge and no friability.        Uterus Size: 6 cm      Lymphadenopathy:        Right: No inguinal adenopathy present.        Left: No inguinal adenopathy present.    Neurological: She is alert and oriented to person, place, and time.     Psychiatric: She has a normal mood and affect.          Assessment:        1. Well woman exam with routine gynecological exam    2. Cervical cancer screening                Plan:      Mary was seen today for well woman.    Diagnoses and all orders for this visit:    Well woman exam with routine gynecological exam    Cervical cancer screening  -     Liquid-based pap smear, screening  -     HPV High Risk Genotypes, PCR    Reviewed updated recommendations for pap smears (every 3 years) in low risk patients.   Recommend annual pelvic exams.  Reviewed recommendations for annual CBE and annual MMG.  Keep MMG appt as scheduled 11/29/19.  RTC 1 year or sooner prn.

## 2019-10-25 NOTE — PATIENT INSTRUCTIONS
Breast Health: Breast Self-Awareness  What is breast self-awareness?  Breast self-awareness is knowing how your breasts normally look and feel. Your breasts change as you go through different stages of your life. So its important to learn what is normal for your breasts. Breast self-awareness helps you notice any changes in your breasts right away. Report any changes to your healthcare provider.  Why is breast self-awareness important?  Many experts now say that women should focus on breast self-awareness instead of doing a breast self-examination (BSE). These experts include the American Cancer Society, the [place].S. Preventive Services Task Force, and the American Congress of Obstetricians and Gynecologists. Some experts even advise not teaching women to do a BSE. Thats because research hasnt shown a clear benefit to doing BSEs.  Breast self-awareness is different than a BSE. Breast self-awareness isnt about following a certain method and schedule. Its about knowing what's normal for your breasts. That way you can notice even small changes right away. If you see any changes, report them to your healthcare provider.  Changes to look for  Call your healthcare provider if you find any changes in your breasts that concern you. These changes may include:  · A lump  · Nipple discharge other than breast milk, especially a bloody discharge  · Swelling  · A change in size or shape  · Skin irritation, such as redness, thickening, or dimpling of the skin  · Swollen lymph nodes in the armpit  · Nipple problems, such as pain or redness  If you find a lump  Contact your provider if you find lumpiness in one breast, feel something different in the tissue, or feel a definite lump. Sometimes lumpiness may be due to menstrual changes. But there may be reason for concern.  Your provider may want to see you right away if you have:  · Nipple discharge that is bloody  · Skin changes on your breast, such as dimpling or puckering  Its  normal to be upset if you find a lump. But its important to contact your provider right away. Remember that most breast lumps are benign. This means they are not cancer.  Date Last Reviewed: 8/10/2015  © 6440-3727 [employer]. [address] [place] [address], [place] [address]. All rights reserved. This information is not intended as a substitute for professional medical care. Always follow your healthcare professional's instructions.

## 2019-10-25 NOTE — PROGRESS NOTES
Subjective:      Patient ID: Mary Russo is a 49 y.o. female.    Chief Complaint: Gastroesophageal Reflux (4 wk f/u)    HPI  The patient has a history of GERD and LUQ pain. She is here today for a follow up visit. Last visit she reported worsening heartburn. Recommendations last visit included EGD, Protonix bid and Carafate. The EGD showed grade C reflux esophagitis, 5 cm hiatal hernia, gastritis and gastric polyps. Biopsies were normal and negative for H. pylori. An 8 week repeat was recommended. It is scheduled for 12/23/19. Today she reports improvement in her symptoms. She has only taken additional OTC meds once since last visit. She denies vomiting and nights have improved.     Review of Systems  As per HPI.     Objective:     Physical Exam   Constitutional: She is oriented to person, place, and time. She appears well-developed and well-nourished. No distress.   HENT:   Head: Normocephalic and atraumatic.   Eyes: EOM are normal.   Cardiovascular: Normal rate and regular rhythm.   Pulmonary/Chest: Effort normal and breath sounds normal. No respiratory distress. She has no wheezes.   Abdominal: Soft. Bowel sounds are normal. She exhibits no distension. There is no tenderness.   Neurological: She is alert and oriented to person, place, and time. No cranial nerve deficit.   Skin: She is not diaphoretic.   Psychiatric: Her behavior is normal.       Assessment:     1. Gastroesophageal reflux disease with esophagitis    2. Hiatal hernia        Plan:     EGD as scheduled in December.   Continue Carafate and Protonix.   Will likely need to see Gen surg about hernia repair.   She wants to hold off on screening colonoscopy for now. May consider FIT kit.       Follow up if symptoms worsen or fail to improve.    Thank you for the opportunity to participate in the care of this patient.   Anurag Tay PA-C.

## 2019-10-27 DIAGNOSIS — M17.0 ARTHRITIS OF BOTH KNEES: ICD-10-CM

## 2019-10-28 RX ORDER — MELOXICAM 15 MG/1
TABLET ORAL
Qty: 30 TABLET | Refills: 6 | OUTPATIENT
Start: 2019-10-28

## 2019-10-31 LAB
HPV HR 12 DNA CVX QL NAA+PROBE: NEGATIVE
HPV16 AG SPEC QL: NEGATIVE
HPV18 DNA SPEC QL NAA+PROBE: NEGATIVE

## 2019-11-29 ENCOUNTER — HOSPITAL ENCOUNTER (OUTPATIENT)
Dept: RADIOLOGY | Facility: HOSPITAL | Age: 49
Discharge: HOME | End: 2019-11-29
Attending: FAMILY MEDICINE
Payer: COMMERCIAL

## 2019-11-29 VITALS — BODY MASS INDEX: 47.09 KG/M2 | WEIGHT: 293 LBS | HEIGHT: 66 IN

## 2019-11-29 DIAGNOSIS — Z12.31 ENCOUNTER FOR SCREENING MAMMOGRAM FOR BREAST CANCER: ICD-10-CM

## 2019-11-29 PROCEDURE — 77067 SCR MAMMO BI INCL CAD: CPT | Mod: 26,,, | Performed by: RADIOLOGY

## 2019-11-29 PROCEDURE — 77067 MAMMO DIGITAL SCREENING BILAT WITH TOMOSYNTHESIS_CAD: ICD-10-PCS | Mod: 26,,, | Performed by: RADIOLOGY

## 2019-11-29 PROCEDURE — 77067 SCR MAMMO BI INCL CAD: CPT | Mod: TC

## 2019-11-29 PROCEDURE — 77063 BREAST TOMOSYNTHESIS BI: CPT | Mod: 26,,, | Performed by: RADIOLOGY

## 2019-11-29 PROCEDURE — 77063 MAMMO DIGITAL SCREENING BILAT WITH TOMOSYNTHESIS_CAD: ICD-10-PCS | Mod: 26,,, | Performed by: RADIOLOGY

## 2019-12-05 DIAGNOSIS — K21.9 GASTROESOPHAGEAL REFLUX DISEASE, ESOPHAGITIS PRESENCE NOT SPECIFIED: ICD-10-CM

## 2019-12-05 RX ORDER — PANTOPRAZOLE SODIUM 40 MG/1
TABLET, DELAYED RELEASE ORAL
Qty: 30 TABLET | Refills: 5 | Status: SHIPPED | OUTPATIENT
Start: 2019-12-05 | End: 2020-04-08

## 2019-12-22 PROBLEM — K20.80 ESOPHAGITIS, LOS ANGELES GRADE C: Status: ACTIVE | Noted: 2019-12-22

## 2019-12-23 ENCOUNTER — HOSPITAL ENCOUNTER (OUTPATIENT)
Facility: HOSPITAL | Age: 49
Discharge: HOME | End: 2019-12-23
Attending: INTERNAL MEDICINE | Admitting: INTERNAL MEDICINE
Payer: COMMERCIAL

## 2019-12-23 ENCOUNTER — ANESTHESIA (OUTPATIENT)
Dept: ENDOSCOPY | Facility: HOSPITAL | Age: 49
End: 2019-12-23
Payer: COMMERCIAL

## 2019-12-23 ENCOUNTER — ANESTHESIA EVENT (OUTPATIENT)
Dept: ENDOSCOPY | Facility: HOSPITAL | Age: 49
End: 2019-12-23
Payer: COMMERCIAL

## 2019-12-23 VITALS
BODY MASS INDEX: 47.09 KG/M2 | SYSTOLIC BLOOD PRESSURE: 131 MMHG | WEIGHT: 293 LBS | HEIGHT: 66 IN | OXYGEN SATURATION: 100 % | DIASTOLIC BLOOD PRESSURE: 78 MMHG | TEMPERATURE: 98 F | RESPIRATION RATE: 18 BRPM | HEART RATE: 78 BPM

## 2019-12-23 DIAGNOSIS — K20.80 ESOPHAGITIS, LOS ANGELES GRADE C: Primary | ICD-10-CM

## 2019-12-23 LAB
B-HCG UR QL: NEGATIVE
CTP QC/QA: YES

## 2019-12-23 PROCEDURE — 43235 EGD DIAGNOSTIC BRUSH WASH: CPT | Mod: ,,, | Performed by: INTERNAL MEDICINE

## 2019-12-23 PROCEDURE — 63600175 PHARM REV CODE 636 W HCPCS: Performed by: INTERNAL MEDICINE

## 2019-12-23 PROCEDURE — 63600175 PHARM REV CODE 636 W HCPCS: Performed by: NURSE ANESTHETIST, CERTIFIED REGISTERED

## 2019-12-23 PROCEDURE — 43235 EGD DIAGNOSTIC BRUSH WASH: CPT | Performed by: INTERNAL MEDICINE

## 2019-12-23 PROCEDURE — 43235 PR EGD, FLEX, DIAGNOSTIC: ICD-10-PCS | Mod: ,,, | Performed by: INTERNAL MEDICINE

## 2019-12-23 PROCEDURE — 81025 URINE PREGNANCY TEST: CPT | Performed by: INTERNAL MEDICINE

## 2019-12-23 PROCEDURE — 37000008 HC ANESTHESIA 1ST 15 MINUTES: Performed by: INTERNAL MEDICINE

## 2019-12-23 RX ORDER — SODIUM CHLORIDE, SODIUM LACTATE, POTASSIUM CHLORIDE, CALCIUM CHLORIDE 600; 310; 30; 20 MG/100ML; MG/100ML; MG/100ML; MG/100ML
INJECTION, SOLUTION INTRAVENOUS CONTINUOUS
Status: DISCONTINUED | OUTPATIENT
Start: 2019-12-23 | End: 2019-12-23 | Stop reason: HOSPADM

## 2019-12-23 RX ORDER — PROPOFOL 10 MG/ML
VIAL (ML) INTRAVENOUS
Status: DISCONTINUED | OUTPATIENT
Start: 2019-12-23 | End: 2019-12-23

## 2019-12-23 RX ADMIN — SODIUM CHLORIDE, SODIUM LACTATE, POTASSIUM CHLORIDE, AND CALCIUM CHLORIDE: 600; 310; 30; 20 INJECTION, SOLUTION INTRAVENOUS at 07:12

## 2019-12-23 RX ADMIN — PROPOFOL 50 MG: 10 INJECTION, EMULSION INTRAVENOUS at 07:12

## 2019-12-23 NOTE — TRANSFER OF CARE
"Anesthesia Transfer of Care Note    Patient: [facility]    Procedure(s) Performed: Procedure(s) (LRB):  EGD (ESOPHAGOGASTRODUODENOSCOPY) (N/A)    Patient location: PACU    Anesthesia Type: MAC    Transport from OR: Transported from OR on room air with adequate spontaneous ventilation    Post pain: adequate analgesia    Post assessment: tolerated procedure well and no apparent anesthetic complications    Post vital signs: stable    Level of consciousness: sedated    Nausea/Vomiting: no nausea/vomiting    Complications: none    Transfer of care protocol was followed      Last vitals:   Visit Vitals  /78   Pulse 92   Temp 36.6 °C (97.9 °F)   Resp 16   Ht 5' 6" (1.676 m)   Wt (!) 144.2 kg (317 lb 14.5 oz)   LMP 11/25/2019   SpO2 99%   Breastfeeding? No   BMI 51.31 kg/m²     "

## 2019-12-23 NOTE — [employer] PATIENT INSTRUCTIONS
Discharge Summary/Instructions after an Endoscopic Procedure  Patient Name: Mary Russo  Patient MRN: [id number]  Patient YOB: 1970 Monday, December 23, 2019 Stefanie Monique MD  RESTRICTIONS:  During your procedure today, you received medications for sedation.  These   medications may affect your judgment, balance and coordination.  Therefore,   for 24 hours, you have the following restrictions:   - DO NOT drive a car, operate machinery, make legal/financial decisions,   sign important papers or drink alcohol.    ACTIVITY:  Today: no heavy lifting, straining or running due to procedural   sedation/anesthesia.  The following day: return to full activity including work.  DIET:  Eat and drink normally unless instructed otherwise.     TREATMENT FOR COMMON SIDE EFFECTS:  - Mild abdominal pain, nausea, belching, bloating or excessive gas:  rest,   eat lightly and use a heating pad.  - Sore Throat: treat with throat lozenges and/or gargle with warm salt   water.  - Because air was used during the procedure, expelling large amounts of air   from your rectum or belching is normal.  - If a bowel prep was taken, you may not have a bowel movement for 1-3 days.    This is normal.  SYMPTOMS TO WATCH FOR AND REPORT TO YOUR PHYSICIAN:  1. Abdominal pain or bloating, other than gas cramps.  2. Chest pain.  3. Back pain.  4. Signs of infection such as: chills or fever occurring within 24 hours   after the procedure.  5. Rectal bleeding, which would show as bright red, maroon, or black stools.   (A tablespoon of blood from the rectum is not serious, especially if   hemorrhoids are present.)  6. Vomiting.  7. Weakness or dizziness.  GO DIRECTLY TO THE NEAREST EMERGENCY ROOM IF YOU HAVE ANY OF THE FOLLOWING:      Difficulty breathing              Chills and/or fever over 101 F   Persistent vomiting and/or vomiting blood   Severe abdominal pain   Severe chest pain   Black, tarry stools   Bleeding- more than one  tablespoon   Any other symptom or condition that you feel may need urgent attention  Your doctor recommends these additional instructions:  If any biopsies were taken, your doctors clinic will contact you in 1 to 2   weeks with any results.  - Discharge patient to home (via wheelchair).   - Resume previous diet.   - Continue present medications.   - Patient has a contact number available for emergencies.  The signs and   symptoms of potential delayed complications were discussed with the   patient.  Return to normal activities tomorrow.  Written discharge   instructions were provided to the patient.  For questions, problems or results please call your physician Stefanie Monique MD at Work:  [phone]  If you have any questions about the above instructions, call the GI   department at [phone] or call the endoscopy unit at [phone]   from 7am until 3 pm.  [facility] [place], EMERGENCY ROOM PHONE NUMBER:   [phone]  IF A COMPLICATION OR EMERGENCY SITUATION ARISES AND YOU ARE UNABLE TO REACH   YOUR PHYSICIAN - GO DIRECTLY TO THE EMERGENCY ROOM.  I have read or have had read to me these discharge instructions for my   procedure and have received a written copy.  I understand these   instructions and will follow-up with my physician if I have any questions.     __________________________________       _____________________________________  Nurse Signature                                          Patient/Designated   Responsible Party Signature  MD Stefanie Perez MD  12/23/2019 7:29:13 AM  [employer]

## 2019-12-23 NOTE — DISCHARGE INSTRUCTIONS
What Is a Hiatal Hernia?    Hiatal hernia is when the area where the stomach and esophagus meet bulges up through the diaphragm into the chest cavity. In some cases, part of the stomach may bulge above the diaphragm. Stomach acid may move up into the esophagus and cause symptoms. The symptoms are often blamed on gastroesophageal reflux disease (GERD). You may only know about the hernia when it shows up on an X-ray taken for other reasons.   What you may feel  The hiatus is a normal hole in the diaphragm. The esophagus passes through this hole and leads to the stomach. In some cases, part of the stomach may bulge above the diaphragm. This bulge is called a hernia. Stomach acid may move up into the esophagus and cause symptoms.  When you eat, the muscle at the hiatus relaxes to allow food to pass into the stomach. It tightens again to keep food and digestive acids in the stomach.  Many people with hiatal hernias have mild symptoms. You may notice the following GERD symptoms:  · Heartburn or other chest discomfort  · A feeling of chest fullness after a meal  · Frequent burping  · Acid taste in the mouth  · Trouble swallowing  Treating symptoms  If you have been diagnosed with hiatal hernia, these suggestions may help improve symptoms:  · Lose excess weight. Extra weight puts pressure on the stomach and esophagus.  · Dont lie down after eating. Sit up for at least an hour after eating. Lying down after eating can increase symptoms.  · Avoid certain foods and drinks. These include fatty foods, chocolate, coffee, mint, and other foods that cause symptoms for you.  · Dont smoke or drink alcohol. These can worsen symptoms.  · Look at your medicines. Discuss your medicines with your healthcare provider. Many medicines can cause symptoms.  · Consider an antacid medicine. Ask your healthcare provider about over-the-counter and prescription medicines that may help.  · Ask about surgery, if needed. Surgery is a treatment  choice for some people. Your healthcare provider can determine if surgery is an option for you.    Date Last Reviewed: 10/1/2016  © 0625-5283 [employer]. [address] [place] [address], [place] [address]. All rights reserved. This information is not intended as a substitute for professional medical care. Always follow your healthcare professional's instructions.        Gastritis (Adult)    Gastritis is inflammation and irritation of the stomach lining. It can be present for a short time (acute) or be long lasting (chronic). Gastritis is often caused by infection with bacteria called H pylori. More than a third of people in the [place] have this bacteria in their bodies. In many cases, H pylori causes no problems or symptoms. In some people, though, the infection irritates the stomach lining and causes gastritis. Other causes of stomach irritation include drinking alcohol or taking pain-relieving medicines called NSAIDs (such as aspirin or ibuprofen).   Symptoms of gastritis can include:  · Abdominal pain or bloating  · Loss of appetite  · Nausea or vomiting  · Vomiting blood or having black stools  · Feeling more tired than usual  An inflamed and irritated stomach lining is more likely to develop a sore called an ulcer. To help prevent this, gastritis should be treated.  Home care  If needed, medicines may be prescribed. If you have H pylori infection, treating it will likely relieve your symptoms. Other changes can help reduce stomach irritation and help it heal.  · If you have been prescribed medicines for H pylori infection, take them as directed. Take all of the medicine until it is finished or your healthcare provider tells you to stop, even if you feel better.  · Your healthcare provider may recommend avoiding NSAIDs. If you take daily aspirin for your heart or other medical reasons, do not stop without talking to your healthcare provider first.  · Avoid drinking alcohol.  · Stop smoking. Smoking can  irritate the stomach and delay healing. As much as possible, stay away from second hand smoke.  Follow-up care  Follow up with your healthcare provider, or as advised by our staff. Testing may be needed to check for inflammation or an ulcer.  When to seek medical advice  Call your healthcare provider for any of the following:  · Stomach pain that gets worse or moves to the lower right abdomen (appendix area)  · Chest pain that appears or gets worse, or spreads to the back, neck, shoulder, or arm  · Frequent vomiting (cant keep down liquids)  · Blood in the stool or vomit (red or black in color)  · Feeling weak or dizzy  · Fever of 100.4ºF (38ºC) or higher, or as directed by your healthcare provider  Date Last Reviewed: 6/22/2015 © 2000-2017 [employer]. [address] [place] [address], [place] [address]. All rights reserved. This information is not intended as a substitute for professional medical care. Always follow your healthcare professional's instructions.        Esophagitis     With esophagitis, the lining of the esophagus is inflamed.   Do you often have burning pain in your chest? You may have esophagitis. This is when the lining of the esophagus becomes red and swollen (inflamed). The esophagus is the tube that connects your throat to your stomach. This sheet tells you more about esophagitis. It also explains your treatment options.  Main types of esophagitis  Reflux esophagitis. This is the more common type. It is caused by GERD (gastroesophageal reflux disease). Stomach contents with stomach acid flow back up into the esophagus. This happens over and over. It leads to inflammation. Risk factors can include:  · Being overweight  · Asthma  · Smoking  · Pregnancy  · Frequent vomiting  · Certain medicines (such as aspirin and other anti-inflammatories)  · Hiatal hernia  Infectious esophagitis. This is caused by an infection. You are more at risk for this if you have a weakened immune system and poor  nutrition. Antibiotic use can also be a factor. The infection is often due to the following:  · A type of fungus (typically candida)  · A virus, such as herpes simplex virus 1 (HSV-1) or cytomegalovirus (CMV)  Eosinophilic esophagitis. Foods or other things around you can give you an allergic reaction. This triggers an immune response and leads to esophagitis.  Pill-induced esophagitis. Certain types of medicines can cause inflammation and ulcers in the esophagus. These include doxycycline, aspirin, NSAIDs, alendronate, potassium, quinidine, iron.  Symptoms of esophagitis  The following symptoms can occur with esophagitis:  · Pain when swallowing, or trouble swallowing  · Pain behind your breastbone (heartburn)  · Acid regurgitation  · Chronic sore throat  · Gum Inflammation  · Cavities  · Bad breath  · Nausea  · Pain in your upper belly (abdomen)  · Bleeding (indicated by bright red vomit or black, tarry stool)  These symptoms occur more often with reflux esophagitis:  · Coughing, wheezing, or asthma  · Hoarseness  Diagnosis of esophagitis  Your healthcare provider will ask about your health history and symptoms. Youll also be examined. Sometimes certain tests are needed. These may include:  · Upper endoscopy. A thin, flexible tube with a tiny light and camera is used. It is inserted through the mouth down into the esophagus. This lets the provider look for damage. A small sample of tissue (biopsy) may also be removed. The sample is sent to a lab for testing.  · Upper GI X-ray with barium. An X-ray is done after you drink a substance called barium. Barium may make problems in the esophagus easier to see on an x-ray.  · Esophageal pH. A soft, thin tube is passed into the esophagus through the nose or mouth for 24 hours. It measures the acid level in the esophagus.  · Esophageal manometry. A soft, thin tube is passed into the esophagus through the nose or mouth. It measures muscle contractions in the  esophagus.  Treatment of esophagitis  Medicines. Different medicines can help treat esophagitis. The medicine used will depend on the type of esophagitis you have. Talk with your healthcare provider.  Lifestyle changes. Making the following changes can help reduce irritation and ease your symptoms:  · Avoid spicy foods (pepper, chili powder, smith). Also avoid hard foods (nuts, crackers, raw vegetables) and acidic foods and drinks (tomatoes, citrus fruits and juices). Other problem foods include chocolate, peppermint, nutmeg, and foods high in fat.  · Until you can swallow without pain, follow a combined liquid and soft diet. Try foods such as cooked cereals, mashed potatoes, and soups.  · Take small bites and chew your food thoroughly.  · Avoid large meals and heavy evening meals. Don't lie down within 2 to 3 hours of eating.  · Get to or stay at a healthy weight.  · Avoid alcohol, caffeine, and smoking or tobacco products.  · Brush and floss your teeth  · Raise your upper body by 4 to 6 inches when lying in bed. This can be done using a foam wedge. Or put blocks under the legs at the head of your bed.  Surgery. This may be needed for severe reflux esophagitis. Other noninvasive procedures to treat GERD and esophagitis are being studied. Your provider can tell you more.  Why treatment Is important  Without treatment, esophagitis can get worse. This is especially true with severe reflux esophagitis. For instance, continued symptoms can cause scarring of the esophagus. Over time, this can cause a narrowing the esophagus (stricture). This can make it hard to pass food down to the stomach. As symptoms go on they can also cause changes in the lining of the esophagus. These changes can put you at a slightly higher risk of cancer of the esophagus.   Date Last Reviewed: 7/1/2016  © 6635-3784 [employer]. [address] [place] [address], [place] [address]. All rights reserved. This information is not intended as a  substitute for professional medical care. Always follow your healthcare professional's instructions.        Upper GI Endoscopy     During endoscopy, a long, flexible tube is used to view the inside of your upper GI tract.      Upper GI endoscopy allows your healthcare provider to look directly into the beginning of your gastrointestinal (GI) tract. The esophagus, stomach, and duodenum (the first part of the small intestine) make up the upper GI tract.   Before the exam  Follow these and any other instructions you are given before your endoscopy. If you dont follow the healthcare providers instructions carefully, the test may need to be canceled or done over:  · Don't eat or drink anything after midnight the night before your exam. If your exam is in the afternoon, drink only clear liquids in the morning. Don't eat or drink anything for 8 hours before the exam. In some cases, you may be able to take medicines with sips of water until 2 hours before the procedure. Speak with your healthcare provider about this.   · Bring your X-rays and any other test results you have.  · Because you will be sedated, arrange for an adult to drive you home after the exam.  · Tell your healthcare provider before the exam if you are taking any medicines or have any medical problems.  The procedure  Here is what to expect:  · You will lie on the endoscopy table. Usually patients lie on the left side.  · You will be monitored and given oxygen.  · Your throat may be numbed with a spray or gargle. You are given medicine through an intravenous (IV) line that will help you relax and remain comfortable. You may be awake or asleep during the procedure.  · The healthcare provider will put the endoscope in your mouth and down your esophagus. It is thinner than most pieces of food that you swallow. It will not affect your breathing. The medicine helps keep you from gagging.  · Air is put into your GI tract to expand it. It can make you  burp.  · During the procedure, the healthcare provider can take biopsies (tissue samples), remove abnormalities, such as polyps, or treat abnormalities through a variety of devices placed through the endoscope. You will not feel this.   · The endoscope carries images of your upper GI tract to a video screen. If you are awake, you may be able to look at the images.  · After the procedure is done, you will rest for a time. An adult must drive you home.  When to call your healthcare provider  Contact your healthcare provider if you have:  · Black or tarry stools, or blood in your stool  · Fever  · Pain in your belly that does not go away  · Nausea and vomiting, or vomiting blood   Date Last Reviewed: 7/1/2016  © 8925-2450 [employer]. [address] [place] [address], [place] [address]. All rights reserved. This information is not intended as a substitute for professional medical care. Always follow your healthcare professional's instructions.

## 2019-12-23 NOTE — ANESTHESIA RELEASE NOTE
"Anesthesia Release from PACU Note    Patient: [facility]    Procedure(s) Performed: Procedure(s) (LRB):  EGD (ESOPHAGOGASTRODUODENOSCOPY) (N/A)    Anesthesia type: MAC    Post pain: Adequate analgesia    Post assessment: no apparent anesthetic complications and tolerated procedure well    Last Vitals:   Visit Vitals  /78   Pulse 92   Temp 36.6 °C (97.9 °F)   Resp 16   Ht 5' 6" (1.676 m)   Wt (!) 144.2 kg (317 lb 14.5 oz)   LMP 11/25/2019   SpO2 99%   Breastfeeding? No   BMI 51.31 kg/m²       Post vital signs: stable    Level of consciousness: sedated    Nausea/Vomiting: no nausea/no vomiting    Complications: none    Airway Patency: patent    Respiratory: unassisted, spontaneous ventilation, room air    Cardiovascular: stable and blood pressure at baseline    Hydration: euvolemic  "

## 2019-12-23 NOTE — DISCHARGE SUMMARY
Endoscopy Discharge Summary      Admit Date: 12/23/2019    Discharge Date and Time:  12/23/2019 7:29 AM    Attending Physician: Stefanie Monique MD     Discharge Physician: Stefanie Monique MD     Principal Admitting Diagnoses: Esophagitis, [place] grade C         Discharge Diagnosis: The encounter diagnosis was Esophagitis, [place] grade C.     Discharged Condition: Good    Indication for Admission: Esophagitis, [place] grade C     Hospital Course: Patient was admitted for an inpatient procedure and tolerated the procedure well with no complications.    Significant Diagnostic Studies: EGD    Pathology (if any):  Specimen (12h ago, onward)    None          Estimated Blood Loss: 0 ml.    Discussed with: patient.    Disposition: Home.    Follow Up/Patient Instructions:   Current Discharge Medication List      CONTINUE these medications which have NOT CHANGED    Details   buPROPion (WELLBUTRIN) 75 MG tablet Take 1 tablet (75 mg total) by mouth 2 (two) times daily.  Qty: 180 tablet, Refills: 1    Associated Diagnoses: Anxiety and depression      ipratropium (ATROVENT) 0.03 % nasal spray 2 sprays by Nasal route 3 (three) times daily.  Qty: 30 mL, Refills: 0    Associated Diagnoses: Upper respiratory tract infection, unspecified type      metoprolol succinate (TOPROL-XL) 50 MG 24 hr tablet Take 1 tablet (50 mg total) by mouth once daily.  Qty: 90 tablet, Refills: 0    Associated Diagnoses: Essential hypertension      pantoprazole (PROTONIX) 40 MG tablet TAKE 1 TABLET BY MOUTH EVERY DAY  Qty: 30 tablet, Refills: 5    Associated Diagnoses: Gastroesophageal reflux disease, esophagitis presence not specified             No discharge procedures on file.        Stefanie Monique MD  Gastroenterology and Hepatology

## 2019-12-23 NOTE — ANESTHESIA PREPROCEDURE EVALUATION
12/23/2019  Mary Russo is a 49 y.o., female.    Anesthesia Evaluation    I have reviewed the Patient Summary Reports.    I have reviewed the Nursing Notes.   I have reviewed the Medications.     Review of Systems  Anesthesia Hx:  No problems with previous Anesthesia  History of prior surgery of interest to airway management or planning: Denies Family Hx of Anesthesia complications.   Denies Personal Hx of Anesthesia complications.   Hematology/Oncology:  Hematology Normal   Oncology Normal     EENT/Dental:EENT/Dental Normal   Cardiovascular:   Hypertension    Pulmonary:  Pulmonary Normal    Hepatic/GI:   GERD    Musculoskeletal:  Musculoskeletal Normal    Neurological:   Neuromuscular Disease,    Endocrine:  Endocrine Normal    Psych:   Psychiatric History          Physical Exam  General:  Morbid Obesity    Airway/Jaw/Neck:  Airway Findings: Mouth Opening: Normal Tongue: Normal  General Airway Assessment: Adult  Mallampati: III  TM Distance: Normal, at least 6 cm        Eyes/Ears/Nose:  EYES/EARS/NOSE FINDINGS: Normal    Chest/Lungs:  Chest/Lungs Clear    Heart/Vascular:  Heart Findings: Normal Heart murmur: negative Vascular Findings: Normal    Abdomen:  Abdomen Findings: Normal    Musculoskeletal:  Musculoskeletal Findings: Normal   Skin:  Skin Findings: Normal    Mental Status:  Mental Status Findings: Normal        Anesthesia Plan  Type of Anesthesia, risks & benefits discussed:  Anesthesia Type:  MAC  Patient's Preference:   Intra-op Monitoring Plan: standard ASA monitors  Intra-op Monitoring Plan Comments:   Post Op Pain Control Plan: multimodal analgesia  Post Op Pain Control Plan Comments:   Induction:   IV  Beta Blocker:         Informed Consent: Patient understands risks and agrees with Anesthesia plan.  Questions answered. Anesthesia consent signed with patient.  ASA Score: 3     Day of  Surgery Review of History & Physical: I have interviewed and examined the patient. I have reviewed the patient's H&P dated:            Ready For Surgery From Anesthesia Perspective.

## 2019-12-23 NOTE — ANESTHESIA POSTPROCEDURE EVALUATION
Anesthesia Post Evaluation    Patient: [facility]    Procedure(s) Performed: Procedure(s) (LRB):  EGD (ESOPHAGOGASTRODUODENOSCOPY) (N/A)    Final Anesthesia Type: MAC    Patient location during evaluation: PACU  Patient participation: No - Unable to Participate, Sedation  Level of consciousness: sedated  Post-procedure vital signs: reviewed and stable  Pain management: adequate  Airway patency: patent  PRIYANKA mitigation strategies: Multimodal analgesia  PONV status at discharge: No PONV  Anesthetic complications: no      Cardiovascular status: blood pressure returned to baseline and stable  Respiratory status: unassisted, spontaneous ventilation and room air  Hydration status: euvolemic  Follow-up not needed.          Vitals Value Taken Time   /78 12/23/2019  7:29 AM   Temp 36.6 °C (97.9 °F) 12/23/2019  7:03 AM   Pulse 92 12/23/2019  7:29 AM   Resp 16 12/23/2019  7:29 AM   SpO2 99 % 12/23/2019  7:29 AM         No case tracking events are documented in the log.      Pain/Barrett Score: Barrett Score: 8 (12/23/2019  7:29 AM)

## 2019-12-23 NOTE — H&P
Short Stay Endoscopy History and Physical    PCP - Deloris Aiken MD    Procedure - EGD  ASA - 2  Mallampati - per anesthesia  History of Anesthesia problems - no  Family history Anesthesia problems -  no     HPI:  This is a 49 y.o. female here for evaluation of :   Active Hospital Problems    Diagnosis  POA    *Esophagitis, Harrison grade C [K20.8]  Unknown      Resolved Hospital Problems   No resolved problems to display.         Reflux - yes  Dysphagia - no  Abdominal pain - no  Diarrhea - no  Anemia - no  GI bleeding - no    ROS:  CONSTITUTIONAL: Denies weight change,  fatigue, fevers, chills, night sweats.  CARDIOVASCULAR: Denies chest pain, shortness of breath, orthopnea and edema.  RESPIRATORY: Denies cough, hemoptysis, dyspnea, and wheezing.  GI: See HPI.    Medical History:   Past Medical History:   Diagnosis Date    Acid reflux     Anxiety     Arthritis     Carpal tunnel syndrome of right wrist     Cholelithiases     Hypertension     Iron deficiency     Morbid obesity        Surgical History:   Past Surgical History:   Procedure Laterality Date    CHOLECYSTECTOMY      ESOPHAGOGASTRODUODENOSCOPY N/A 10/14/2019    Procedure: ESOPHAGOGASTRODUODENOSCOPY (EGD);  Surgeon: Stefanie Monique MD;  Location: [facility];  Service: Endoscopy;  Laterality: N/A;    HERNIA REPAIR      LAPAROSCOPIC GASTRIC BANDING  10/31/2017    removed    REFRACTIVE SURGERY      TUBAL LIGATION         Family History:  Family History   Problem Relation Age of Onset    Cancer Mother 49        breast cancer    Heart disease Mother     Diabetes Mother     Stroke Mother     Obesity Mother     Breast cancer Mother     Heart disease Father     Obesity Father     Obesity Sister     Obesity Maternal Grandmother     Obesity Sister     Colon cancer Neg Hx     Ovarian cancer Neg Hx        Social History:   Social History     Tobacco Use    Smoking status: Never Smoker    Smokeless tobacco: Never Used   Substance  Use Topics    Alcohol use: Yes     Frequency: Monthly or less     Drinks per session: 1 or 2     Binge frequency: Never     Comment: occasionally    Drug use: No       Allergy  Review of patient's allergies indicates:   Allergen Reactions    Adhesive Swelling     Swelling x2 with flu shot and band-aid.  Swells in shape of band-aid.    Lisinopril Other (See Comments)     Cough    Latex, natural rubber Swelling     Localized swelling at site of bandaid/adhesive       Medications:   No current facility-administered medications on file prior to encounter.      Current Outpatient Medications on File Prior to Encounter   Medication Sig Dispense Refill    buPROPion (WELLBUTRIN) 75 MG tablet Take 1 tablet (75 mg total) by mouth 2 (two) times daily. 180 tablet 1    metoprolol succinate (TOPROL-XL) 50 MG 24 hr tablet Take 1 tablet (50 mg total) by mouth once daily. 90 tablet 0       Physical Exam:  Vital Signs: There were no vitals filed for this visit.  General Appearance: Well appearing in no acute distress  ENT: OP clear  Chest: CTA B  CV: RRR, no m/r/g  Abd: s/nt/nd/nabs  Ext: no edema    Labs:  Reviewed    IMP:  Active Hospital Problems    Diagnosis  POA    *Esophagitis, [place] grade C [K20.8]  Unknown      Resolved Hospital Problems   No resolved problems to display.         Plan:  I have explained the risks and benefits of endoscopy procedures to the patient including but not limited to bleeding, perforation, infection, and death. The patient wishes to proceed.

## 2020-01-12 DIAGNOSIS — F32.A ANXIETY AND DEPRESSION: ICD-10-CM

## 2020-01-12 DIAGNOSIS — F41.9 ANXIETY AND DEPRESSION: ICD-10-CM

## 2020-01-13 RX ORDER — BUPROPION HYDROCHLORIDE 75 MG/1
75 TABLET ORAL 2 TIMES DAILY
Qty: 60 TABLET | Refills: 0 | Status: SHIPPED | OUTPATIENT
Start: 2020-01-13 | End: 2020-01-21 | Stop reason: SDUPTHER

## 2020-01-14 DIAGNOSIS — F41.9 ANXIETY AND DEPRESSION: ICD-10-CM

## 2020-01-14 DIAGNOSIS — F32.A ANXIETY AND DEPRESSION: ICD-10-CM

## 2020-01-14 RX ORDER — BUPROPION HYDROCHLORIDE 75 MG/1
TABLET ORAL
Qty: 180 TABLET | Refills: 1 | OUTPATIENT
Start: 2020-01-14

## 2020-01-16 ENCOUNTER — PATIENT MESSAGE (OUTPATIENT)
Dept: GASTROENTEROLOGY | Facility: CLINIC | Age: 50
End: 2020-01-16

## 2020-01-17 ENCOUNTER — IMMUNIZATION (OUTPATIENT)
Dept: FAMILY MEDICINE | Facility: CLINIC | Age: 50
End: 2020-01-17
Payer: COMMERCIAL

## 2020-01-17 PROCEDURE — 90686 IIV4 VACC NO PRSV 0.5 ML IM: CPT | Mod: S$GLB,,, | Performed by: REGISTERED NURSE

## 2020-01-17 PROCEDURE — 99999 PR PBB SHADOW E&M-EST. PATIENT-LVL II: ICD-10-PCS | Mod: PBBFAC,,,

## 2020-01-17 PROCEDURE — 99999 PR PBB SHADOW E&M-EST. PATIENT-LVL II: CPT | Mod: PBBFAC,,,

## 2020-01-17 PROCEDURE — 90471 IMMUNIZATION ADMIN: CPT | Mod: S$GLB,,, | Performed by: REGISTERED NURSE

## 2020-01-17 PROCEDURE — 90471 FLU VACCINE (QUAD) GREATER THAN OR EQUAL TO 3YO PRESERVATIVE FREE IM: ICD-10-PCS | Mod: S$GLB,,, | Performed by: REGISTERED NURSE

## 2020-01-17 PROCEDURE — 90686 FLU VACCINE (QUAD) GREATER THAN OR EQUAL TO 3YO PRESERVATIVE FREE IM: ICD-10-PCS | Mod: S$GLB,,, | Performed by: REGISTERED NURSE

## 2020-01-17 NOTE — LETTER
January 17, 2020      [facility]  [address] [place] [address]  Phone: [phone]  Fax: [phone]       Patient: Mary Russo   YOB: 1970  Date of Visit: 01/17/2020    To Whom It May Concern:    Chen Russo  was at [facility] on 01/17/2020. She may return to work/school on 1/17/2020 with no restrictions. If you have any questions or concerns, or if I can be of further assistance, please do not hesitate to contact me.    Sincerely,        Meghna Finnegan LPN

## 2020-01-20 ENCOUNTER — PATIENT MESSAGE (OUTPATIENT)
Dept: INTERNAL MEDICINE | Facility: CLINIC | Age: 50
End: 2020-01-20

## 2020-01-20 ENCOUNTER — PATIENT MESSAGE (OUTPATIENT)
Dept: FAMILY MEDICINE | Facility: CLINIC | Age: 50
End: 2020-01-20

## 2020-01-20 DIAGNOSIS — F32.A ANXIETY AND DEPRESSION: ICD-10-CM

## 2020-01-20 DIAGNOSIS — F41.9 ANXIETY AND DEPRESSION: ICD-10-CM

## 2020-01-20 RX ORDER — BUPROPION HYDROCHLORIDE 75 MG/1
TABLET ORAL
Qty: 180 TABLET | Refills: 1 | OUTPATIENT
Start: 2020-01-20

## 2020-01-22 RX ORDER — BUPROPION HYDROCHLORIDE 75 MG/1
75 TABLET ORAL 2 TIMES DAILY
Qty: 60 TABLET | Refills: 0 | Status: SHIPPED | OUTPATIENT
Start: 2020-01-22 | End: 2020-02-17

## 2020-02-16 DIAGNOSIS — F32.A ANXIETY AND DEPRESSION: ICD-10-CM

## 2020-02-16 DIAGNOSIS — F41.9 ANXIETY AND DEPRESSION: ICD-10-CM

## 2020-02-17 RX ORDER — BUPROPION HYDROCHLORIDE 75 MG/1
75 TABLET ORAL 2 TIMES DAILY
Qty: 60 TABLET | Refills: 0 | Status: SHIPPED | OUTPATIENT
Start: 2020-02-17 | End: 2020-03-20

## 2020-02-17 NOTE — TELEPHONE ENCOUNTER
30 day supply approved, needs appointment prior to additional refills. Patient did not return for annual as advised.

## 2020-02-18 ENCOUNTER — PATIENT MESSAGE (OUTPATIENT)
Dept: INTERNAL MEDICINE | Facility: CLINIC | Age: 50
End: 2020-02-18

## 2020-03-20 DIAGNOSIS — F41.9 ANXIETY AND DEPRESSION: ICD-10-CM

## 2020-03-20 DIAGNOSIS — F32.A ANXIETY AND DEPRESSION: ICD-10-CM

## 2020-03-20 RX ORDER — BUPROPION HYDROCHLORIDE 75 MG/1
75 TABLET ORAL 2 TIMES DAILY
Qty: 60 TABLET | Refills: 5 | Status: SHIPPED | OUTPATIENT
Start: 2020-03-20 | End: 2020-10-09 | Stop reason: SDUPTHER

## 2020-03-30 ENCOUNTER — PATIENT MESSAGE (OUTPATIENT)
Dept: INTERNAL MEDICINE | Facility: CLINIC | Age: 50
End: 2020-03-30

## 2020-03-30 DIAGNOSIS — I10 ESSENTIAL HYPERTENSION: ICD-10-CM

## 2020-03-31 ENCOUNTER — PATIENT MESSAGE (OUTPATIENT)
Dept: INTERNAL MEDICINE | Facility: CLINIC | Age: 50
End: 2020-03-31

## 2020-03-31 RX ORDER — METOPROLOL SUCCINATE 50 MG/1
50 TABLET, EXTENDED RELEASE ORAL DAILY
Qty: 90 TABLET | Refills: 0 | Status: SHIPPED | OUTPATIENT
Start: 2020-03-31 | End: 2020-06-16 | Stop reason: SDUPTHER

## 2020-04-08 DIAGNOSIS — K21.9 GASTROESOPHAGEAL REFLUX DISEASE, ESOPHAGITIS PRESENCE NOT SPECIFIED: ICD-10-CM

## 2020-04-08 RX ORDER — PANTOPRAZOLE SODIUM 40 MG/1
TABLET, DELAYED RELEASE ORAL
Qty: 90 TABLET | Refills: 1 | Status: SHIPPED | OUTPATIENT
Start: 2020-04-08 | End: 2021-11-03 | Stop reason: SDUPTHER

## 2020-06-16 ENCOUNTER — OFFICE VISIT (OUTPATIENT)
Dept: INTERNAL MEDICINE | Facility: CLINIC | Age: 50
End: 2020-06-16
Payer: COMMERCIAL

## 2020-06-16 VITALS
BODY MASS INDEX: 47.09 KG/M2 | SYSTOLIC BLOOD PRESSURE: 132 MMHG | DIASTOLIC BLOOD PRESSURE: 86 MMHG | TEMPERATURE: 98 F | OXYGEN SATURATION: 99 % | HEART RATE: 92 BPM | WEIGHT: 293 LBS | HEIGHT: 66 IN

## 2020-06-16 DIAGNOSIS — Z00.00 ROUTINE GENERAL MEDICAL EXAMINATION AT A HEALTH CARE FACILITY: Primary | ICD-10-CM

## 2020-06-16 DIAGNOSIS — F41.9 ANXIETY AND DEPRESSION: ICD-10-CM

## 2020-06-16 DIAGNOSIS — I10 ESSENTIAL HYPERTENSION: ICD-10-CM

## 2020-06-16 DIAGNOSIS — K21.9 GASTROESOPHAGEAL REFLUX DISEASE, ESOPHAGITIS PRESENCE NOT SPECIFIED: ICD-10-CM

## 2020-06-16 DIAGNOSIS — M54.41 ACUTE BILATERAL LOW BACK PAIN WITH BILATERAL SCIATICA: ICD-10-CM

## 2020-06-16 DIAGNOSIS — M54.42 ACUTE BILATERAL LOW BACK PAIN WITH BILATERAL SCIATICA: ICD-10-CM

## 2020-06-16 DIAGNOSIS — F32.A ANXIETY AND DEPRESSION: ICD-10-CM

## 2020-06-16 DIAGNOSIS — D50.9 IRON DEFICIENCY ANEMIA, UNSPECIFIED IRON DEFICIENCY ANEMIA TYPE: ICD-10-CM

## 2020-06-16 DIAGNOSIS — E66.01 MORBID OBESITY WITH BMI OF 50.0-59.9, ADULT: ICD-10-CM

## 2020-06-16 DIAGNOSIS — L60.8 DISCOLORATION OF NAILBEDS: ICD-10-CM

## 2020-06-16 PROCEDURE — 99396 PR PREVENTIVE VISIT,EST,40-64: ICD-10-PCS | Mod: S$GLB,,, | Performed by: FAMILY MEDICINE

## 2020-06-16 PROCEDURE — 99396 PREV VISIT EST AGE 40-64: CPT | Mod: S$GLB,,, | Performed by: FAMILY MEDICINE

## 2020-06-16 PROCEDURE — 3075F PR MOST RECENT SYSTOLIC BLOOD PRESS GE 130-139MM HG: ICD-10-PCS | Mod: CPTII,S$GLB,, | Performed by: FAMILY MEDICINE

## 2020-06-16 PROCEDURE — 3079F PR MOST RECENT DIASTOLIC BLOOD PRESSURE 80-89 MM HG: ICD-10-PCS | Mod: CPTII,S$GLB,, | Performed by: FAMILY MEDICINE

## 2020-06-16 PROCEDURE — 99999 PR PBB SHADOW E&M-EST. PATIENT-LVL IV: ICD-10-PCS | Mod: PBBFAC,,, | Performed by: FAMILY MEDICINE

## 2020-06-16 PROCEDURE — 3075F SYST BP GE 130 - 139MM HG: CPT | Mod: CPTII,S$GLB,, | Performed by: FAMILY MEDICINE

## 2020-06-16 PROCEDURE — 3079F DIAST BP 80-89 MM HG: CPT | Mod: CPTII,S$GLB,, | Performed by: FAMILY MEDICINE

## 2020-06-16 PROCEDURE — 99999 PR PBB SHADOW E&M-EST. PATIENT-LVL IV: CPT | Mod: PBBFAC,,, | Performed by: FAMILY MEDICINE

## 2020-06-16 RX ORDER — CYCLOBENZAPRINE HCL 10 MG
10 TABLET ORAL 3 TIMES DAILY PRN
Qty: 30 TABLET | Refills: 0 | Status: SHIPPED | OUTPATIENT
Start: 2020-06-16 | End: 2020-06-26

## 2020-06-16 RX ORDER — METOPROLOL SUCCINATE 50 MG/1
50 TABLET, EXTENDED RELEASE ORAL DAILY
Qty: 90 TABLET | Refills: 1 | Status: SHIPPED | OUTPATIENT
Start: 2020-06-16 | End: 2021-01-11

## 2020-06-16 NOTE — PROGRESS NOTES
Subjective:       Patient ID: Mary Russo is a 50 y.o. female.    Chief Complaint: Annual Exam    Patient presents to clinic today for annual physical exam. She reports back pain x 2 weeks. She reports discoloration of great toenail. Patient is otherwise without concerns today.      Review of Systems   Constitutional: Negative for activity change and unexpected weight change.   HENT: Positive for rhinorrhea. Negative for hearing loss and trouble swallowing.    Eyes: Negative for discharge and visual disturbance.   Respiratory: Negative for chest tightness and wheezing.    Cardiovascular: Negative for chest pain and palpitations.   Gastrointestinal: Negative for blood in stool, constipation, diarrhea and vomiting.   Endocrine: Negative for polydipsia and polyuria.   Genitourinary: Negative for difficulty urinating, dysuria, hematuria and menstrual problem.   Musculoskeletal: Positive for arthralgias and back pain (radiation down bilateral legs; x 2 weeks; denies bowel/bladder incontinence). Negative for joint swelling and neck pain.   Neurological: Negative for weakness and headaches.   Psychiatric/Behavioral: Negative for confusion and dysphoric mood.       Objective:      Physical Exam  Vitals signs reviewed.   Constitutional:       General: She is not in acute distress.     Appearance: Normal appearance. She is well-developed.   HENT:      Head: Normocephalic and atraumatic.      Right Ear: Tympanic membrane, ear canal and external ear normal.      Left Ear: Tympanic membrane, ear canal and external ear normal.      Nose: Nose normal. No mucosal edema or rhinorrhea.      Mouth/Throat:      Pharynx: Uvula midline.   Eyes:      General: Lids are normal.      Conjunctiva/sclera: Conjunctivae normal.      Pupils: Pupils are equal, round, and reactive to light.   Neck:      Musculoskeletal: Normal range of motion and neck supple.      Thyroid: No thyromegaly.   Cardiovascular:      Rate and Rhythm: Normal  rate and regular rhythm.      Heart sounds: No murmur. No friction rub. No gallop.    Pulmonary:      Effort: Pulmonary effort is normal.      Breath sounds: Normal breath sounds. No wheezing, rhonchi or rales.   Abdominal:      General: Bowel sounds are normal. There is no distension.      Palpations: Abdomen is soft. There is no mass.      Tenderness: There is no abdominal tenderness.   Musculoskeletal:      Lumbar back: She exhibits decreased range of motion and tenderness.      Comments: Right lumbar paraspinous muscles tender to palpation   Lymphadenopathy:      Cervical: No cervical adenopathy.   Skin:     General: Skin is warm and dry.      Findings: No lesion or rash.      Nails: There is no clubbing.     Neurological:      Mental Status: She is alert and oriented to person, place, and time.      Cranial Nerves: No cranial nerve deficit.      Sensory: No sensory deficit.      Gait: Gait normal.      Deep Tendon Reflexes:      Reflex Scores:       Patellar reflexes are 2+ on the right side and 2+ on the left side.        Assessment:       1. Routine general medical examination at a health care facility    2. Essential hypertension    3. Anxiety and depression    4. Morbid obesity with BMI of 50.0-59.9, adult    5. Gastroesophageal reflux disease, esophagitis presence not specified    6. Iron deficiency anemia, unspecified iron deficiency anemia type    7. Acute bilateral low back pain with bilateral sciatica    8. Discoloration of nailbeds        Plan:     Problem List Items Addressed This Visit     Acute bilateral low back pain with bilateral sciatica    Relevant Medications    cyclobenzaprine (FLEXERIL) 10 MG tablet    Anxiety and depression    Current Assessment & Plan     Stable on wellbutrin         Discoloration of nailbeds    Relevant Orders    Ambulatory referral/consult to Dermatology    Essential hypertension    Current Assessment & Plan     Controlled, continue metoprolol         Relevant  Medications    metoprolol succinate (TOPROL-XL) 50 MG 24 hr tablet    GERD (gastroesophageal reflux disease)    Current Assessment & Plan     Continue protonix; advised follow up with GI as she continues to have epigastric pain; patient states she will schedule appointment         Iron deficiency anemia    Current Assessment & Plan     Check CBC         Morbid obesity with BMI of 50.0-59.9, adult    Current Assessment & Plan     Encouraged lifestyle changes to lose weight           Other Visit Diagnoses     Routine general medical examination at a health care facility    -  Primary          Health Maintenance reviewed/updated.

## 2020-06-17 ENCOUNTER — LAB VISIT (OUTPATIENT)
Dept: LAB | Facility: HOSPITAL | Age: 50
End: 2020-06-17
Attending: FAMILY MEDICINE
Payer: COMMERCIAL

## 2020-06-17 DIAGNOSIS — Z13.220 SCREENING FOR LIPOID DISORDERS: ICD-10-CM

## 2020-06-17 DIAGNOSIS — Z00.00 ROUTINE GENERAL MEDICAL EXAMINATION AT A HEALTH CARE FACILITY: ICD-10-CM

## 2020-06-17 DIAGNOSIS — Z13.29 THYROID DISORDER SCREEN: ICD-10-CM

## 2020-06-17 LAB
BASOPHILS # BLD AUTO: 0.08 K/UL (ref 0–0.2)
BASOPHILS NFR BLD: 0.9 % (ref 0–1.9)
DIFFERENTIAL METHOD: ABNORMAL
EOSINOPHIL # BLD AUTO: 0.4 K/UL (ref 0–0.5)
EOSINOPHIL NFR BLD: 4 % (ref 0–8)
ERYTHROCYTE [DISTWIDTH] IN BLOOD BY AUTOMATED COUNT: 16 % (ref 11.5–14.5)
HCT VFR BLD AUTO: 32.8 % (ref 37–48.5)
HGB BLD-MCNC: 9.5 G/DL (ref 12–16)
IMM GRANULOCYTES # BLD AUTO: 0.03 K/UL (ref 0–0.04)
IMM GRANULOCYTES NFR BLD AUTO: 0.3 % (ref 0–0.5)
LYMPHOCYTES # BLD AUTO: 2.8 K/UL (ref 1–4.8)
LYMPHOCYTES NFR BLD: 30.7 % (ref 18–48)
MCH RBC QN AUTO: 25.1 PG (ref 27–31)
MCHC RBC AUTO-ENTMCNC: 29 G/DL (ref 32–36)
MCV RBC AUTO: 87 FL (ref 82–98)
MONOCYTES # BLD AUTO: 0.5 K/UL (ref 0.3–1)
MONOCYTES NFR BLD: 5.7 % (ref 4–15)
NEUTROPHILS # BLD AUTO: 5.3 K/UL (ref 1.8–7.7)
NEUTROPHILS NFR BLD: 58.4 % (ref 38–73)
NRBC BLD-RTO: 0 /100 WBC
PLATELET # BLD AUTO: 365 K/UL (ref 150–350)
PMV BLD AUTO: 11 FL (ref 9.2–12.9)
RBC # BLD AUTO: 3.78 M/UL (ref 4–5.4)
WBC # BLD AUTO: 9.07 K/UL (ref 3.9–12.7)

## 2020-06-17 PROCEDURE — 84443 ASSAY THYROID STIM HORMONE: CPT

## 2020-06-17 PROCEDURE — 36415 COLL VENOUS BLD VENIPUNCTURE: CPT

## 2020-06-17 PROCEDURE — 80061 LIPID PANEL: CPT

## 2020-06-17 PROCEDURE — 80053 COMPREHEN METABOLIC PANEL: CPT

## 2020-06-17 PROCEDURE — 85025 COMPLETE CBC W/AUTO DIFF WBC: CPT

## 2020-06-17 NOTE — ASSESSMENT & PLAN NOTE
Continue protonix; advised follow up with GI as she continues to have epigastric pain; patient states she will schedule appointment

## 2020-06-18 LAB
ALBUMIN SERPL BCP-MCNC: 3 G/DL (ref 3.5–5.2)
ALP SERPL-CCNC: 68 U/L (ref 55–135)
ALT SERPL W/O P-5'-P-CCNC: 7 U/L (ref 10–44)
ANION GAP SERPL CALC-SCNC: 7 MMOL/L (ref 8–16)
AST SERPL-CCNC: 11 U/L (ref 10–40)
BILIRUB SERPL-MCNC: 0.3 MG/DL (ref 0.1–1)
BUN SERPL-MCNC: 8 MG/DL (ref 6–20)
CALCIUM SERPL-MCNC: 8.9 MG/DL (ref 8.7–10.5)
CHLORIDE SERPL-SCNC: 107 MMOL/L (ref 95–110)
CHOLEST SERPL-MCNC: 166 MG/DL (ref 120–199)
CHOLEST/HDLC SERPL: 3.2 {RATIO} (ref 2–5)
CO2 SERPL-SCNC: 27 MMOL/L (ref 23–29)
CREAT SERPL-MCNC: 0.9 MG/DL (ref 0.5–1.4)
EST. GFR  (AFRICAN AMERICAN): >60 ML/MIN/1.73 M^2
EST. GFR  (NON AFRICAN AMERICAN): >60 ML/MIN/1.73 M^2
GLUCOSE SERPL-MCNC: 71 MG/DL (ref 70–110)
HDLC SERPL-MCNC: 52 MG/DL (ref 40–75)
HDLC SERPL: 31.3 % (ref 20–50)
LDLC SERPL CALC-MCNC: 100.2 MG/DL (ref 63–159)
NONHDLC SERPL-MCNC: 114 MG/DL
POTASSIUM SERPL-SCNC: 4.3 MMOL/L (ref 3.5–5.1)
PROT SERPL-MCNC: 7.3 G/DL (ref 6–8.4)
SODIUM SERPL-SCNC: 141 MMOL/L (ref 136–145)
TRIGL SERPL-MCNC: 69 MG/DL (ref 30–150)
TSH SERPL DL<=0.005 MIU/L-ACNC: 0.87 UIU/ML (ref 0.4–4)

## 2020-06-19 ENCOUNTER — PATIENT MESSAGE (OUTPATIENT)
Dept: INTERNAL MEDICINE | Facility: CLINIC | Age: 50
End: 2020-06-19

## 2020-06-19 DIAGNOSIS — D50.9 IRON DEFICIENCY ANEMIA, UNSPECIFIED IRON DEFICIENCY ANEMIA TYPE: Primary | ICD-10-CM

## 2020-06-22 RX ORDER — FERROUS SULFATE 325(65) MG
325 TABLET, DELAYED RELEASE (ENTERIC COATED) ORAL
Qty: 90 TABLET | Refills: 0 | Status: SHIPPED | OUTPATIENT
Start: 2020-06-22 | End: 2020-07-20

## 2020-06-25 ENCOUNTER — OFFICE VISIT (OUTPATIENT)
Dept: HEMATOLOGY/ONCOLOGY | Facility: CLINIC | Age: 50
End: 2020-06-25
Payer: COMMERCIAL

## 2020-06-25 ENCOUNTER — LAB VISIT (OUTPATIENT)
Dept: LAB | Facility: HOSPITAL | Age: 50
End: 2020-06-25
Attending: INTERNAL MEDICINE
Payer: COMMERCIAL

## 2020-06-25 VITALS
HEIGHT: 66 IN | OXYGEN SATURATION: 98 % | BODY MASS INDEX: 47.09 KG/M2 | DIASTOLIC BLOOD PRESSURE: 86 MMHG | SYSTOLIC BLOOD PRESSURE: 126 MMHG | HEART RATE: 101 BPM | TEMPERATURE: 98 F | WEIGHT: 293 LBS | RESPIRATION RATE: 18 BRPM

## 2020-06-25 DIAGNOSIS — D50.9 IRON DEFICIENCY ANEMIA, UNSPECIFIED IRON DEFICIENCY ANEMIA TYPE: ICD-10-CM

## 2020-06-25 DIAGNOSIS — E66.01 MORBID OBESITY WITH BMI OF 50.0-59.9, ADULT: ICD-10-CM

## 2020-06-25 DIAGNOSIS — I10 ESSENTIAL HYPERTENSION: ICD-10-CM

## 2020-06-25 LAB
ALBUMIN SERPL BCP-MCNC: 3.1 G/DL (ref 3.5–5.2)
ALP SERPL-CCNC: 70 U/L (ref 55–135)
ALT SERPL W/O P-5'-P-CCNC: 5 U/L (ref 10–44)
ANION GAP SERPL CALC-SCNC: 7 MMOL/L (ref 8–16)
AST SERPL-CCNC: 12 U/L (ref 10–40)
BASOPHILS # BLD AUTO: 0.09 K/UL (ref 0–0.2)
BASOPHILS NFR BLD: 0.9 % (ref 0–1.9)
BILIRUB SERPL-MCNC: 0.3 MG/DL (ref 0.1–1)
BUN SERPL-MCNC: 11 MG/DL (ref 6–20)
CALCIUM SERPL-MCNC: 9 MG/DL (ref 8.7–10.5)
CHLORIDE SERPL-SCNC: 104 MMOL/L (ref 95–110)
CO2 SERPL-SCNC: 28 MMOL/L (ref 23–29)
CREAT SERPL-MCNC: 0.9 MG/DL (ref 0.5–1.4)
DIFFERENTIAL METHOD: ABNORMAL
EOSINOPHIL # BLD AUTO: 0.4 K/UL (ref 0–0.5)
EOSINOPHIL NFR BLD: 3.6 % (ref 0–8)
ERYTHROCYTE [DISTWIDTH] IN BLOOD BY AUTOMATED COUNT: 15.9 % (ref 11.5–14.5)
EST. GFR  (AFRICAN AMERICAN): >60 ML/MIN/1.73 M^2
EST. GFR  (NON AFRICAN AMERICAN): >60 ML/MIN/1.73 M^2
FERRITIN SERPL-MCNC: 27 NG/ML (ref 20–300)
GLUCOSE SERPL-MCNC: 87 MG/DL (ref 70–110)
HCT VFR BLD AUTO: 34.6 % (ref 37–48.5)
HGB BLD-MCNC: 10.2 G/DL (ref 12–16)
IMM GRANULOCYTES # BLD AUTO: 0.03 K/UL (ref 0–0.04)
IMM GRANULOCYTES NFR BLD AUTO: 0.3 % (ref 0–0.5)
IRON SERPL-MCNC: 25 UG/DL (ref 30–160)
LYMPHOCYTES # BLD AUTO: 2.8 K/UL (ref 1–4.8)
LYMPHOCYTES NFR BLD: 27.3 % (ref 18–48)
MCH RBC QN AUTO: 25.1 PG (ref 27–31)
MCHC RBC AUTO-ENTMCNC: 29.5 G/DL (ref 32–36)
MCV RBC AUTO: 85 FL (ref 82–98)
MONOCYTES # BLD AUTO: 0.7 K/UL (ref 0.3–1)
MONOCYTES NFR BLD: 6.8 % (ref 4–15)
NEUTROPHILS # BLD AUTO: 6.3 K/UL (ref 1.8–7.7)
NEUTROPHILS NFR BLD: 61.4 % (ref 38–73)
NRBC BLD-RTO: 0 /100 WBC
PLATELET # BLD AUTO: 379 K/UL (ref 150–350)
PMV BLD AUTO: 10.1 FL (ref 9.2–12.9)
POTASSIUM SERPL-SCNC: 3.7 MMOL/L (ref 3.5–5.1)
PROT SERPL-MCNC: 7.8 G/DL (ref 6–8.4)
RBC # BLD AUTO: 4.06 M/UL (ref 4–5.4)
SATURATED IRON: 6 % (ref 20–50)
SODIUM SERPL-SCNC: 139 MMOL/L (ref 136–145)
TOTAL IRON BINDING CAPACITY: 422 UG/DL (ref 250–450)
TRANSFERRIN SERPL-MCNC: 285 MG/DL (ref 200–375)
WBC # BLD AUTO: 10.21 K/UL (ref 3.9–12.7)

## 2020-06-25 PROCEDURE — 82728 ASSAY OF FERRITIN: CPT

## 2020-06-25 PROCEDURE — 82746 ASSAY OF FOLIC ACID SERUM: CPT

## 2020-06-25 PROCEDURE — 36415 COLL VENOUS BLD VENIPUNCTURE: CPT

## 2020-06-25 PROCEDURE — 99245 PR OFFICE CONSULTATION,LEVEL V: ICD-10-PCS | Mod: S$GLB,,, | Performed by: INTERNAL MEDICINE

## 2020-06-25 PROCEDURE — 83540 ASSAY OF IRON: CPT

## 2020-06-25 PROCEDURE — 99999 PR PBB SHADOW E&M-EST. PATIENT-LVL V: ICD-10-PCS | Mod: PBBFAC,,, | Performed by: INTERNAL MEDICINE

## 2020-06-25 PROCEDURE — 99999 PR PBB SHADOW E&M-EST. PATIENT-LVL V: CPT | Mod: PBBFAC,,, | Performed by: INTERNAL MEDICINE

## 2020-06-25 PROCEDURE — 82607 VITAMIN B-12: CPT

## 2020-06-25 PROCEDURE — 85025 COMPLETE CBC W/AUTO DIFF WBC: CPT

## 2020-06-25 PROCEDURE — 99245 OFF/OP CONSLTJ NEW/EST HI 55: CPT | Mod: S$GLB,,, | Performed by: INTERNAL MEDICINE

## 2020-06-25 PROCEDURE — 80053 COMPREHEN METABOLIC PANEL: CPT

## 2020-06-25 NOTE — PROGRESS NOTES
Subjective:   Date of Visit: 6/25/20   ?   ?    REFERRING PROVIDER: Deloris Aiken MD  [address] [place] [address] [place] [id number]   ?   CHIEF COMPLAINT:  Anemia???????   ?   HPI:  50-year-old  female with history of anxiety, hypertension, LA grade C esophagitis, morbid obesity was referred to us by Dr. Aiken for evaluation and management of persisting anemia.    Review of medical record indicate persistent anemia since 2009.  Iron studies performed in October of 2019 showed low ferritin level at 26. Patient denies prior gastric bypass surgery although she had a gastric banding which was later removed per patient, she denies abnormal bleeding including epistaxis, hemoptysis, hematemesis, hematochezia, melena or hematuria.    She endorses increased shortness of breath with minimal exertion as well as fatigue and weakness.  Denies chest pain, nausea vomiting, unintentional weight loss, abdominal pain, diarrhea or dysuria.  Menstrual cycle remains regular and normal per patient lasting anywhere between 3-5 days.    Most recent upper GI endoscopy done on 12/23/2019 showed grade B reflux esophagitis, gastritis and normal examined duodenum.  No lower endoscopy evaluation has been done per patient.    Review of Systems   Constitutional: Positive for fatigue. Negative for activity change, appetite change, chills, fever and unexpected weight change.   HENT: Negative for hearing loss, mouth sores, nosebleeds, sore throat, tinnitus, trouble swallowing and voice change.    Eyes: Negative for visual disturbance.   Respiratory: Positive for shortness of breath. Negative for cough and chest tightness.    Cardiovascular: Negative for chest pain, palpitations and leg swelling.   Gastrointestinal: Negative for abdominal pain, anal bleeding, blood in stool, constipation, diarrhea, nausea and vomiting.   Genitourinary: Negative for dysuria, frequency, hematuria, pelvic pain, vaginal bleeding and vaginal  pain.   Musculoskeletal: Negative for arthralgias, back pain, joint swelling and neck pain.   Skin: Negative for color change, pallor, rash and wound.   Allergic/Immunologic: Negative for immunocompromised state.   Neurological: Positive for weakness. Negative for dizziness, tremors, syncope, speech difficulty, light-headedness and headaches.   Hematological: Negative for adenopathy. Does not bruise/bleed easily.   Psychiatric/Behavioral: Negative for agitation, confusion, decreased concentration, hallucinations and sleep disturbance. The patient is not nervous/anxious.        ?   PAST MEDICAL HISTORY:   Past Medical History:   Diagnosis Date    Acid reflux     Anxiety     Arthritis     Carpal tunnel syndrome of right wrist     Cholelithiases     Hypertension     Iron deficiency     Morbid obesity     ?     PAST SURGICAL HISTORY:   Past Surgical History:   Procedure Laterality Date    CHOLECYSTECTOMY      ESOPHAGOGASTRODUODENOSCOPY N/A 10/14/2019    Procedure: ESOPHAGOGASTRODUODENOSCOPY (EGD);  Surgeon: Stefanie Monique MD;  Location: [facility];  Service: Endoscopy;  Laterality: N/A;    ESOPHAGOGASTRODUODENOSCOPY N/A 12/23/2019    Procedure: EGD (ESOPHAGOGASTRODUODENOSCOPY);  Surgeon: Stefanie Monique MD;  Location: [facility];  Service: Endoscopy;  Laterality: N/A;    HERNIA REPAIR      LAPAROSCOPIC GASTRIC BANDING  10/31/2017    removed    REFRACTIVE SURGERY      TUBAL LIGATION        ?   ALLERGIES:   Allergies as of 06/25/2020 - Reviewed 06/25/2020   Allergen Reaction Noted    Adhesive Swelling 08/15/2013    Lisinopril Other (See Comments) 02/14/2017    Latex, natural rubber Swelling 06/10/2015      ?   MEDICATIONS:?   Outpatient Medications Marked as Taking for the 6/25/20 encounter (Office Visit) with Jose Alberto Lazar MD   Medication Sig Dispense Refill    buPROPion (WELLBUTRIN) 75 MG tablet TAKE 1 TABLET (75 MG TOTAL) BY MOUTH 2 (TWO) TIMES DAILY. OVERDUE FOR ANNUAL//LAST RF 60  tablet 5    cyclobenzaprine (FLEXERIL) 10 MG tablet Take 1 tablet (10 mg total) by mouth 3 (three) times daily as needed for Muscle spasms. Do not drive or operate heavy machinery. 30 tablet 0    ferrous sulfate 325 (65 FE) MG EC tablet Take 1 tablet (325 mg total) by mouth 3 (three) times daily with meals. 90 tablet 0    metoprolol succinate (TOPROL-XL) 50 MG 24 hr tablet Take 1 tablet (50 mg total) by mouth once daily. 90 tablet 1    pantoprazole (PROTONIX) 40 MG tablet TAKE 1 TABLET BY MOUTH EVERY DAY 90 tablet 1      ?   SOCIAL HISTORY:?   Social History     Tobacco Use    Smoking status: Never Smoker    Smokeless tobacco: Never Used   Substance Use Topics    Alcohol use: Yes     Frequency: Monthly or less     Drinks per session: 1 or 2     Binge frequency: Never     Comment: occasionally        ?   FAMILY HISTORY:   family history includes Breast cancer in her mother; Cancer (age of onset: 49) in her mother; Diabetes in her mother; Heart disease in her father and mother; Obesity in her father, maternal grandmother, mother, sister, and sister; Stroke in her mother.   ?     Objective:      Physical Exam  Constitutional:       General: She is not in acute distress.     Appearance: She is well-developed. She is not ill-appearing or toxic-appearing.      Comments: Obese   HENT:      Head: Normocephalic and atraumatic.      Mouth/Throat:      Pharynx: No oropharyngeal exudate.   Eyes:      General: No scleral icterus.        Right eye: No discharge.         Left eye: No discharge.      Conjunctiva/sclera: Conjunctivae normal.      Pupils: Pupils are equal, round, and reactive to light.   Neck:      Musculoskeletal: Normal range of motion and neck supple.      Thyroid: No thyromegaly.   Cardiovascular:      Rate and Rhythm: Normal rate and regular rhythm.      Heart sounds: No murmur.   Pulmonary:      Effort: Pulmonary effort is normal. No respiratory distress.      Breath sounds: Normal breath sounds.    Chest:      Chest wall: No tenderness.   Abdominal:      General: Bowel sounds are normal. There is no distension.      Palpations: Abdomen is soft. There is no mass.      Tenderness: There is no abdominal tenderness. There is no guarding or rebound.   Musculoskeletal: Normal range of motion.         General: No tenderness.   Lymphadenopathy:      Cervical: No cervical adenopathy.      Right cervical: No superficial cervical adenopathy.     Left cervical: No superficial cervical adenopathy.      Upper Body:      Right upper body: No supraclavicular or pectoral adenopathy.      Left upper body: No supraclavicular or pectoral adenopathy.   Skin:     General: Skin is warm and dry.      Capillary Refill: Capillary refill takes 2 to 3 seconds.      Coloration: Skin is not pale.      Findings: No erythema or rash.   Neurological:      Mental Status: She is alert and oriented to person, place, and time.      Cranial Nerves: No cranial nerve deficit.      Sensory: No sensory deficit.   Psychiatric:         Behavior: Behavior normal. Behavior is cooperative.         Judgment: Judgment normal.         ?   Vitals:    06/25/20 1421   BP: 126/86   Pulse: 101   Resp: 18   Temp: 98.3 °F (36.8 °C)      ?     ECOG SCORE    1 - Restricted in strenuous activity-ambulatory and able to carry out work of a light nature       ?   Laboratory:  ?   No visits with results within 1 Day(s) from this visit.   Latest known visit with results is:   Lab Visit on 06/17/2020   Component Date Value Ref Range Status    WBC 06/17/2020 9.07  3.90 - 12.70 K/uL Final    RBC 06/17/2020 3.78* 4.00 - 5.40 M/uL Final    Hemoglobin 06/17/2020 9.5* 12.0 - 16.0 g/dL Final    Hematocrit 06/17/2020 32.8* 37.0 - 48.5 % Final    Mean Corpuscular Volume 06/17/2020 87  82 - 98 fL Final    Mean Corpuscular Hemoglobin 06/17/2020 25.1* 27.0 - 31.0 pg Final    Mean Corpuscular Hemoglobin Conc 06/17/2020 29.0* 32.0 - 36.0 g/dL Final    RDW 06/17/2020 16.0* 11.5 -  14.5 % Final    Platelets 06/17/2020 365* 150 - 350 K/uL Final    MPV 06/17/2020 11.0  9.2 - 12.9 fL Final    Immature Granulocytes 06/17/2020 0.3  0.0 - 0.5 % Final    Gran # (ANC) 06/17/2020 5.3  1.8 - 7.7 K/uL Final    Immature Grans (Abs) 06/17/2020 0.03  0.00 - 0.04 K/uL Final    Lymph # 06/17/2020 2.8  1.0 - 4.8 K/uL Final    Mono # 06/17/2020 0.5  0.3 - 1.0 K/uL Final    Eos # 06/17/2020 0.4  0.0 - 0.5 K/uL Final    Baso # 06/17/2020 0.08  0.00 - 0.20 K/uL Final    nRBC 06/17/2020 0  0 /100 WBC Final    Gran% 06/17/2020 58.4  38.0 - 73.0 % Final    Lymph% 06/17/2020 30.7  18.0 - 48.0 % Final    Mono% 06/17/2020 5.7  4.0 - 15.0 % Final    Eosinophil% 06/17/2020 4.0  0.0 - 8.0 % Final    Basophil% 06/17/2020 0.9  0.0 - 1.9 % Final    Differential Method 06/17/2020 Automated   Final    Sodium 06/17/2020 141  136 - 145 mmol/L Final    Potassium 06/17/2020 4.3  3.5 - 5.1 mmol/L Final    Chloride 06/17/2020 107  95 - 110 mmol/L Final    CO2 06/17/2020 27  23 - 29 mmol/L Final    Glucose 06/17/2020 71  70 - 110 mg/dL Final    BUN, Bld 06/17/2020 8  6 - 20 mg/dL Final    Creatinine 06/17/2020 0.9  0.5 - 1.4 mg/dL Final    Calcium 06/17/2020 8.9  8.7 - 10.5 mg/dL Final    Total Protein 06/17/2020 7.3  6.0 - 8.4 g/dL Final    Albumin 06/17/2020 3.0* 3.5 - 5.2 g/dL Final    Total Bilirubin 06/17/2020 0.3  0.1 - 1.0 mg/dL Final    Alkaline Phosphatase 06/17/2020 68  55 - 135 U/L Final    AST 06/17/2020 11  10 - 40 U/L Final    ALT 06/17/2020 7* 10 - 44 U/L Final    Anion Gap 06/17/2020 7* 8 - 16 mmol/L Final    eGFR if African American 06/17/2020 >60.0  >60 mL/min/1.73 m^2 Final    eGFR if non African American 06/17/2020 >60.0  >60 mL/min/1.73 m^2 Final    Cholesterol 06/17/2020 166  120 - 199 mg/dL Final    Triglycerides 06/17/2020 69  30 - 150 mg/dL Final    HDL 06/17/2020 52  40 - 75 mg/dL Final    LDL Cholesterol 06/17/2020 100.2  63.0 - 159.0 mg/dL Final    Hdl/Cholesterol Ratio  06/17/2020 31.3  20.0 - 50.0 % Final    Total Cholesterol/HDL Ratio 06/17/2020 3.2  2.0 - 5.0 Final    Non-HDL Cholesterol 06/17/2020 114  mg/dL Final    TSH 06/17/2020 0.872  0.400 - 4.000 uIU/mL Final      ?   Tumor markers   ?   ?   Imaging: Mammo Digital Screening Bilat w/ Lemuel  Narrative: Result:  Mammo Digital Screening Bilat w/ Lemuel    History:  Patient is 49 y.o. and is seen for a screening mammogram.    Films Compared:  Compared to: 10/12/2018 Mammo Digital Screening Bilat with   Tomosynthesis_CAD and 10/10/2017 Mammo Digital Screening Bilat with CAD    Findings:  Computer-aided detection was utilized in the interpretation of this   examination. This procedure was performed using tomosynthesis.       The breasts are heterogeneously dense, which may obscure small masses.   There is no evidence of suspicious masses, microcalcifications or   architectural distortion.  Impression: No mammographic evidence of malignancy.    BI-RADS Category 1: Negative    Recommendation:  Routine screening mammogram in 1 year is recommended.     Your estimated lifetime risk of breast cancer (to age 85) based on   Tyrer-Cuzick risk assessment model is Tyrer-Cuzick: 24.83 %. According to   the American Cancer Society, patients with a lifetime breast cancer risk   of 20% or higher might benefit from supplemental screening tests. ??      ?      Pathology:  Pathology Results  (Last 10 years)    None           ?   Assessment/Plan:       1. Iron deficiency anemia, unspecified iron deficiency anemia type    2. Essential hypertension    3. Morbid obesity with BMI of 50.0-59.9, adult          ?Iron deficiency anemia  Normocytic anemia, multifactorial including iron deficiency versus anemia due to chronic inflammation.  Recent blood work showed persisting normocytic anemia with hemoglobin at 9.5 grams/deciliter.  Thyroid function test was noted to be normal.  Will obtain folate and B12 levels.  Will also order iron studies to determine  benefit for IV iron therapy.    Reviewed recent upper endoscopy performed in December of 2019 that was unremarkable.  Based on the iron studies will may request for screening colonoscopy.  Patient is to return back in 1 week with repeat labs or sooner if needed.    Essential hypertension  Management per    Morbid obesity with BMI of 50.0-59.9, adult  Counseled on lifestyle modification including diet and exercise.     ?   ?   Follow-Up: Follow up in about 1 week (around 7/2/2020).    BRYANNA BECKWITH Md., Ph.D  Hematology & Oncology Department  Phone #: [phone]

## 2020-06-25 NOTE — ASSESSMENT & PLAN NOTE
Normocytic anemia, multifactorial including iron deficiency versus anemia due to chronic inflammation.  Recent blood work showed persisting normocytic anemia with hemoglobin at 9.5 grams/deciliter.  Thyroid function test was noted to be normal.  Will obtain folate and B12 levels.  Will also order iron studies to determine benefit for IV iron therapy.    Reviewed recent upper endoscopy performed in December of 2019 that was unremarkable.  Based on the iron studies will may request for screening colonoscopy.  Patient is to return back in 1 week with repeat labs or sooner if needed.

## 2020-06-25 NOTE — LETTER
June 25, 2020      Deloris Aiken MD  [address] [place] [address] [address] [place] [place] [address]           [facility]  [address] [facility]  [place] [address]  Phone: [phone]  Fax: [phone]          Patient: Mary Russo   MR Number: [id number]   YOB: 1970   Date of Visit: 6/25/2020       Dear Dr. Deloris Aiken:    Thank you for referring Mary Russo to me for evaluation. Attached you will find relevant portions of my assessment and plan of care.    If you have questions, please do not hesitate to call me. I look forward to following Mary Russo along with you.    Sincerely,    Jose Alberto Lazar MD    Enclosure  CC:  No Recipients    If you would like to receive this communication electronically, please contact [email or web address] or [phone] to request more information on [employer] access.    For providers and/or their staff who would like to refer a patient to [facility], please contact us through our one-stop-shop provider referral line, [facility], at [phone].    If you feel you have received this communication in error or would no longer like to receive these types of communications, please e-mail [email or web address]

## 2020-06-26 LAB
FOLATE SERPL-MCNC: 16 NG/ML (ref 4–24)
VIT B12 SERPL-MCNC: 619 PG/ML (ref 210–950)

## 2020-07-02 ENCOUNTER — OFFICE VISIT (OUTPATIENT)
Dept: HEMATOLOGY/ONCOLOGY | Facility: CLINIC | Age: 50
End: 2020-07-02
Payer: COMMERCIAL

## 2020-07-02 DIAGNOSIS — D50.9 IRON DEFICIENCY ANEMIA, UNSPECIFIED IRON DEFICIENCY ANEMIA TYPE: Primary | ICD-10-CM

## 2020-07-02 DIAGNOSIS — I10 ESSENTIAL HYPERTENSION: ICD-10-CM

## 2020-07-02 DIAGNOSIS — E66.01 MORBID OBESITY WITH BMI OF 50.0-59.9, ADULT: ICD-10-CM

## 2020-07-02 PROCEDURE — 99215 OFFICE O/P EST HI 40 MIN: CPT | Mod: 95,,, | Performed by: INTERNAL MEDICINE

## 2020-07-02 PROCEDURE — 99215 PR OFFICE/OUTPT VISIT, EST, LEVL V, 40-54 MIN: ICD-10-PCS | Mod: 95,,, | Performed by: INTERNAL MEDICINE

## 2020-07-02 RX ORDER — HEPARIN 100 UNIT/ML
500 SYRINGE INTRAVENOUS
Status: CANCELLED | OUTPATIENT
Start: 2020-08-13

## 2020-07-02 RX ORDER — SODIUM CHLORIDE 0.9 % (FLUSH) 0.9 %
10 SYRINGE (ML) INJECTION
Status: CANCELLED | OUTPATIENT
Start: 2020-08-13

## 2020-07-02 RX ORDER — SODIUM CHLORIDE 0.9 % (FLUSH) 0.9 %
10 SYRINGE (ML) INJECTION
Status: CANCELLED | OUTPATIENT
Start: 2020-07-09

## 2020-07-02 RX ORDER — HEPARIN 100 UNIT/ML
500 SYRINGE INTRAVENOUS
Status: CANCELLED | OUTPATIENT
Start: 2020-07-09

## 2020-07-02 NOTE — ASSESSMENT & PLAN NOTE
Normocytic anemia is multifactorial including chronic inflammation from hypertension, obesity as well as iron deficiency.  Recent iron studies show evidence of iron deficiency with low iron saturation and low iron storage capacity.  Will plan to infuse 2 doses of IV iron and recheck at the completion of therapy.    Reviewed recent upper GI endoscopy done in December of 2019 that showed grade B reflux esophagitis, gastritis and normal examined duodenum.  Will plan on seeing her back in 4 weeks with repeat labs.

## 2020-07-02 NOTE — PROGRESS NOTES
Subjective:   Date of Visit: 7/2/20   ?   ?    REFERRING PROVIDER: No referring provider defined for this encounter.   ?   CHIEF COMPLAINT:  Anemia???????   ?   HPI:  50-year-old  female with history of anxiety, hypertension, LA grade C esophagitis, morbid obesity was referred to us by Dr. Aiken for evaluation and management of persisting anemia.    Review of medical record indicate persistent anemia since 2009.  Iron studies performed in October of 2019 showed low ferritin level at 26. Patient denies prior gastric bypass surgery although she had a gastric banding which was later removed per patient.    She was seen by myself on 06/25/2020 and at that time we initiated workup for anemia.  She joins through tele visit to discuss results.    Continues to complain of shortness of breath with minimal exertion and fatigue.  Denies abnormal bleed including epistaxis, hemoptysis, hematemesis, hematochezia, melena or hematuria.    Menstrual cycle remains regular and normal per patient lasting anywhere between 3-5 days.    Most recent upper GI endoscopy done on 12/23/2019 showed grade B reflux esophagitis, gastritis and normal examined duodenum.  No lower endoscopy evaluation has been done per patient.    Review of Systems   Constitutional: Positive for fatigue. Negative for activity change, appetite change, chills, fever and unexpected weight change.   HENT: Negative for hearing loss, mouth sores, nosebleeds, sore throat, tinnitus, trouble swallowing and voice change.    Eyes: Negative for visual disturbance.   Respiratory: Positive for shortness of breath. Negative for cough and chest tightness.    Cardiovascular: Negative for chest pain, palpitations and leg swelling.   Gastrointestinal: Negative for abdominal pain, anal bleeding, blood in stool, constipation, diarrhea, nausea and vomiting.   Genitourinary: Negative for dysuria, frequency, hematuria, pelvic pain, vaginal bleeding and vaginal pain.    Musculoskeletal: Negative for arthralgias, back pain, joint swelling and neck pain.   Skin: Negative for color change, pallor, rash and wound.   Allergic/Immunologic: Negative for immunocompromised state.   Neurological: Positive for weakness. Negative for dizziness, tremors, syncope, speech difficulty, light-headedness and headaches.   Hematological: Negative for adenopathy. Does not bruise/bleed easily.   Psychiatric/Behavioral: Negative for agitation, confusion, decreased concentration, hallucinations and sleep disturbance. The patient is not nervous/anxious.        ?   PAST MEDICAL HISTORY:   Past Medical History:   Diagnosis Date    Acid reflux     Anxiety     Arthritis     Carpal tunnel syndrome of right wrist     Cholelithiases     Hypertension     Iron deficiency     Morbid obesity     ?     PAST SURGICAL HISTORY:   Past Surgical History:   Procedure Laterality Date    CHOLECYSTECTOMY      ESOPHAGOGASTRODUODENOSCOPY N/A 10/14/2019    Procedure: ESOPHAGOGASTRODUODENOSCOPY (EGD);  Surgeon: Stefanie Monique MD;  Location: [facility];  Service: Endoscopy;  Laterality: N/A;    ESOPHAGOGASTRODUODENOSCOPY N/A 12/23/2019    Procedure: EGD (ESOPHAGOGASTRODUODENOSCOPY);  Surgeon: Stefanie Monique MD;  Location: [facility];  Service: Endoscopy;  Laterality: N/A;    HERNIA REPAIR      LAPAROSCOPIC GASTRIC BANDING  10/31/2017    removed    REFRACTIVE SURGERY      TUBAL LIGATION        ?   ALLERGIES:   Allergies as of 07/02/2020 - Reviewed 06/25/2020   Allergen Reaction Noted    Adhesive Swelling 08/15/2013    Lisinopril Other (See Comments) 02/14/2017    Latex, natural rubber Swelling 06/10/2015      ?   MEDICATIONS:?   No outpatient medications have been marked as taking for the 7/2/20 encounter (Office Visit) with Jose Alberto Lazar MD.      ?   SOCIAL HISTORY:?   Social History     Tobacco Use    Smoking status: Never Smoker    Smokeless tobacco: Never Used   Substance Use Topics    Alcohol  use: Yes     Frequency: Monthly or less     Drinks per session: 1 or 2     Binge frequency: Never     Comment: occasionally        ?   FAMILY HISTORY:   family history includes Breast cancer in her mother; Cancer (age of onset: 49) in her mother; Diabetes in her mother; Heart disease in her father and mother; Obesity in her father, maternal grandmother, mother, sister, and sister; Stroke in her mother.   ?     Objective:      Physical exam:  Unable to perform  ?   There were no vitals filed for this visit.   ?     ECOG SCORE         ?   Laboratory:  ?   No visits with results within 1 Day(s) from this visit.   Latest known visit with results is:   Lab Visit on 06/25/2020   Component Date Value Ref Range Status    WBC 06/25/2020 10.21  3.90 - 12.70 K/uL Final    RBC 06/25/2020 4.06  4.00 - 5.40 M/uL Final    Hemoglobin 06/25/2020 10.2* 12.0 - 16.0 g/dL Final    Hematocrit 06/25/2020 34.6* 37.0 - 48.5 % Final    Mean Corpuscular Volume 06/25/2020 85  82 - 98 fL Final    Mean Corpuscular Hemoglobin 06/25/2020 25.1* 27.0 - 31.0 pg Final    Mean Corpuscular Hemoglobin Conc 06/25/2020 29.5* 32.0 - 36.0 g/dL Final    RDW 06/25/2020 15.9* 11.5 - 14.5 % Final    Platelets 06/25/2020 379* 150 - 350 K/uL Final    MPV 06/25/2020 10.1  9.2 - 12.9 fL Final    Immature Granulocytes 06/25/2020 0.3  0.0 - 0.5 % Final    Gran # (ANC) 06/25/2020 6.3  1.8 - 7.7 K/uL Final    Immature Grans (Abs) 06/25/2020 0.03  0.00 - 0.04 K/uL Final    Lymph # 06/25/2020 2.8  1.0 - 4.8 K/uL Final    Mono # 06/25/2020 0.7  0.3 - 1.0 K/uL Final    Eos # 06/25/2020 0.4  0.0 - 0.5 K/uL Final    Baso # 06/25/2020 0.09  0.00 - 0.20 K/uL Final    nRBC 06/25/2020 0  0 /100 WBC Final    Gran% 06/25/2020 61.4  38.0 - 73.0 % Final    Lymph% 06/25/2020 27.3  18.0 - 48.0 % Final    Mono% 06/25/2020 6.8  4.0 - 15.0 % Final    Eosinophil% 06/25/2020 3.6  0.0 - 8.0 % Final    Basophil% 06/25/2020 0.9  0.0 - 1.9 % Final    Differential Method  06/25/2020 Automated   Final    Sodium 06/25/2020 139  136 - 145 mmol/L Final    Potassium 06/25/2020 3.7  3.5 - 5.1 mmol/L Final    Chloride 06/25/2020 104  95 - 110 mmol/L Final    CO2 06/25/2020 28  23 - 29 mmol/L Final    Glucose 06/25/2020 87  70 - 110 mg/dL Final    BUN, Bld 06/25/2020 11  6 - 20 mg/dL Final    Creatinine 06/25/2020 0.9  0.5 - 1.4 mg/dL Final    Calcium 06/25/2020 9.0  8.7 - 10.5 mg/dL Final    Total Protein 06/25/2020 7.8  6.0 - 8.4 g/dL Final    Albumin 06/25/2020 3.1* 3.5 - 5.2 g/dL Final    Total Bilirubin 06/25/2020 0.3  0.1 - 1.0 mg/dL Final    Alkaline Phosphatase 06/25/2020 70  55 - 135 U/L Final    AST 06/25/2020 12  10 - 40 U/L Final    ALT 06/25/2020 5* 10 - 44 U/L Final    Anion Gap 06/25/2020 7* 8 - 16 mmol/L Final    eGFR if African American 06/25/2020 >60  >60 mL/min/1.73 m^2 Final    eGFR if non African American 06/25/2020 >60  >60 mL/min/1.73 m^2 Final    Iron 06/25/2020 25* 30 - 160 ug/dL Final    Transferrin 06/25/2020 285  200 - 375 mg/dL Final    TIBC 06/25/2020 422  250 - 450 ug/dL Final    Saturated Iron 06/25/2020 6* 20 - 50 % Final    Ferritin 06/25/2020 27  20.0 - 300.0 ng/mL Final    Vitamin B-12 06/25/2020 619  210 - 950 pg/mL Final    Folate 06/25/2020 16.0  4.0 - 24.0 ng/mL Final      ?   Tumor markers   ?   ?   Imaging: Mammo Digital Screening Bilat w/ Lemuel  Narrative: Result:  Mammo Digital Screening Bilat w/ Lemuel    History:  Patient is 49 y.o. and is seen for a screening mammogram.    Films Compared:  Compared to: 10/12/2018 Mammo Digital Screening Bilat with   Tomosynthesis_CAD and 10/10/2017 Mammo Digital Screening Bilat with CAD    Findings:  Computer-aided detection was utilized in the interpretation of this   examination. This procedure was performed using tomosynthesis.       The breasts are heterogeneously dense, which may obscure small masses.   There is no evidence of suspicious masses, microcalcifications or   architectural  distortion.  Impression: No mammographic evidence of malignancy.    BI-RADS Category 1: Negative    Recommendation:  Routine screening mammogram in 1 year is recommended.     Your estimated lifetime risk of breast cancer (to age 85) based on   Tyrer-Cuzick risk assessment model is Tyrer-Cuzick: 24.83 %. According to   the American Cancer Society, patients with a lifetime breast cancer risk   of 20% or higher might benefit from supplemental screening tests. ??      ?    Pathology:  Pathology Results  (Last 10 years)    None           ?   Assessment/Plan:       1. Iron deficiency anemia, unspecified iron deficiency anemia type    2. Essential hypertension    3. Morbid obesity with BMI of 50.0-59.9, adult          ?Iron deficiency anemia  Normocytic anemia is multifactorial including chronic inflammation from hypertension, obesity as well as iron deficiency.  Recent iron studies show evidence of iron deficiency with low iron saturation and low iron storage capacity.  Will plan to infuse 2 doses of IV iron and recheck at the completion of therapy.    Reviewed recent upper GI endoscopy done in December of 2019 that showed grade B reflux esophagitis, gastritis and normal examined duodenum.  Will plan on seeing her back in 4 weeks with repeat labs.    Essential hypertension  Management per PCP.  On beta-blocker.    Morbid obesity with BMI of 50.0-59.9, adult  Continue to encourage exercise and diet modifications.     ?   ?   Follow-Up: Follow up in about 4 weeks (around 7/30/2020).    Consult Start Time: 07/02/2020 13:40  Consult End Time: 07/02/2020 14:00        The patient location is:  home  The chief complaint leading to consultation is:  Anemia    Visit type: audiovisual    Face to Face time with patient:  20 min  Twenty minutes of total time spent on the encounter, which includes face to face time and non-face to face time preparing to see the patient (eg, review of tests), Obtaining and/or reviewing separately  obtained history, Documenting clinical information in the electronic or other health record, Independently interpreting results (not separately reported) and communicating results to the patient/family/caregiver, or Care coordination (not separately reported).         Each patient to whom he or she provides medical services by telemedicine is:  (1) informed of the relationship between the physician and patient and the respective role of any other health care provider with respect to management of the patient; and (2) notified that he or she may decline to receive medical services by telemedicine and may withdraw from such care at any time.      BRYANNA BECKWITH Md., Ph.D  Hematology & Oncology Department  Phone #: [phone]

## 2020-07-15 DIAGNOSIS — D50.9 IRON DEFICIENCY ANEMIA, UNSPECIFIED IRON DEFICIENCY ANEMIA TYPE: ICD-10-CM

## 2020-07-20 RX ORDER — FERROUS SULFATE 325(65) MG
TABLET, DELAYED RELEASE (ENTERIC COATED) ORAL
Qty: 90 TABLET | Refills: 5 | Status: SHIPPED | OUTPATIENT
Start: 2020-07-20 | End: 2021-06-24

## 2020-08-12 ENCOUNTER — INFUSION (OUTPATIENT)
Dept: INFUSION THERAPY | Facility: HOSPITAL | Age: 50
End: 2020-08-12
Payer: COMMERCIAL

## 2020-08-12 VITALS
OXYGEN SATURATION: 100 % | BODY MASS INDEX: 47.09 KG/M2 | HEIGHT: 66 IN | HEART RATE: 86 BPM | WEIGHT: 293 LBS | DIASTOLIC BLOOD PRESSURE: 73 MMHG | SYSTOLIC BLOOD PRESSURE: 117 MMHG | TEMPERATURE: 98 F | RESPIRATION RATE: 20 BRPM

## 2020-08-12 DIAGNOSIS — D50.9 IRON DEFICIENCY ANEMIA, UNSPECIFIED IRON DEFICIENCY ANEMIA TYPE: Primary | ICD-10-CM

## 2020-08-12 PROCEDURE — A4216 STERILE WATER/SALINE, 10 ML: HCPCS | Performed by: INTERNAL MEDICINE

## 2020-08-12 PROCEDURE — 25000003 PHARM REV CODE 250: Performed by: INTERNAL MEDICINE

## 2020-08-12 PROCEDURE — 63600175 PHARM REV CODE 636 W HCPCS: Mod: JG | Performed by: INTERNAL MEDICINE

## 2020-08-12 PROCEDURE — 96365 THER/PROPH/DIAG IV INF INIT: CPT

## 2020-08-12 RX ORDER — SODIUM CHLORIDE 0.9 % (FLUSH) 0.9 %
10 SYRINGE (ML) INJECTION
Status: DISCONTINUED | OUTPATIENT
Start: 2020-08-12 | End: 2020-08-12 | Stop reason: HOSPADM

## 2020-08-12 RX ADMIN — FERRIC CARBOXYMALTOSE INJECTION 750 MG: 50 INJECTION, SOLUTION INTRAVENOUS at 01:08

## 2020-08-12 RX ADMIN — Medication 10 ML: at 01:08

## 2020-08-12 NOTE — DISCHARGE INSTRUCTIONS
[facility]  [address] [address]  [address]  [phone] phone     [phone] fax  Hours of Operation: Monday- Friday 8:00am- 5:00pm  After hours phone  [phone]  Hematology / Oncology Physicians on call      STEPHANIE Pinto Dr., Dr., Dr., Dr., NP Sydney Prescott, NP Tyesha Taylor, NP    Please call with any concerns regarding your appointment today.

## 2020-08-19 ENCOUNTER — INFUSION (OUTPATIENT)
Dept: INFUSION THERAPY | Facility: HOSPITAL | Age: 50
End: 2020-08-19
Payer: COMMERCIAL

## 2020-08-19 VITALS
HEART RATE: 84 BPM | BODY MASS INDEX: 47.09 KG/M2 | RESPIRATION RATE: 18 BRPM | OXYGEN SATURATION: 98 % | HEIGHT: 66 IN | SYSTOLIC BLOOD PRESSURE: 142 MMHG | TEMPERATURE: 98 F | DIASTOLIC BLOOD PRESSURE: 90 MMHG | WEIGHT: 293 LBS

## 2020-08-19 DIAGNOSIS — D50.9 IRON DEFICIENCY ANEMIA, UNSPECIFIED IRON DEFICIENCY ANEMIA TYPE: Primary | ICD-10-CM

## 2020-08-19 PROCEDURE — 96365 THER/PROPH/DIAG IV INF INIT: CPT

## 2020-08-19 PROCEDURE — 63600175 PHARM REV CODE 636 W HCPCS: Mod: JG | Performed by: INTERNAL MEDICINE

## 2020-08-19 PROCEDURE — 25000003 PHARM REV CODE 250: Performed by: INTERNAL MEDICINE

## 2020-08-19 RX ADMIN — FERRIC CARBOXYMALTOSE INJECTION 750 MG: 50 INJECTION, SOLUTION INTRAVENOUS at 01:08

## 2020-09-24 ENCOUNTER — TELEPHONE (OUTPATIENT)
Dept: INTERNAL MEDICINE | Facility: CLINIC | Age: 50
End: 2020-09-24

## 2020-09-24 ENCOUNTER — PATIENT OUTREACH (OUTPATIENT)
Dept: ADMINISTRATIVE | Facility: HOSPITAL | Age: 50
End: 2020-09-24

## 2020-09-24 NOTE — PROGRESS NOTES
Working [employer] report.  Talked to pt on phone stated will check bp when she gets to her sister's house and everytime she comes into the clinic  they have to check her bp twice and it's elevated. Pt will enter bp reading in portal, entered myc9 order.

## 2020-10-09 DIAGNOSIS — F32.A ANXIETY AND DEPRESSION: ICD-10-CM

## 2020-10-09 DIAGNOSIS — F41.9 ANXIETY AND DEPRESSION: ICD-10-CM

## 2020-10-09 RX ORDER — BUPROPION HYDROCHLORIDE 75 MG/1
75 TABLET ORAL 2 TIMES DAILY
Qty: 60 TABLET | Refills: 9 | Status: SHIPPED | OUTPATIENT
Start: 2020-10-09 | End: 2021-11-03

## 2020-10-09 RX ORDER — BUPROPION HYDROCHLORIDE 75 MG/1
75 TABLET ORAL 2 TIMES DAILY
Qty: 60 TABLET | Refills: 5 | Status: CANCELLED | OUTPATIENT
Start: 2020-10-09

## 2020-10-12 ENCOUNTER — IMMUNIZATION (OUTPATIENT)
Dept: INTERNAL MEDICINE | Facility: CLINIC | Age: 50
End: 2020-10-12
Payer: COMMERCIAL

## 2020-10-12 PROCEDURE — 90686 IIV4 VACC NO PRSV 0.5 ML IM: CPT | Mod: S$GLB,,, | Performed by: FAMILY MEDICINE

## 2020-10-12 PROCEDURE — 90686 FLU VACCINE (QUAD) GREATER THAN OR EQUAL TO 3YO PRESERVATIVE FREE IM: ICD-10-PCS | Mod: S$GLB,,, | Performed by: FAMILY MEDICINE

## 2020-10-12 PROCEDURE — 90471 FLU VACCINE (QUAD) GREATER THAN OR EQUAL TO 3YO PRESERVATIVE FREE IM: ICD-10-PCS | Mod: S$GLB,,, | Performed by: FAMILY MEDICINE

## 2020-10-12 PROCEDURE — 90471 IMMUNIZATION ADMIN: CPT | Mod: S$GLB,,, | Performed by: FAMILY MEDICINE

## 2020-11-17 ENCOUNTER — PATIENT MESSAGE (OUTPATIENT)
Dept: INTERNAL MEDICINE | Facility: CLINIC | Age: 50
End: 2020-11-17

## 2020-11-17 ENCOUNTER — PATIENT MESSAGE (OUTPATIENT)
Dept: HEMATOLOGY/ONCOLOGY | Facility: CLINIC | Age: 50
End: 2020-11-17

## 2020-11-17 DIAGNOSIS — Z12.31 ENCOUNTER FOR SCREENING MAMMOGRAM FOR BREAST CANCER: Primary | ICD-10-CM

## 2020-11-18 ENCOUNTER — TELEPHONE (OUTPATIENT)
Dept: INTERNAL MEDICINE | Facility: CLINIC | Age: 50
End: 2020-11-18

## 2020-12-10 ENCOUNTER — PATIENT MESSAGE (OUTPATIENT)
Dept: INTERNAL MEDICINE | Facility: CLINIC | Age: 50
End: 2020-12-10

## 2020-12-10 ENCOUNTER — PATIENT MESSAGE (OUTPATIENT)
Dept: HEMATOLOGY/ONCOLOGY | Facility: CLINIC | Age: 50
End: 2020-12-10

## 2020-12-14 ENCOUNTER — HOSPITAL ENCOUNTER (OUTPATIENT)
Dept: RADIOLOGY | Facility: HOSPITAL | Age: 50
Discharge: HOME | End: 2020-12-14
Attending: FAMILY MEDICINE
Payer: COMMERCIAL

## 2020-12-14 VITALS — HEIGHT: 66 IN | BODY MASS INDEX: 47.09 KG/M2 | WEIGHT: 293 LBS

## 2020-12-14 DIAGNOSIS — Z12.31 ENCOUNTER FOR SCREENING MAMMOGRAM FOR BREAST CANCER: ICD-10-CM

## 2020-12-14 PROCEDURE — 77067 SCR MAMMO BI INCL CAD: CPT | Mod: TC

## 2020-12-14 PROCEDURE — 77063 BREAST TOMOSYNTHESIS BI: CPT | Mod: 26,,, | Performed by: RADIOLOGY

## 2020-12-14 PROCEDURE — 77067 MAMMO DIGITAL SCREENING BILAT WITH TOMO: ICD-10-PCS | Mod: 26,,, | Performed by: RADIOLOGY

## 2020-12-14 PROCEDURE — 77063 MAMMO DIGITAL SCREENING BILAT WITH TOMO: ICD-10-PCS | Mod: 26,,, | Performed by: RADIOLOGY

## 2020-12-14 PROCEDURE — 77067 SCR MAMMO BI INCL CAD: CPT | Mod: 26,,, | Performed by: RADIOLOGY

## 2021-01-04 ENCOUNTER — PATIENT MESSAGE (OUTPATIENT)
Dept: HEMATOLOGY/ONCOLOGY | Facility: CLINIC | Age: 51
End: 2021-01-04

## 2021-01-11 ENCOUNTER — OFFICE VISIT (OUTPATIENT)
Dept: INTERNAL MEDICINE | Facility: CLINIC | Age: 51
End: 2021-01-11
Payer: COMMERCIAL

## 2021-01-11 DIAGNOSIS — F41.9 ANXIETY AND DEPRESSION: ICD-10-CM

## 2021-01-11 DIAGNOSIS — F32.A ANXIETY AND DEPRESSION: ICD-10-CM

## 2021-01-11 DIAGNOSIS — I10 ESSENTIAL HYPERTENSION: Primary | ICD-10-CM

## 2021-01-11 DIAGNOSIS — M67.471 GANGLION CYST OF RIGHT FOOT: ICD-10-CM

## 2021-01-11 DIAGNOSIS — E66.01 MORBID OBESITY WITH BMI OF 50.0-59.9, ADULT: ICD-10-CM

## 2021-01-11 PROCEDURE — 3079F DIAST BP 80-89 MM HG: CPT | Mod: CPTII,S$GLB,, | Performed by: FAMILY MEDICINE

## 2021-01-11 PROCEDURE — 1125F PR PAIN SEVERITY QUANTIFIED, PAIN PRESENT: ICD-10-PCS | Mod: S$GLB,,, | Performed by: FAMILY MEDICINE

## 2021-01-11 PROCEDURE — 3079F PR MOST RECENT DIASTOLIC BLOOD PRESSURE 80-89 MM HG: ICD-10-PCS | Mod: CPTII,S$GLB,, | Performed by: FAMILY MEDICINE

## 2021-01-11 PROCEDURE — 3008F PR BODY MASS INDEX (BMI) DOCUMENTED: ICD-10-PCS | Mod: CPTII,S$GLB,, | Performed by: FAMILY MEDICINE

## 2021-01-11 PROCEDURE — 3077F SYST BP >= 140 MM HG: CPT | Mod: CPTII,S$GLB,, | Performed by: FAMILY MEDICINE

## 2021-01-11 PROCEDURE — 1125F AMNT PAIN NOTED PAIN PRSNT: CPT | Mod: S$GLB,,, | Performed by: FAMILY MEDICINE

## 2021-01-11 PROCEDURE — 99214 OFFICE O/P EST MOD 30 MIN: CPT | Mod: S$GLB,,, | Performed by: FAMILY MEDICINE

## 2021-01-11 PROCEDURE — 99214 PR OFFICE/OUTPT VISIT, EST, LEVL IV, 30-39 MIN: ICD-10-PCS | Mod: S$GLB,,, | Performed by: FAMILY MEDICINE

## 2021-01-11 PROCEDURE — 3077F PR MOST RECENT SYSTOLIC BLOOD PRESSURE >= 140 MM HG: ICD-10-PCS | Mod: CPTII,S$GLB,, | Performed by: FAMILY MEDICINE

## 2021-01-11 PROCEDURE — 99999 PR PBB SHADOW E&M-EST. PATIENT-LVL IV: ICD-10-PCS | Mod: PBBFAC,,, | Performed by: FAMILY MEDICINE

## 2021-01-11 PROCEDURE — 3008F BODY MASS INDEX DOCD: CPT | Mod: CPTII,S$GLB,, | Performed by: FAMILY MEDICINE

## 2021-01-11 PROCEDURE — 99999 PR PBB SHADOW E&M-EST. PATIENT-LVL IV: CPT | Mod: PBBFAC,,, | Performed by: FAMILY MEDICINE

## 2021-01-11 RX ORDER — METOPROLOL SUCCINATE 50 MG/1
50 TABLET, EXTENDED RELEASE ORAL 2 TIMES DAILY
Qty: 180 TABLET | Refills: 1 | Status: SHIPPED | OUTPATIENT
Start: 2021-01-11 | End: 2021-02-15

## 2021-01-21 ENCOUNTER — PATIENT MESSAGE (OUTPATIENT)
Dept: HEMATOLOGY/ONCOLOGY | Facility: CLINIC | Age: 51
End: 2021-01-21

## 2021-01-30 ENCOUNTER — PATIENT MESSAGE (OUTPATIENT)
Dept: HEMATOLOGY/ONCOLOGY | Facility: CLINIC | Age: 51
End: 2021-01-30

## 2021-02-01 ENCOUNTER — PATIENT MESSAGE (OUTPATIENT)
Dept: HEMATOLOGY/ONCOLOGY | Facility: CLINIC | Age: 51
End: 2021-02-01

## 2021-02-02 ENCOUNTER — PATIENT MESSAGE (OUTPATIENT)
Dept: HEMATOLOGY/ONCOLOGY | Facility: CLINIC | Age: 51
End: 2021-02-02

## 2021-02-02 DIAGNOSIS — D50.9 IRON DEFICIENCY ANEMIA, UNSPECIFIED IRON DEFICIENCY ANEMIA TYPE: Primary | ICD-10-CM

## 2021-02-11 ENCOUNTER — OFFICE VISIT (OUTPATIENT)
Dept: HEMATOLOGY/ONCOLOGY | Facility: CLINIC | Age: 51
End: 2021-02-11
Payer: COMMERCIAL

## 2021-02-11 DIAGNOSIS — D50.9 IRON DEFICIENCY ANEMIA, UNSPECIFIED IRON DEFICIENCY ANEMIA TYPE: Primary | ICD-10-CM

## 2021-02-11 DIAGNOSIS — K20.80 LOS ANGELES GRADE B ESOPHAGITIS: ICD-10-CM

## 2021-02-11 PROCEDURE — 99214 PR OFFICE/OUTPT VISIT, EST, LEVL IV, 30-39 MIN: ICD-10-PCS | Mod: 95,,, | Performed by: NURSE PRACTITIONER

## 2021-02-11 PROCEDURE — 99214 OFFICE O/P EST MOD 30 MIN: CPT | Mod: 95,,, | Performed by: NURSE PRACTITIONER

## 2021-02-14 DIAGNOSIS — I10 ESSENTIAL HYPERTENSION: ICD-10-CM

## 2021-02-16 RX ORDER — METOPROLOL SUCCINATE 50 MG/1
TABLET, EXTENDED RELEASE ORAL
Qty: 90 TABLET | Refills: 0 | Status: SHIPPED | OUTPATIENT
Start: 2021-02-16 | End: 2021-07-12 | Stop reason: SDUPTHER

## 2021-03-09 ENCOUNTER — PATIENT OUTREACH (OUTPATIENT)
Dept: ADMINISTRATIVE | Facility: HOSPITAL | Age: 51
End: 2021-03-09

## 2021-03-12 ENCOUNTER — PATIENT MESSAGE (OUTPATIENT)
Dept: INTERNAL MEDICINE | Facility: CLINIC | Age: 51
End: 2021-03-12

## 2021-03-16 VITALS
HEIGHT: 66 IN | BODY MASS INDEX: 47.09 KG/M2 | DIASTOLIC BLOOD PRESSURE: 83 MMHG | HEART RATE: 110 BPM | WEIGHT: 293 LBS | TEMPERATURE: 98 F | SYSTOLIC BLOOD PRESSURE: 139 MMHG | OXYGEN SATURATION: 96 %

## 2021-04-28 ENCOUNTER — PATIENT MESSAGE (OUTPATIENT)
Dept: RESEARCH | Facility: HOSPITAL | Age: 51
End: 2021-04-28

## 2021-06-24 ENCOUNTER — OFFICE VISIT (OUTPATIENT)
Dept: OBSTETRICS AND GYNECOLOGY | Facility: CLINIC | Age: 51
End: 2021-06-24
Payer: COMMERCIAL

## 2021-06-24 ENCOUNTER — TELEPHONE (OUTPATIENT)
Dept: OBSTETRICS AND GYNECOLOGY | Facility: CLINIC | Age: 51
End: 2021-06-24

## 2021-06-24 VITALS
SYSTOLIC BLOOD PRESSURE: 150 MMHG | WEIGHT: 293 LBS | BODY MASS INDEX: 47.09 KG/M2 | HEIGHT: 66 IN | DIASTOLIC BLOOD PRESSURE: 94 MMHG

## 2021-06-24 DIAGNOSIS — Z01.419 ROUTINE GYNECOLOGICAL EXAMINATION: Primary | ICD-10-CM

## 2021-06-24 DIAGNOSIS — N85.2 ENLARGED UTERUS: ICD-10-CM

## 2021-06-24 DIAGNOSIS — Z12.31 BREAST CANCER SCREENING BY MAMMOGRAM: ICD-10-CM

## 2021-06-24 PROCEDURE — 99396 PR PREVENTIVE VISIT,EST,40-64: ICD-10-PCS | Mod: S$GLB,,, | Performed by: NURSE PRACTITIONER

## 2021-06-24 PROCEDURE — 99396 PREV VISIT EST AGE 40-64: CPT | Mod: S$GLB,,, | Performed by: NURSE PRACTITIONER

## 2021-06-24 PROCEDURE — 99999 PR PBB SHADOW E&M-EST. PATIENT-LVL III: ICD-10-PCS | Mod: PBBFAC,,, | Performed by: NURSE PRACTITIONER

## 2021-06-24 PROCEDURE — 99999 PR PBB SHADOW E&M-EST. PATIENT-LVL III: CPT | Mod: PBBFAC,,, | Performed by: NURSE PRACTITIONER

## 2021-06-24 PROCEDURE — 1126F AMNT PAIN NOTED NONE PRSNT: CPT | Mod: S$GLB,,, | Performed by: NURSE PRACTITIONER

## 2021-06-24 PROCEDURE — 1126F PR PAIN SEVERITY QUANTIFIED, NO PAIN PRESENT: ICD-10-PCS | Mod: S$GLB,,, | Performed by: NURSE PRACTITIONER

## 2021-06-24 PROCEDURE — 3008F BODY MASS INDEX DOCD: CPT | Mod: CPTII,S$GLB,, | Performed by: NURSE PRACTITIONER

## 2021-06-24 PROCEDURE — 3008F PR BODY MASS INDEX (BMI) DOCUMENTED: ICD-10-PCS | Mod: CPTII,S$GLB,, | Performed by: NURSE PRACTITIONER

## 2021-07-12 DIAGNOSIS — I10 ESSENTIAL HYPERTENSION: ICD-10-CM

## 2021-07-14 RX ORDER — METOPROLOL SUCCINATE 50 MG/1
50 TABLET, EXTENDED RELEASE ORAL DAILY
Qty: 90 TABLET | Refills: 0 | Status: SHIPPED | OUTPATIENT
Start: 2021-07-14 | End: 2021-07-20

## 2021-07-16 DIAGNOSIS — I10 ESSENTIAL HYPERTENSION: ICD-10-CM

## 2021-07-20 RX ORDER — METOPROLOL SUCCINATE 50 MG/1
TABLET, EXTENDED RELEASE ORAL
Qty: 180 TABLET | Refills: 0 | Status: SHIPPED | OUTPATIENT
Start: 2021-07-20 | End: 2021-10-10

## 2021-07-29 ENCOUNTER — OFFICE VISIT (OUTPATIENT)
Dept: INTERNAL MEDICINE | Facility: CLINIC | Age: 51
End: 2021-07-29
Payer: COMMERCIAL

## 2021-07-29 ENCOUNTER — HOSPITAL ENCOUNTER (OUTPATIENT)
Dept: RADIOLOGY | Facility: HOSPITAL | Age: 51
Discharge: HOME | End: 2021-07-29
Attending: INTERNAL MEDICINE
Payer: COMMERCIAL

## 2021-07-29 VITALS
OXYGEN SATURATION: 96 % | WEIGHT: 293 LBS | HEIGHT: 66 IN | SYSTOLIC BLOOD PRESSURE: 140 MMHG | TEMPERATURE: 97 F | HEART RATE: 101 BPM | BODY MASS INDEX: 47.09 KG/M2 | DIASTOLIC BLOOD PRESSURE: 86 MMHG

## 2021-07-29 DIAGNOSIS — I10 ESSENTIAL HYPERTENSION: ICD-10-CM

## 2021-07-29 DIAGNOSIS — Z00.00 ANNUAL PHYSICAL EXAM: Primary | ICD-10-CM

## 2021-07-29 DIAGNOSIS — G89.29 CHRONIC PAIN OF BOTH KNEES: ICD-10-CM

## 2021-07-29 DIAGNOSIS — E66.01 MORBID OBESITY WITH BMI OF 50.0-59.9, ADULT: ICD-10-CM

## 2021-07-29 DIAGNOSIS — M25.561 CHRONIC PAIN OF BOTH KNEES: ICD-10-CM

## 2021-07-29 DIAGNOSIS — M25.562 CHRONIC PAIN OF BOTH KNEES: ICD-10-CM

## 2021-07-29 PROCEDURE — 99999 PR PBB SHADOW E&M-EST. PATIENT-LVL IV: ICD-10-PCS | Mod: PBBFAC,,, | Performed by: INTERNAL MEDICINE

## 2021-07-29 PROCEDURE — 1160F PR REVIEW ALL MEDS BY PRESCRIBER/CLIN PHARMACIST DOCUMENTED: ICD-10-PCS | Mod: CPTII,S$GLB,, | Performed by: INTERNAL MEDICINE

## 2021-07-29 PROCEDURE — 1159F MED LIST DOCD IN RCRD: CPT | Mod: CPTII,S$GLB,, | Performed by: INTERNAL MEDICINE

## 2021-07-29 PROCEDURE — 3077F PR MOST RECENT SYSTOLIC BLOOD PRESSURE >= 140 MM HG: ICD-10-PCS | Mod: CPTII,S$GLB,, | Performed by: INTERNAL MEDICINE

## 2021-07-29 PROCEDURE — 1126F AMNT PAIN NOTED NONE PRSNT: CPT | Mod: CPTII,S$GLB,, | Performed by: INTERNAL MEDICINE

## 2021-07-29 PROCEDURE — 73562 X-RAY EXAM OF KNEE 3: CPT | Mod: 26,,, | Performed by: RADIOLOGY

## 2021-07-29 PROCEDURE — 3008F PR BODY MASS INDEX (BMI) DOCUMENTED: ICD-10-PCS | Mod: CPTII,S$GLB,, | Performed by: INTERNAL MEDICINE

## 2021-07-29 PROCEDURE — 1126F PR PAIN SEVERITY QUANTIFIED, NO PAIN PRESENT: ICD-10-PCS | Mod: CPTII,S$GLB,, | Performed by: INTERNAL MEDICINE

## 2021-07-29 PROCEDURE — 73562 X-RAY EXAM OF KNEE 3: CPT | Mod: TC,50

## 2021-07-29 PROCEDURE — 99999 PR PBB SHADOW E&M-EST. PATIENT-LVL IV: CPT | Mod: PBBFAC,,, | Performed by: INTERNAL MEDICINE

## 2021-07-29 PROCEDURE — 3008F BODY MASS INDEX DOCD: CPT | Mod: CPTII,S$GLB,, | Performed by: INTERNAL MEDICINE

## 2021-07-29 PROCEDURE — 73562 XR KNEE ORTHO BILAT: ICD-10-PCS | Mod: 26,,, | Performed by: RADIOLOGY

## 2021-07-29 PROCEDURE — 1159F PR MEDICATION LIST DOCUMENTED IN MEDICAL RECORD: ICD-10-PCS | Mod: CPTII,S$GLB,, | Performed by: INTERNAL MEDICINE

## 2021-07-29 PROCEDURE — 1160F RVW MEDS BY RX/DR IN RCRD: CPT | Mod: CPTII,S$GLB,, | Performed by: INTERNAL MEDICINE

## 2021-07-29 PROCEDURE — 3079F DIAST BP 80-89 MM HG: CPT | Mod: CPTII,S$GLB,, | Performed by: INTERNAL MEDICINE

## 2021-07-29 PROCEDURE — 3077F SYST BP >= 140 MM HG: CPT | Mod: CPTII,S$GLB,, | Performed by: INTERNAL MEDICINE

## 2021-07-29 PROCEDURE — 99396 PR PREVENTIVE VISIT,EST,40-64: ICD-10-PCS | Mod: S$GLB,,, | Performed by: INTERNAL MEDICINE

## 2021-07-29 PROCEDURE — 99396 PREV VISIT EST AGE 40-64: CPT | Mod: S$GLB,,, | Performed by: INTERNAL MEDICINE

## 2021-07-29 PROCEDURE — 3079F PR MOST RECENT DIASTOLIC BLOOD PRESSURE 80-89 MM HG: ICD-10-PCS | Mod: CPTII,S$GLB,, | Performed by: INTERNAL MEDICINE

## 2021-07-29 RX ORDER — AMLODIPINE BESYLATE 5 MG/1
5 TABLET ORAL DAILY
Qty: 30 TABLET | Refills: 1 | Status: SHIPPED | OUTPATIENT
Start: 2021-07-29 | End: 2021-08-24

## 2021-07-30 ENCOUNTER — PATIENT MESSAGE (OUTPATIENT)
Dept: INTERNAL MEDICINE | Facility: CLINIC | Age: 51
End: 2021-07-30

## 2021-07-30 ENCOUNTER — TELEPHONE (OUTPATIENT)
Dept: INTERNAL MEDICINE | Facility: CLINIC | Age: 51
End: 2021-07-30

## 2021-07-30 DIAGNOSIS — M25.562 CHRONIC PAIN OF BOTH KNEES: Primary | ICD-10-CM

## 2021-07-30 DIAGNOSIS — G89.29 CHRONIC PAIN OF BOTH KNEES: Primary | ICD-10-CM

## 2021-07-30 DIAGNOSIS — M25.561 CHRONIC PAIN OF BOTH KNEES: Primary | ICD-10-CM

## 2021-08-01 ENCOUNTER — PATIENT MESSAGE (OUTPATIENT)
Dept: INTERNAL MEDICINE | Facility: CLINIC | Age: 51
End: 2021-08-01

## 2021-08-02 ENCOUNTER — TELEPHONE (OUTPATIENT)
Dept: ORTHOPEDICS | Facility: CLINIC | Age: 51
End: 2021-08-02

## 2021-08-22 DIAGNOSIS — I10 ESSENTIAL HYPERTENSION: ICD-10-CM

## 2021-08-24 RX ORDER — AMLODIPINE BESYLATE 5 MG/1
TABLET ORAL
Qty: 30 TABLET | Refills: 0 | Status: SHIPPED | OUTPATIENT
Start: 2021-08-24 | End: 2021-09-07

## 2021-10-09 DIAGNOSIS — I10 ESSENTIAL HYPERTENSION: ICD-10-CM

## 2021-10-10 RX ORDER — METOPROLOL SUCCINATE 50 MG/1
TABLET, EXTENDED RELEASE ORAL
Qty: 90 TABLET | Refills: 0 | Status: SHIPPED | OUTPATIENT
Start: 2021-10-10 | End: 2021-11-03 | Stop reason: SDUPTHER

## 2021-11-03 ENCOUNTER — OFFICE VISIT (OUTPATIENT)
Dept: INTERNAL MEDICINE | Facility: CLINIC | Age: 51
End: 2021-11-03
Payer: COMMERCIAL

## 2021-11-03 ENCOUNTER — LAB VISIT (OUTPATIENT)
Dept: LAB | Facility: HOSPITAL | Age: 51
End: 2021-11-03
Attending: FAMILY MEDICINE
Payer: COMMERCIAL

## 2021-11-03 VITALS
WEIGHT: 293 LBS | TEMPERATURE: 98 F | SYSTOLIC BLOOD PRESSURE: 126 MMHG | HEART RATE: 96 BPM | BODY MASS INDEX: 47.09 KG/M2 | HEIGHT: 66 IN | DIASTOLIC BLOOD PRESSURE: 80 MMHG | OXYGEN SATURATION: 97 %

## 2021-11-03 DIAGNOSIS — F41.9 ANXIETY AND DEPRESSION: ICD-10-CM

## 2021-11-03 DIAGNOSIS — I10 ESSENTIAL HYPERTENSION: Primary | ICD-10-CM

## 2021-11-03 DIAGNOSIS — F32.A ANXIETY AND DEPRESSION: ICD-10-CM

## 2021-11-03 DIAGNOSIS — E55.9 VITAMIN D DEFICIENCY: ICD-10-CM

## 2021-11-03 DIAGNOSIS — K21.9 GASTROESOPHAGEAL REFLUX DISEASE WITHOUT ESOPHAGITIS: ICD-10-CM

## 2021-11-03 LAB — 25(OH)D3+25(OH)D2 SERPL-MCNC: 14 NG/ML (ref 30–96)

## 2021-11-03 PROCEDURE — 90471 FLU VACCINE (QUAD) GREATER THAN OR EQUAL TO 3YO PRESERVATIVE FREE IM: ICD-10-PCS | Mod: S$GLB,,, | Performed by: FAMILY MEDICINE

## 2021-11-03 PROCEDURE — 90471 IMMUNIZATION ADMIN: CPT | Mod: S$GLB,,, | Performed by: FAMILY MEDICINE

## 2021-11-03 PROCEDURE — 99214 OFFICE O/P EST MOD 30 MIN: CPT | Mod: 25,S$GLB,, | Performed by: FAMILY MEDICINE

## 2021-11-03 PROCEDURE — 3079F PR MOST RECENT DIASTOLIC BLOOD PRESSURE 80-89 MM HG: ICD-10-PCS | Mod: CPTII,S$GLB,, | Performed by: FAMILY MEDICINE

## 2021-11-03 PROCEDURE — 1160F PR REVIEW ALL MEDS BY PRESCRIBER/CLIN PHARMACIST DOCUMENTED: ICD-10-PCS | Mod: CPTII,S$GLB,, | Performed by: FAMILY MEDICINE

## 2021-11-03 PROCEDURE — 1159F MED LIST DOCD IN RCRD: CPT | Mod: CPTII,S$GLB,, | Performed by: FAMILY MEDICINE

## 2021-11-03 PROCEDURE — 3074F SYST BP LT 130 MM HG: CPT | Mod: CPTII,S$GLB,, | Performed by: FAMILY MEDICINE

## 2021-11-03 PROCEDURE — 1159F PR MEDICATION LIST DOCUMENTED IN MEDICAL RECORD: ICD-10-PCS | Mod: CPTII,S$GLB,, | Performed by: FAMILY MEDICINE

## 2021-11-03 PROCEDURE — 99214 PR OFFICE/OUTPT VISIT, EST, LEVL IV, 30-39 MIN: ICD-10-PCS | Mod: 25,S$GLB,, | Performed by: FAMILY MEDICINE

## 2021-11-03 PROCEDURE — 3008F BODY MASS INDEX DOCD: CPT | Mod: CPTII,S$GLB,, | Performed by: FAMILY MEDICINE

## 2021-11-03 PROCEDURE — 90686 FLU VACCINE (QUAD) GREATER THAN OR EQUAL TO 3YO PRESERVATIVE FREE IM: ICD-10-PCS | Mod: S$GLB,,, | Performed by: FAMILY MEDICINE

## 2021-11-03 PROCEDURE — 1160F RVW MEDS BY RX/DR IN RCRD: CPT | Mod: CPTII,S$GLB,, | Performed by: FAMILY MEDICINE

## 2021-11-03 PROCEDURE — 90686 IIV4 VACC NO PRSV 0.5 ML IM: CPT | Mod: S$GLB,,, | Performed by: FAMILY MEDICINE

## 2021-11-03 PROCEDURE — 3079F DIAST BP 80-89 MM HG: CPT | Mod: CPTII,S$GLB,, | Performed by: FAMILY MEDICINE

## 2021-11-03 PROCEDURE — 36415 COLL VENOUS BLD VENIPUNCTURE: CPT | Performed by: FAMILY MEDICINE

## 2021-11-03 PROCEDURE — 3008F PR BODY MASS INDEX (BMI) DOCUMENTED: ICD-10-PCS | Mod: CPTII,S$GLB,, | Performed by: FAMILY MEDICINE

## 2021-11-03 PROCEDURE — 99999 PR PBB SHADOW E&M-EST. PATIENT-LVL IV: ICD-10-PCS | Mod: PBBFAC,,, | Performed by: FAMILY MEDICINE

## 2021-11-03 PROCEDURE — 82306 VITAMIN D 25 HYDROXY: CPT | Performed by: FAMILY MEDICINE

## 2021-11-03 PROCEDURE — 99999 PR PBB SHADOW E&M-EST. PATIENT-LVL IV: CPT | Mod: PBBFAC,,, | Performed by: FAMILY MEDICINE

## 2021-11-03 PROCEDURE — 3074F PR MOST RECENT SYSTOLIC BLOOD PRESSURE < 130 MM HG: ICD-10-PCS | Mod: CPTII,S$GLB,, | Performed by: FAMILY MEDICINE

## 2021-11-03 RX ORDER — SERTRALINE HYDROCHLORIDE 50 MG/1
50 TABLET, FILM COATED ORAL DAILY
Qty: 90 TABLET | Refills: 1 | Status: SHIPPED | OUTPATIENT
Start: 2021-11-03 | End: 2022-04-18

## 2021-11-03 RX ORDER — PANTOPRAZOLE SODIUM 40 MG/1
40 TABLET, DELAYED RELEASE ORAL DAILY
Qty: 90 TABLET | Refills: 1 | Status: SHIPPED | OUTPATIENT
Start: 2021-11-03 | End: 2022-08-05

## 2021-11-03 RX ORDER — METOPROLOL SUCCINATE 50 MG/1
50 TABLET, EXTENDED RELEASE ORAL DAILY
Qty: 90 TABLET | Refills: 1 | Status: SHIPPED | OUTPATIENT
Start: 2021-11-03 | End: 2021-12-13

## 2021-11-03 RX ORDER — SERTRALINE HYDROCHLORIDE 50 MG/1
TABLET, FILM COATED ORAL
COMMUNITY
Start: 2021-09-17 | End: 2021-11-03 | Stop reason: SDUPTHER

## 2021-11-03 RX ORDER — AMLODIPINE BESYLATE 5 MG/1
5 TABLET ORAL DAILY
Qty: 90 TABLET | Refills: 1 | Status: SHIPPED | OUTPATIENT
Start: 2021-11-03 | End: 2022-12-01

## 2021-11-04 DIAGNOSIS — E55.9 VITAMIN D DEFICIENCY: Primary | ICD-10-CM

## 2021-11-04 RX ORDER — VIT C/E/ZN/COPPR/LUTEIN/ZEAXAN 250MG-90MG
1000 CAPSULE ORAL DAILY
Refills: 0 | COMMUNITY
Start: 2021-11-04 | End: 2023-01-03

## 2021-12-10 ENCOUNTER — IMMUNIZATION (OUTPATIENT)
Dept: PRIMARY CARE CLINIC | Facility: CLINIC | Age: 51
End: 2021-12-10
Payer: COMMERCIAL

## 2021-12-10 DIAGNOSIS — Z23 NEED FOR VACCINATION: Primary | ICD-10-CM

## 2021-12-10 PROCEDURE — 0034A COVID-19,VECTOR-NR,RS-AD26,PF,0.5 ML DOSE VACCINE (JANSSEN): CPT | Mod: CV19,PBBFAC | Performed by: FAMILY MEDICINE

## 2021-12-10 PROCEDURE — 91303 COVID-19,VECTOR-NR,RS-AD26,PF,0.5 ML DOSE VACCINE (JANSSEN): CPT | Mod: PBBFAC | Performed by: FAMILY MEDICINE

## 2021-12-13 DIAGNOSIS — I10 ESSENTIAL HYPERTENSION: ICD-10-CM

## 2021-12-13 RX ORDER — METOPROLOL SUCCINATE 50 MG/1
TABLET, EXTENDED RELEASE ORAL
Qty: 180 TABLET | Refills: 3 | Status: SHIPPED | OUTPATIENT
Start: 2021-12-13 | End: 2022-04-18

## 2021-12-28 ENCOUNTER — HOSPITAL ENCOUNTER (OUTPATIENT)
Dept: RADIOLOGY | Facility: HOSPITAL | Age: 51
Discharge: HOME | End: 2021-12-28
Attending: NURSE PRACTITIONER
Payer: COMMERCIAL

## 2021-12-28 DIAGNOSIS — Z12.31 BREAST CANCER SCREENING BY MAMMOGRAM: ICD-10-CM

## 2021-12-28 PROCEDURE — 77063 BREAST TOMOSYNTHESIS BI: CPT | Mod: 26,,, | Performed by: RADIOLOGY

## 2021-12-28 PROCEDURE — 77067 SCR MAMMO BI INCL CAD: CPT | Mod: TC

## 2021-12-28 PROCEDURE — 77063 MAMMO DIGITAL SCREENING BILAT WITH TOMO: ICD-10-PCS | Mod: 26,,, | Performed by: RADIOLOGY

## 2021-12-28 PROCEDURE — 77067 SCR MAMMO BI INCL CAD: CPT | Mod: 26,,, | Performed by: RADIOLOGY

## 2021-12-28 PROCEDURE — 77067 MAMMO DIGITAL SCREENING BILAT WITH TOMO: ICD-10-PCS | Mod: 26,,, | Performed by: RADIOLOGY

## 2021-12-30 ENCOUNTER — TELEPHONE (OUTPATIENT)
Dept: OBSTETRICS AND GYNECOLOGY | Facility: CLINIC | Age: 51
End: 2021-12-30
Payer: COMMERCIAL

## 2022-03-21 ENCOUNTER — PATIENT MESSAGE (OUTPATIENT)
Dept: ADMINISTRATIVE | Facility: HOSPITAL | Age: 52
End: 2022-03-21
Payer: COMMERCIAL

## 2022-04-18 ENCOUNTER — OFFICE VISIT (OUTPATIENT)
Dept: INTERNAL MEDICINE | Facility: CLINIC | Age: 52
End: 2022-04-18
Payer: COMMERCIAL

## 2022-04-18 VITALS
HEIGHT: 66 IN | OXYGEN SATURATION: 95 % | DIASTOLIC BLOOD PRESSURE: 78 MMHG | TEMPERATURE: 98 F | BODY MASS INDEX: 45.42 KG/M2 | WEIGHT: 282.63 LBS | HEART RATE: 100 BPM | SYSTOLIC BLOOD PRESSURE: 110 MMHG

## 2022-04-18 DIAGNOSIS — M25.561 CHRONIC PAIN OF BOTH KNEES: Primary | ICD-10-CM

## 2022-04-18 DIAGNOSIS — E66.01 MORBID OBESITY WITH BMI OF 45.0-49.9, ADULT: ICD-10-CM

## 2022-04-18 DIAGNOSIS — Z13.29 THYROID DISORDER SCREEN: ICD-10-CM

## 2022-04-18 DIAGNOSIS — Z12.11 COLON CANCER SCREENING: ICD-10-CM

## 2022-04-18 DIAGNOSIS — Z13.220 SCREENING FOR LIPOID DISORDERS: ICD-10-CM

## 2022-04-18 DIAGNOSIS — Z90.3 S/P GASTRIC SLEEVE PROCEDURE: ICD-10-CM

## 2022-04-18 DIAGNOSIS — G89.29 CHRONIC PAIN OF BOTH KNEES: Primary | ICD-10-CM

## 2022-04-18 DIAGNOSIS — I10 ESSENTIAL HYPERTENSION: ICD-10-CM

## 2022-04-18 DIAGNOSIS — F32.A ANXIETY AND DEPRESSION: ICD-10-CM

## 2022-04-18 DIAGNOSIS — Z00.00 ROUTINE GENERAL MEDICAL EXAMINATION AT A HEALTH CARE FACILITY: Primary | ICD-10-CM

## 2022-04-18 DIAGNOSIS — F41.9 ANXIETY AND DEPRESSION: ICD-10-CM

## 2022-04-18 DIAGNOSIS — M25.562 CHRONIC PAIN OF BOTH KNEES: Primary | ICD-10-CM

## 2022-04-18 PROCEDURE — 1159F PR MEDICATION LIST DOCUMENTED IN MEDICAL RECORD: ICD-10-PCS | Mod: CPTII,S$GLB,, | Performed by: FAMILY MEDICINE

## 2022-04-18 PROCEDURE — 3008F PR BODY MASS INDEX (BMI) DOCUMENTED: ICD-10-PCS | Mod: CPTII,S$GLB,, | Performed by: FAMILY MEDICINE

## 2022-04-18 PROCEDURE — 1160F RVW MEDS BY RX/DR IN RCRD: CPT | Mod: CPTII,S$GLB,, | Performed by: FAMILY MEDICINE

## 2022-04-18 PROCEDURE — 99999 PR PBB SHADOW E&M-EST. PATIENT-LVL V: ICD-10-PCS | Mod: PBBFAC,,, | Performed by: FAMILY MEDICINE

## 2022-04-18 PROCEDURE — 99999 PR PBB SHADOW E&M-EST. PATIENT-LVL V: CPT | Mod: PBBFAC,,, | Performed by: FAMILY MEDICINE

## 2022-04-18 PROCEDURE — 1160F PR REVIEW ALL MEDS BY PRESCRIBER/CLIN PHARMACIST DOCUMENTED: ICD-10-PCS | Mod: CPTII,S$GLB,, | Performed by: FAMILY MEDICINE

## 2022-04-18 PROCEDURE — 99214 PR OFFICE/OUTPT VISIT, EST, LEVL IV, 30-39 MIN: ICD-10-PCS | Mod: S$GLB,,, | Performed by: FAMILY MEDICINE

## 2022-04-18 PROCEDURE — 3008F BODY MASS INDEX DOCD: CPT | Mod: CPTII,S$GLB,, | Performed by: FAMILY MEDICINE

## 2022-04-18 PROCEDURE — 3074F PR MOST RECENT SYSTOLIC BLOOD PRESSURE < 130 MM HG: ICD-10-PCS | Mod: CPTII,S$GLB,, | Performed by: FAMILY MEDICINE

## 2022-04-18 PROCEDURE — 1159F MED LIST DOCD IN RCRD: CPT | Mod: CPTII,S$GLB,, | Performed by: FAMILY MEDICINE

## 2022-04-18 PROCEDURE — 99214 OFFICE O/P EST MOD 30 MIN: CPT | Mod: S$GLB,,, | Performed by: FAMILY MEDICINE

## 2022-04-18 PROCEDURE — 3078F DIAST BP <80 MM HG: CPT | Mod: CPTII,S$GLB,, | Performed by: FAMILY MEDICINE

## 2022-04-18 PROCEDURE — 3078F PR MOST RECENT DIASTOLIC BLOOD PRESSURE < 80 MM HG: ICD-10-PCS | Mod: CPTII,S$GLB,, | Performed by: FAMILY MEDICINE

## 2022-04-18 PROCEDURE — 3074F SYST BP LT 130 MM HG: CPT | Mod: CPTII,S$GLB,, | Performed by: FAMILY MEDICINE

## 2022-04-18 RX ORDER — ONDANSETRON 4 MG/1
TABLET, ORALLY DISINTEGRATING ORAL
COMMUNITY
Start: 2022-03-02 | End: 2023-01-03

## 2022-04-18 RX ORDER — ACETAMINOPHEN 500 MG
500 TABLET ORAL EVERY 6 HOURS PRN
COMMUNITY
Start: 2022-01-06

## 2022-04-18 RX ORDER — SERTRALINE HYDROCHLORIDE 25 MG/1
25 TABLET, FILM COATED ORAL DAILY
Qty: 90 TABLET | Refills: 1 | Status: SHIPPED | OUTPATIENT
Start: 2022-04-18 | End: 2023-05-10 | Stop reason: ALTCHOICE

## 2022-04-18 RX ORDER — DOCUSATE SODIUM 100 MG/1
100 CAPSULE, LIQUID FILLED ORAL DAILY PRN
COMMUNITY
Start: 2022-01-06 | End: 2022-08-22

## 2022-04-18 NOTE — ASSESSMENT & PLAN NOTE
Body mass index is 45.62 kg/m².    Wt Readings from Last 10 Encounters:   04/18/22 128.2 kg (282 lb 10.1 oz)   02/10/22 136.1 kg (300 lb)   11/23/21 (!) 144.6 kg (318 lb 12.8 oz)   11/03/21 (!) 144.3 kg (318 lb 0.2 oz)   08/09/21 (!) 142.4 kg (314 lb)   07/29/21 (!) 148.2 kg (326 lb 11.6 oz)   07/08/21 (!) 146.2 kg (322 lb 6.4 oz)   06/24/21 (!) 149 kg (328 lb 7.8 oz)   04/12/21 (!) 144.3 kg (318 lb 3.2 oz)   03/12/21 (!) 144 kg (317 lb 6.4 oz)

## 2022-04-18 NOTE — PROGRESS NOTES
Subjective:       Patient ID: Mary Russo is a 51 y.o. female.    Chief Complaint: Follow-up    Patient presents to clinic today for followup of chronic conditions. Had gastric sleeve with Dr. London in January, down 38 lbs. Reports BP has lowered, only taking amlodipine currently. Also reports taking 1/2 tablet of zoloft, never took 50 mg. Needs referral for knees, was not able to see Ortho previously.    Review of Systems   Constitutional: Negative for activity change and unexpected weight change.   HENT: Negative for hearing loss, rhinorrhea and trouble swallowing.    Eyes: Negative for discharge and visual disturbance.   Respiratory: Negative for chest tightness and wheezing.    Cardiovascular: Negative for chest pain and palpitations.   Gastrointestinal: Negative for blood in stool, constipation, diarrhea and vomiting.   Endocrine: Negative for polydipsia and polyuria.   Genitourinary: Negative for difficulty urinating, dysuria, hematuria and menstrual problem.   Musculoskeletal: Negative for arthralgias, joint swelling and neck pain.   Neurological: Negative for headaches.   Psychiatric/Behavioral: Negative for dysphoric mood.         Objective:      Physical Exam  Vitals reviewed.   Constitutional:       General: She is not in acute distress.     Appearance: She is well-developed.   HENT:      Head: Normocephalic and atraumatic.   Eyes:      General: Lids are normal. No scleral icterus.     Extraocular Movements: Extraocular movements intact.      Conjunctiva/sclera: Conjunctivae normal.      Pupils: Pupils are equal, round, and reactive to light.   Pulmonary:      Effort: Pulmonary effort is normal.   Neurological:      Mental Status: She is alert and oriented to person, place, and time.      Cranial Nerves: No cranial nerve deficit.      Gait: Gait normal.   Psychiatric:         Mood and Affect: Mood and affect normal.         Assessment:       1. Chronic pain of both knees    2. Anxiety and  depression    3. Essential hypertension    4. Colon cancer screening    5. S/P gastric sleeve procedure    6. Morbid obesity with BMI of 45.0-49.9, adult        Plan:     Problem List Items Addressed This Visit     Anxiety and depression    Current Assessment & Plan     Stable on 25 mg daily           Relevant Medications    sertraline (ZOLOFT) 25 MG tablet    Essential hypertension    Current Assessment & Plan     Improving with weight loss; d/c metoprolol; continue amlodipine           Morbid obesity with BMI of 45.0-49.9, adult    Current Assessment & Plan     Body mass index is 45.62 kg/m².    Wt Readings from Last 10 Encounters:   04/18/22 128.2 kg (282 lb 10.1 oz)   02/10/22 136.1 kg (300 lb)   11/23/21 (!) 144.6 kg (318 lb 12.8 oz)   11/03/21 (!) 144.3 kg (318 lb 0.2 oz)   08/09/21 (!) 142.4 kg (314 lb)   07/29/21 (!) 148.2 kg (326 lb 11.6 oz)   07/08/21 (!) 146.2 kg (322 lb 6.4 oz)   06/24/21 (!) 149 kg (328 lb 7.8 oz)   04/12/21 (!) 144.3 kg (318 lb 3.2 oz)   03/12/21 (!) 144 kg (317 lb 6.4 oz)                S/P gastric sleeve procedure    Overview     Dr. London, January 2022             Other Visit Diagnoses     Chronic pain of both knees    -  Primary    Relevant Orders    Ambulatory referral/consult to Orthopedics    Colon cancer screening        Relevant Orders    Ambulatory referral/consult to Endo Procedure           Health Maintenance reviewed/updated.

## 2022-05-12 ENCOUNTER — OFFICE VISIT (OUTPATIENT)
Dept: SPORTS MEDICINE | Facility: CLINIC | Age: 52
End: 2022-05-12
Payer: COMMERCIAL

## 2022-05-12 VITALS — BODY MASS INDEX: 44.84 KG/M2 | HEIGHT: 66 IN | WEIGHT: 279 LBS

## 2022-05-12 DIAGNOSIS — M25.561 CHRONIC PAIN OF BOTH KNEES: Primary | ICD-10-CM

## 2022-05-12 DIAGNOSIS — E66.01 CLASS 3 SEVERE OBESITY DUE TO EXCESS CALORIES WITH SERIOUS COMORBIDITY AND BODY MASS INDEX (BMI) OF 45.0 TO 49.9 IN ADULT: ICD-10-CM

## 2022-05-12 DIAGNOSIS — G89.29 CHRONIC PAIN OF BOTH KNEES: Primary | ICD-10-CM

## 2022-05-12 DIAGNOSIS — M17.0 PRIMARY OSTEOARTHRITIS OF BOTH KNEES: ICD-10-CM

## 2022-05-12 DIAGNOSIS — M25.562 CHRONIC PAIN OF BOTH KNEES: Primary | ICD-10-CM

## 2022-05-12 PROCEDURE — 3008F BODY MASS INDEX DOCD: CPT | Mod: CPTII,S$GLB,, | Performed by: PHYSICAL MEDICINE & REHABILITATION

## 2022-05-12 PROCEDURE — 99999 PR PBB SHADOW E&M-EST. PATIENT-LVL V: CPT | Mod: PBBFAC,,, | Performed by: PHYSICAL MEDICINE & REHABILITATION

## 2022-05-12 PROCEDURE — 99999 PR PBB SHADOW E&M-EST. PATIENT-LVL V: ICD-10-PCS | Mod: PBBFAC,,, | Performed by: PHYSICAL MEDICINE & REHABILITATION

## 2022-05-12 PROCEDURE — 1159F MED LIST DOCD IN RCRD: CPT | Mod: CPTII,S$GLB,, | Performed by: PHYSICAL MEDICINE & REHABILITATION

## 2022-05-12 PROCEDURE — 1160F RVW MEDS BY RX/DR IN RCRD: CPT | Mod: CPTII,S$GLB,, | Performed by: PHYSICAL MEDICINE & REHABILITATION

## 2022-05-12 PROCEDURE — 99204 OFFICE O/P NEW MOD 45 MIN: CPT | Mod: S$GLB,,, | Performed by: PHYSICAL MEDICINE & REHABILITATION

## 2022-05-12 PROCEDURE — 3008F PR BODY MASS INDEX (BMI) DOCUMENTED: ICD-10-PCS | Mod: CPTII,S$GLB,, | Performed by: PHYSICAL MEDICINE & REHABILITATION

## 2022-05-12 PROCEDURE — 99204 PR OFFICE/OUTPT VISIT, NEW, LEVL IV, 45-59 MIN: ICD-10-PCS | Mod: S$GLB,,, | Performed by: PHYSICAL MEDICINE & REHABILITATION

## 2022-05-12 PROCEDURE — 1160F PR REVIEW ALL MEDS BY PRESCRIBER/CLIN PHARMACIST DOCUMENTED: ICD-10-PCS | Mod: CPTII,S$GLB,, | Performed by: PHYSICAL MEDICINE & REHABILITATION

## 2022-05-12 PROCEDURE — 1159F PR MEDICATION LIST DOCUMENTED IN MEDICAL RECORD: ICD-10-PCS | Mod: CPTII,S$GLB,, | Performed by: PHYSICAL MEDICINE & REHABILITATION

## 2022-05-12 RX ORDER — DICLOFENAC SODIUM 10 MG/G
2 GEL TOPICAL 4 TIMES DAILY
Qty: 1 EACH | Refills: 2 | Status: SHIPPED | OUTPATIENT
Start: 2022-05-12

## 2022-05-12 NOTE — PROGRESS NOTES
SPORTS MEDICINE / PM&R New Patient Visit :    Referring Physician: Deloris Aiken MD    Chief Complaint   Patient presents with    Left Knee - Pain    Right Knee - Pain    Left Lower Leg - Pain       HPI: This is a 52 y.o.  female being seen in clinic today for evaluation of Pain of the Left Knee, Pain of the Right Knee, and Pain of the Left Lower Leg      The problem began 6 months ago.  She feels sharp, dull, throbbing, aching, burning, stabbing, constant, pain at rest and pain with movement pain in and around her right and left knee . The symptoms are worsening. She has tried aspercream and icy hot, heat, tylenol and NSAIDS with improvement. She has not tried therapy.     History obtained from patient.  Patient is a .  Patient has had bilateral knee pain for years and has seen Dr. Rodney in 2015 for 1 visit.  She states that she elected to try and lose weight and exercise to try to decrease her knee pain and has started to have constant knee pain again since January.  She states that she has been trying to exercise 3 x a week and is [facility] and has had weight loss surgery in January.  She has been active in water aerobics, bike, eliptical and treadmill but notices that if she does the treadmill on flat surface her pain increases.  She states that the left knee and lower leg are greater in pain than the right and that she is getting a popping sensation in the knee.  She is interested in prolonging the life of her knees.      Past family, medical, social, surgical history, and vital signs reviewed in chart.    General    Nursing note and vitals reviewed.  Constitutional: She is oriented to person, place, and time. She appears well-developed and well-nourished.   HENT:   Head: Normocephalic and atraumatic.   Eyes: Conjunctivae and EOM are normal. Pupils are equal, round, and reactive to light.   Neck: Neck supple.   Cardiovascular: Intact distal pulses.     Pulmonary/Chest: Effort normal. No respiratory distress.   Abdominal: She exhibits no distension.   Neurological: She is alert and oriented to person, place, and time. She has normal reflexes.   Psychiatric: She has a normal mood and affect.     General Musculoskeletal Exam   Gait: antalgic       Right Knee Exam     Inspection   Erythema: absent  Swelling: absent  Effusion: absent    Tenderness   The patient is tender to palpation of the medial joint line.    Crepitus   The patient has crepitus of the medial joint line.    Range of Motion   The patient has normal right knee ROM.    Tests   Meniscus   Hunter:  Medial - negative Lateral - negative  Ligament Examination Lachman: normal (-1 to 2mm) PCL-Posterior Drawer: normal (0 to 2mm)     MCL - Valgus: normal (0 to 2mm)  LCL - Varus: normal  Patella   Patellar apprehension: negative  Patellar Tracking: normal    Other   Popliteal (Baker's) Cyst: absent  Sensation: normal    Left Knee Exam     Inspection   Erythema: absent  Swelling: absent  Effusion: absent    Tenderness   The patient tender to palpation of the medial joint line.    Crepitus   The patient has crepitus of the medial joint line.    Range of Motion   The patient has normal left knee ROM.    Tests   Meniscus   Hunter:  Medial - negative Lateral - negative  Stability Lachman: normal (-1 to 2mm) PCL-Posterior Drawer: normal (0 to 2mm)  MCL - Valgus: normal (0 to 2mm)  LCL - Varus: normal (0 to 2mm)  Patella   Patellar apprehension: negative  Patellar Tracking: normal    Other   Popliteal (Baker's) Cyst: absent  Sensation: normal    Right Hip Exam   Right hip exam is normal.     Tests   Pain w/ forced internal rotation (SHERIE): absent  Left Hip Exam   Left hip exam is normal.    Tests   Pain w/ forced internal rotation (SHERIE): absent          Muscle Strength   Right Lower Extremity   Hip Abduction: 4/5   Hip Adduction: 5/5   Hip Flexion: 5/5   Quadriceps:  5/5   Hamstrin/5   Left Lower Extremity    Hip Abduction: 4/5   Hip Adduction: 5/5   Hip Flexion: 5/5   Quadriceps:  5/5   Hamstrin/5     Reflexes     Left Side  Achilles:  2+  Quadriceps:  2+    Right Side   Achilles:  2+  Quadriceps:  2+    Vascular Exam     Right Pulses  Dorsalis Pedis:      2+          Left Pulses  Dorsalis Pedis:      2+              Mammo Digital Screening Bilat w/ Lemuel  Narrative: Result:  Mammo Digital Screening Bilat w/ Lemuel    History:  Patient is 51 y.o. and is seen for a screening mammogram.    Films Compared:  Compared to: 2020 Mammo Digital Screening Bilat w/ Lemuel and   2019 Mammo Digital Screening Bilat w/ Lemuel     Findings:   This procedure was performed using tomosynthesis.   Computer-aided detection was utilized in the interpretation of this   examination.    The breasts are heterogeneously dense, which may obscure small masses.   There is no evidence of suspicious masses, microcalcifications or   architectural distortion.  Impression:    No mammographic evidence of malignancy.    BI-RADS Category 1: Negative    Recommendation:  Routine screening mammogram in 1 year is recommended.    Your estimated lifetime risk of breast cancer (to age 85) based on   Tyrer-Cuzick risk assessment model is 21.82 %.  According to the American   Cancer Society, patients with a lifetime breast cancer risk of 20% or   higher might benefit from supplemental screening tests. ??          Patient Instructions     Assessment:  Mary Russo is a 52 y.o. female   Chief Complaint   Patient presents with    Left Knee - Pain    Right Knee - Pain    Left Lower Leg - Pain       Chronic pain of both knees  -     Ambulatory referral/consult to Orthopedics  -     Ambulatory referral/consult to Physical/Occupational Therapy; Future; Expected date: 2022  -     diclofenac sodium (VOLTAREN) 1 % Gel; Apply 2 g topically 4 (four) times daily.  Dispense: 1 each; Refill: 2    Primary osteoarthritis of both knees    Class 3 severe  obesity due to excess calories with serious comorbidity and body mass index (BMI) of 45.0 to 49.9 in adult      Plan:   We personally reviewed her imaging today in clinic and agree with the radiologist's report.   I think medial knee arthritis is the biggest source of her pain.   We discussed the importance of dietary changes to lose weight rather than trying to increase exercise for weight loss purposes.   However, I do think is very important that she increase her amount of protein and perform strengthening exercises 3 times a week to maintain lean muscle mass.   I would like her to get into physical therapy at [facility] to learn exercises that particular can help the knee and she will continue these going forward as part of her strength planned.   She will continue taking collagen supplementation.   She will try Voltaren gel   Recommend trying Voltaren Gel, or generic diclofenac gel, over the counter, and following the directions on the package (4 times daily, give at least a week to see if it helps. Use it for 3 weeks on / 1 week off).   Remember that you may need to use it consistently for several days before seeing results.               Follow-up: 4 - 6 weeks or sooner if there are any problems between now and then.    Thank you for choosing [facility] and Dr. Debra Wu for your orthopedic & sports medicine care. It is our goal to provide you with exceptional care that will help keep you healthy, active, and get you back in the game.    Please do not hesitate to reach out to us via email, phone, or MyChart with any questions, concerns, or feedback.    If you felt that you received exemplary care today, please consider leaving us feedback on Healthgrades at:  [email or web address]     If you are experiencing pain/discomfort ,or have questions after 5pm and would like to be connected to the [facility] [place] on-call  team, please call this number and specify which Sports Medicine provider is treating you: [phone]         Disclaimer: This note was prepared using a voice recognition system and is likely to have sound alike errors within the text.     Debra Wu M.D.  Sports Medicine

## 2022-05-12 NOTE — PATIENT INSTRUCTIONS
Assessment:  Mary Russo is a 52 y.o. female   Chief Complaint   Patient presents with    Left Knee - Pain    Right Knee - Pain    Left Lower Leg - Pain       Chronic pain of both knees  -     Ambulatory referral/consult to Orthopedics  -     Ambulatory referral/consult to Physical/Occupational Therapy; Future; Expected date: 05/19/2022  -     diclofenac sodium (VOLTAREN) 1 % Gel; Apply 2 g topically 4 (four) times daily.  Dispense: 1 each; Refill: 2    Primary osteoarthritis of both knees    Class 3 severe obesity due to excess calories with serious comorbidity and body mass index (BMI) of 45.0 to 49.9 in adult      Plan:  We personally reviewed her imaging today in clinic and agree with the radiologist's report.  I think medial knee arthritis is the biggest source of her pain.  We discussed the importance of dietary changes to lose weight rather than trying to increase exercise for weight loss purposes.  However, I do think is very important that she increase her amount of protein and perform strengthening exercises 3 times a week to maintain lean muscle mass.  I would like her to get into physical therapy at [facility] to learn exercises that particular can help the knee and she will continue these going forward as part of her strength planned.  She will continue taking collagen supplementation.  She will try Voltaren gel  Recommend trying Voltaren Gel, or generic diclofenac gel, over the counter, and following the directions on the package (4 times daily, give at least a week to see if it helps. Use it for 3 weeks on / 1 week off).   Remember that you may need to use it consistently for several days before seeing results.            Follow-up: 4 - 6 weeks or sooner if there are any problems between now and then.    Thank you for choosing [facility] and Dr. Debra Wu for your orthopedic & sports medicine care. It is our goal to provide you with exceptional care that will help keep  you healthy, active, and get you back in the game.    Please do not hesitate to reach out to us via email, phone, or MyChart with any questions, concerns, or feedback.    If you felt that you received exemplary care today, please consider leaving us feedback on Healthgrades at:  [email or web address]     If you are experiencing pain/discomfort ,or have questions after 5pm and would like to be connected to the [facility] [place] on-call team, please call this number and specify which Sports Medicine provider is treating you: [phone]

## 2022-05-20 ENCOUNTER — CLINICAL SUPPORT (OUTPATIENT)
Dept: REHABILITATION | Facility: HOSPITAL | Age: 52
End: 2022-05-20
Attending: PHYSICAL MEDICINE & REHABILITATION
Payer: COMMERCIAL

## 2022-05-20 DIAGNOSIS — M25.562 CHRONIC PAIN OF BOTH KNEES: ICD-10-CM

## 2022-05-20 DIAGNOSIS — G89.29 CHRONIC PAIN OF BOTH KNEES: ICD-10-CM

## 2022-05-20 DIAGNOSIS — M25.561 CHRONIC PAIN OF BOTH KNEES: ICD-10-CM

## 2022-05-20 DIAGNOSIS — R53.1 WEAKNESS: ICD-10-CM

## 2022-05-20 DIAGNOSIS — R52 PAIN: ICD-10-CM

## 2022-05-20 PROCEDURE — 97161 PT EVAL LOW COMPLEX 20 MIN: CPT

## 2022-05-20 NOTE — PLAN OF CARE
[facility]  Physical Therapy Initial Evaluation    Name: Mary Russo  Clinic Number: [id number]    Therapy Diagnosis:   Encounter Diagnoses   Name Primary?    Chronic pain of both knees     Weakness     Pain      Physician: Debra Wu MD    Physician Orders: PT Eval and Treat   Medical Diagnosis from Referral: Chronic pain of both knees   Evaluation Date: 5/20/2022  Authorization Period Expiration: 05/12/2023  Plan of Care Expiration: 7/29/22  Visit # / Visits authorized: 1/ 1  FOTO: 1/3  Precautions: Standard, BP    Time In: 9:07  Time Out: 9:52  Total Billable Time: 45 minutes (low Complexity Evaluation, Therapeutic Exercise - 5       Subjective   Date of onset: 6+ years on and off  History of current condition - Mary reports: knee pain B / L > R , she also reports stiffness with prolong sitting. She reports she is trying to lose weight. Patient also reports popping in knees with pain. She reports she has had weight loss surgery.     Medical History:   Past Medical History:   Diagnosis Date    Acid reflux     Anxiety     Arthritis     Carpal tunnel syndrome of right wrist     Cholelithiases     Hypertension     Iron deficiency     Morbid obesity        Surgical History:   Mary Russo  has a past surgical history that includes Tubal ligation; Hernia repair; Cholecystectomy; Laparoscopic gastric banding (10/31/2017); Refractive surgery; Esophagogastroduodenoscopy (N/A, 10/14/2019); Esophagogastroduodenoscopy (N/A, 12/23/2019); and Eye surgery (2/2018).    Medications:   Mary has a current medication list which includes the following prescription(s): acetaminophen, amlodipine, cholecalciferol (vitamin d3), diclofenac sodium, docusate sodium, ondansetron, pantoprazole, sertraline, and temazepam.    Allergies:   Review of patient's allergies indicates:   Allergen Reactions    Adhesive Swelling     Swelling x2 with flu shot and band-aid.  Swells in  shape of band-aid.    Lisinopril Other (See Comments)     Cough    Latex, natural rubber Swelling     Localized swelling at site of bandaid/adhesive        Imaging, X-ray reveals : Primary osteoarthritis of both knees / medial knee arthritis    Prior Therapy: none for knees  Social History:  lives with their family  Occupation: desk job  Prior Level of Function: pain and limited functional mobility  Current Level of Function: pain is getting worse and limited functional mobility    Pain:   Current 3/10, worst 8/10, best 3/10   Location: bilateral knee   Description: Aching, Burning, Throbbing and Shooting  Aggravating Factors: Standing and Walking  Easing Factors: heating pad, rest and voltaren    Pts goals: Decrease pain and increase ROM    Objective     Posture: R hand dominant, APT, genu recurvatum,   Palpation: TTP medial joint line, L ITB  Sensation: intact to light touch B LE's  Range of Motion/Strength:           Knee Right Left Pain/Dysfunction with Movement   AROM      flexion  120  110 Pain L/ stretch R   extension  0  0        L/E MMT Right Left Pain/Dysfunction with Movement   Hip Flexion 5/5 5/5    Hip Abduction 4/5 3+/5    Hip Adduction 5/5 5/5    Knee Flexion 4/5 4/5    Knee Extension 4/5 3+/5    Ankle DF 5/5 5/5    Ankle PF 3-/5 3-/5 5 only       Flexibility: Hip ER limited B    Special Tests: patella mobility normal/ R crepitus and grinding compared to the L , SLR good    Gait Analysis:Without AD Assistance none Deviations: nothing significant noted     Other: 11 sit to stands in 30 secs    CMS Impairment/Limitation/Restriction for FOTO knee Survey    Therapist reviewed FOTO scores for Mary Ozuna Niles on 5/20/2022.   FOTO documents entered into [employer] - see Media section.    Limitation Score: 37%         TREATMENT   Treatment Time In: 9:07  Treatment Time Out: 9:52  Total Treatment time separate from Evaluation: 5 minutes    Mary received therapeutic exercises to develop strength, ROM and  flexibility for 5 minutes including:  Quad sets  SLR's  Goals and POC discussed  Questions and concerns addressed    Home Exercises Provided and Patient Education Provided       Education/Self-Care provided: (5) minutes   Patient educated on the impairments noted above and the effects of physical therapy intervention to improve overall condition and QOL.    Patient was educated on all the above exercise prior/during/after for proper posture, positioning, and execution for safe performance with home exercise program.      Written Home Exercises Provided: yes.  Exercises were reviewed and Mary was able to demonstrate them prior to the end of the session.  Mary demonstrated good  understanding of the education provided.     See EMR under Patient Instructions for exercises provided 5/20/2022.      Assessment   Mary is a 52 y.o. female referred to outpatient Physical Therapy with a medical diagnosis of chronic knee pain. Patient has OA in B knees with medial knee arthritis.  Pt presents with pain, limited ROM,  weakness, and posture deficits. Patient would benefit from skilled PT intervention for pain management, ROM, posture education, and strengthening to improve quality of life.     Pt prognosis is Fair.   Pt will benefit from skilled outpatient Physical Therapy to address the deficits stated above and in the chart below, provide pt/family education, and to maximize pt's level of independence.     Plan of care discussed with patient: Yes  Pt's spiritual, cultural and educational needs considered and patient is agreeable to the plan of care and goals as stated below:     Anticipated Barriers for therapy: pain/ attendance due to work obligations    Medical Necessity is demonstrated by the following  History  Co-morbidities and personal factors that may impact the plan of care Co-morbidities:   See PMHX    Personal Factors:   weight     low   Examination  Body Structures and Functions, activity limitations  and participation restrictions that may impact the plan of care Body Regions:   lower extremities    Body Systems:    ROM  strength  balance    Participation Restrictions:   pain    Activity limitations:   Learning and applying knowledge  no deficits    General Tasks and Commands  no deficits    Communication  no deficits    Mobility  walking    Self care  no deficits    Domestic Life  no deficits    Interactions/Relationships  no deficits    Life Areas  no deficits    Community and Social Life  no deficits         low   Clinical Presentation stable and uncomplicated low   Decision Making/ Complexity Score: low       Goals:  STG's 2 weeks  1. Patient will be independent with 50% of HEP    LTG's 10 weeks  1. Patient will improve FOTO disability score  to 28 %  disability or less in order to improve overall QOL & return to PLOF   2. Patient will report an overall decrease in pain with ADL's and functional mobility  3. Patient will increase strength by at least 1/2 muscle grade in affected musculature to   improve functional mobility  4. Patient will improve ROM in L knee by at least 5 degrees to improve functional mobility and ADL's  5. Patient will be more aware of posture throughout the day to reduce stress  and maintain optimal alignment of the spine  6. Patient will be independent with HEP and pain management  Plan   Plan of care Certification: 5/20/2022 to 7/29/22.    Outpatient Physical Therapy 2 times weekly for 10 weeks to include the following interventions: Electrical Stimulation PRN, Manual Therapy, Moist Heat/ Ice, Patient Education, Therapeutic Exercise and Ultrasound, ASTYM, Kinesiotaping PRN, Functional Dry Needling        Lubna Antonio, PT

## 2022-05-23 NOTE — PROGRESS NOTES
[facility]   Physical Therapy Treatment Note     Name: Mary Ozuna Russo  Clinic Number: [id number]    Therapy Diagnosis:   Encounter Diagnoses   Name Primary?    Weakness Yes    Pain      Physician: Debra Wu MD    Visit Date: 5/27/2022    Physician Orders: PT Eval and Treat   Medical Diagnosis from Referral: Chronic pain of both knees   Evaluation Date: 5/20/2022  Authorization Period Expiration: 05/12/2023  Plan of Care Expiration: 7/29/22  Visit # / Visits authorized: 1/ 20 + eval  FOTO: 1/3  Precautions: Standard, BP    PTA Visit #: 05     Time In: 2:37  Time Out: 3:24  Total Billable Time: 47 minutes    SUBJECTIVE     Pt reports: walking feels awkward today. Her knees feel unstable  She was compliant with home exercise program.  Response to previous treatment: no issues  Functional change: n/a at this time    Pain: 0/10  Location: bilateral knee      OBJECTIVE     Objective Measures updated at progress report unless specified.     Treatment     Mary received the treatments listed below:      therapeutic exercises to develop strength, endurance, ROM, flexibility, posture and core stabilization for 47 minutes including:  HEP reviewed quad sets/ SLR's  Nu step for ROM, strength, and endurance x 6 minutes  Ball in and outs 2 x 10 reps  qs with VMO isolation 2 x 15 reps  Bridges 2 x 15 reps  Ball squeezes 1 x 20 reps  Hip abduction with G TB 2 x 15 reps  Prone hamstring curls with G TB 2 x 15 reps  TG 6 bands 3 minutes DL/ heel raises 2 x 15 reps  Rebounder overhead with yellow ball   Braiding  Tail gaiters 3# 3 minutes   LAQ's 3# 2 x 10 reps B        Patient Education and Home Exercises     Home Exercises Provided and Patient Education Provided     Education provided:   - HEP reviewed  - reviewed importance of a balanced diet/ regular exercise    Written Home Exercises Provided: Patient instructed to cont prior HEP. Exercises were reviewed and Mary was able to  demonstrate them prior to the end of the session.  Mary demonstrated good  understanding of the education provided. See EMR under Patient Instructions for exercises provided during therapy sessions    ASSESSMENT     First visit after initial evaluation. New exercises initiated today. Patient tolerated treatment well with no complaints of pain. Braiding was challenging for patient. Patient is going to try and schedule 2 sessions next week.     Mary Is progressing well towards her goals.   Pt prognosis is Fair.     Pt will continue to benefit from skilled outpatient physical therapy to address the deficits listed in the problem list box on initial evaluation, provide pt/family education and to maximize pt's level of independence in the home and community environment.     Pt's spiritual, cultural and educational needs considered and pt agreeable to plan of care and goals.     Anticipated barriers to physical therapy: pain/ attendance due to work obligations    Goals: STG's 2 weeks  1. Patient will be independent with 50% of HEP     LTG's 10 weeks  1. Patient will improve FOTO disability score  to 28 %  disability or less in order to improve overall QOL & return to PLOF   2. Patient will report an overall decrease in pain with ADL's and functional mobility  3. Patient will increase strength by at least 1/2 muscle grade in affected musculature to improve functional mobility  4. Patient will improve ROM in L knee by at least 5 degrees to improve functional mobility and ADL's  5. Patient will be more aware of posture throughout the day to reduce stress  and maintain optimal alignment of the spine  6. Patient will be independent with HEP and pain management    PLAN     Plan of care Certification: 5/20/2022 to 7/29/22.     Outpatient Physical Therapy 2 times weekly for 10 weeks to include the following interventions: Electrical Stimulation PRN, Manual Therapy, Moist Heat/ Ice, Patient Education, Therapeutic Exercise  and Ultrasound, ASTYM, Kinesiotaping PRN, Functional Dry Needling    Lubna Antonio, PT

## 2022-05-27 ENCOUNTER — CLINICAL SUPPORT (OUTPATIENT)
Dept: REHABILITATION | Facility: HOSPITAL | Age: 52
End: 2022-05-27
Attending: PHYSICAL MEDICINE & REHABILITATION
Payer: COMMERCIAL

## 2022-05-27 DIAGNOSIS — R53.1 WEAKNESS: Primary | ICD-10-CM

## 2022-05-27 DIAGNOSIS — R52 PAIN: ICD-10-CM

## 2022-05-27 PROCEDURE — 97110 THERAPEUTIC EXERCISES: CPT

## 2022-05-29 ENCOUNTER — PATIENT MESSAGE (OUTPATIENT)
Dept: ADMINISTRATIVE | Facility: HOSPITAL | Age: 52
End: 2022-05-29
Payer: COMMERCIAL

## 2022-05-29 DIAGNOSIS — Z12.11 SCREENING FOR COLON CANCER: ICD-10-CM

## 2022-06-02 ENCOUNTER — CLINICAL SUPPORT (OUTPATIENT)
Dept: REHABILITATION | Facility: HOSPITAL | Age: 52
End: 2022-06-02
Payer: COMMERCIAL

## 2022-06-02 DIAGNOSIS — R53.1 WEAKNESS: Primary | ICD-10-CM

## 2022-06-02 DIAGNOSIS — R52 PAIN: ICD-10-CM

## 2022-06-02 PROCEDURE — 97110 THERAPEUTIC EXERCISES: CPT

## 2022-06-02 NOTE — PROGRESS NOTES
[facility]   Physical Therapy Treatment Note     Name: Mary zOuna Russo  Clinic Number: [id number]    Therapy Diagnosis:   Encounter Diagnoses   Name Primary?    Weakness Yes    Pain      Physician: Debra Wu MD    Visit Date: 6/2/2022    Physician Orders: PT Eval and Treat   Medical Diagnosis from Referral: Chronic pain of both knees   Evaluation Date: 5/20/2022  Authorization Period Expiration: 05/12/2023  Plan of Care Expiration: 7/29/22  Visit # / Visits authorized: 2/ 20 + eval  FOTO: 1/3  Precautions: Standard, BP    PTA Visit #: 05     Time In:3:26  Time Out: 4:09  Total Billable Time: 43 minutes    SUBJECTIVE     Pt reports: she worked out yesterday and she is a little tired today.  She was compliant with home exercise program.  Response to previous treatment: mild soreness  Functional change: n/a at this time    Pain:3- 4/10  Location: R medial knee      OBJECTIVE     Objective Measures updated at progress report unless specified.     Treatment     Mary received the treatments listed below:      therapeutic exercises to develop strength, endurance, ROM, flexibility, posture and core stabilization for 43 minutes including:  HEP reviewed quad sets/ SLR's  Nu step for ROM, strength, and endurance x 6 minutes NP  Bike for ROM, strength, and endurance x 6 minutes    TRX mini squats 2 x 15 reps  Rebounder overhead with yellow ball   Step ups 1 x 20 reps B forward  Standing TKE's 1 x 20 reps B  Hip adduction 40# 2 x 15 reps  Hip abduction 40# 2 x 15 reps  SLR phase 1, 2, 3 1 x 10 reps B  qs with VMO isolation 2 x 15 reps  Bridges 2 x 15 reps     deferred  Ball in and outs 2 x 10 reps  Ball squeezes 1 x 20 reps  Hip abduction with G TB 2 x 15 reps  Prone hamstring curls with G TB 2 x 15 reps  TG 6 bands 3 minutes DL/ heel raises 2 x 15 reps  Braiding  Tail gaiters 3# 3 minutes   LAQ's 3# 2 x 10 reps B        Patient Education and Home Exercises     Home Exercises Provided  and Patient Education Provided     Education provided:   - HEP reviewed  - reviewed importance of a balanced diet/ regular exercise  - added SLR phase 2 & 3 and bridges to HEP 6/2/22  - posture education in sitting and standing    Written Home Exercises Provided: Patient instructed to cont prior HEP. Exercises were reviewed and Mary was able to demonstrate them prior to the end of the session.  Mary demonstrated good  understanding of the education provided. See EMR under Patient Instructions for exercises provided during therapy sessions    ASSESSMENT     Step ups and standing TKE's added today as well as hip resistance machines. She tolerated treatment well. She was able to progress to SLR with all  3 phases with difficulty. Bridges are still very challenging. She reported a decrease in pain after treatment. Posture education provided in sitting and standing. Patient demonstrated and verbalized understanding.       Mary Is progressing well towards her goals.   Pt prognosis is Fair.     Pt will continue to benefit from skilled outpatient physical therapy to address the deficits listed in the problem list box on initial evaluation, provide pt/family education and to maximize pt's level of independence in the home and community environment.     Pt's spiritual, cultural and educational needs considered and pt agreeable to plan of care and goals.     Anticipated barriers to physical therapy: pain/ attendance due to work obligations    Goals: STG's 2 weeks  1. Patient will be independent with 50% of HEP Progressing     LTG's 10 weeks  1. Patient will improve FOTO disability score  to 28 %  disability or less in order to improve overall QOL & return to PLOF   2. Patient will report an overall decrease in pain with ADL's and functional mobility  3. Patient will increase strength by at least 1/2 muscle grade in affected musculature to improve functional mobility  4. Patient will improve ROM in L knee by at  least 5 degrees to improve functional mobility and ADL's  5. Patient will be more aware of posture throughout the day to reduce stress  and maintain optimal alignment of the spine  6. Patient will be independent with HEP and pain management    PLAN     Plan of care Certification: 5/20/2022 to 7/29/22.     Outpatient Physical Therapy 2 times weekly for 10 weeks to include the following interventions: Electrical Stimulation PRN, Manual Therapy, Moist Heat/ Ice, Patient Education, Therapeutic Exercise and Ultrasound, ASTYM, Kinesiotaping PRN, Functional Dry Needling    Lubna Antonio, PT

## 2022-06-09 ENCOUNTER — CLINICAL SUPPORT (OUTPATIENT)
Dept: REHABILITATION | Facility: HOSPITAL | Age: 52
End: 2022-06-09
Payer: COMMERCIAL

## 2022-06-09 DIAGNOSIS — R53.1 WEAKNESS: Primary | ICD-10-CM

## 2022-06-09 DIAGNOSIS — R52 PAIN: ICD-10-CM

## 2022-06-09 PROCEDURE — 97110 THERAPEUTIC EXERCISES: CPT | Mod: CQ

## 2022-06-09 NOTE — PROGRESS NOTES
"[facility]   Physical Therapist Assistant Treatment Note     Name: Mary Wrighthoun  Clinic Number: [id number]    Therapy Diagnosis:   Encounter Diagnoses   Name Primary?    Weakness Yes    Pain      Physician: Debra Wu MD    Visit Date: 6/9/2022    Physician Orders: PT Eval and Treat   Medical Diagnosis from Referral: Chronic pain of both knees   Evaluation Date: 5/20/2022  Authorization Period Expiration: 12/31/2022  Plan of Care Expiration: 7/29/22  Visit # / Visits authorized: 3/ 20 + eval    FOTO: 1/3    Precautions: Standard, BP    PTA Visit #: 1/5     Time In:5:15  Time Out: 6:13  Total Billable Time: 55 minutes    SUBJECTIVE     Pt reports: had to take some tylenol today for both knees  She was compliant with home exercise program.  Response to previous treatment: mild soreness  Functional change: n/a at this time    Pain: 3- 4/10 with tylenol  (5/10 before tylenol)  Location: B medial knees     OBJECTIVE     Objective Measures updated at progress report unless specified.     Treatment     Mary received the treatments listed below:      therapeutic exercises to develop strength, endurance, ROM, flexibility, posture and core stabilization for 55 minutes including:  HEP reviewed quad sets/ SLR's  Nu step for ROM, strength, and endurance x 6 minutes  Dynamic hamstring lengthening B   SLR phase 1, 2, 3  1x10 reps B  Quad sets with VMO isolation 2 x 15 reps  Bridges w/ball squeeze x10 reps ; w/GTB 2x10 reps  24" unsupported sit to stands x5  22" unsupported sit to stands x10  24" and 22" squat taps x8 ea  Step ups 1 x 20 reps B forward--P! From last session  Standing TKE's RTB hold for 3" w/slow release for 3" 1 x 15 reps B (staggered stance, foot remains in contact w/floor, posture erect, no support)  Side lying hip abduction 2x 8-10 B--very weak   Side lying backward hip circles 2x5 B--very weak  Modified planks on forearms/knees 2x17"--very weak  Hip adduction 40# 2 " x 15 reps--HEP at gym  Hip abduction 40# 2 x 15 reps--HEP at gym    Deferred  TRX mini squats 2 x 15 reps  Ball in and outs 2 x 10 reps  Ball squeezes 1 x 20 reps  Hip abduction with G TB 2 x 15 reps  Prone hamstring curls with G TB 2 x 15 reps  TG 6 bands 3 minutes DL/ heel raises 2 x 15 reps  Braiding  Tail gaiters 3# 3 minutes   LAQ's 3# 2 x 10 reps B  Bike for ROM, strength, and endurance x 6 minutes  Rebounder overhead with yellow ball         Patient Education and Home Exercises     Home Exercises Provided and Patient Education Provided     Education provided:   - HEP reviewed  - reviewed importance of a balanced diet/ regular exercise  - added SLR phase 2 & 3 and bridges to HEP 6/2/22  - posture education in sitting and standing    Written Home Exercises Provided: Patient instructed to cont prior HEP. Exercises were reviewed and Mary was able to demonstrate them prior to the end of the session.  Mary demonstrated good  understanding of the education provided. See EMR under Patient Instructions for exercises provided during therapy sessions    ASSESSMENT   Mary presented today with bilateral medial knee pain. She reported taking Tylenol about an hour before coming to therapy. Continued with quad and glute strengthening today with addition of unsupported sit to stands, squat taps, side lying hip abduction and backward circles and modified planks. Her glute medius is quite weak as is her anterior core which is needed for better knee support. Mary demonstrated a positive response to today's session as evidenced with her activity tolerance/progression and no reported increased pain. She was however fatigued end of session. Mary will benefit from continued core strengthening both anterior and posterior for improved knee support.   Mary Is progressing well towards her goals.   Pt prognosis is Fair.     Pt will continue to benefit from skilled outpatient physical therapy to address the  deficits listed in the problem list box on initial evaluation, provide pt/family education and to maximize pt's level of independence in the home and community environment.     Pt's spiritual, cultural and educational needs considered and pt agreeable to plan of care and goals.     Anticipated barriers to physical therapy: pain/ attendance due to work obligations    Goals: STG's 2 weeks  1. Patient will be independent with 50% of HEP Progressing     LTG's 10 weeks  1. Patient will improve FOTO disability score  to 28 %  disability or less in order to improve overall QOL & return to PLOF   2. Patient will report an overall decrease in pain with ADL's and functional mobility  3. Patient will increase strength by at least 1/2 muscle grade in affected musculature to improve functional mobility  4. Patient will improve ROM in L knee by at least 5 degrees to improve functional mobility and ADL's  5. Patient will be more aware of posture throughout the day to reduce stress  and maintain optimal alignment of the spine  6. Patient will be independent with HEP and pain management    PLAN     Plan of care Certification: 5/20/2022 to 7/29/22.     Outpatient Physical Therapy 2 times weekly for 10 weeks to include the following interventions: Electrical Stimulation PRN, Manual Therapy, Moist Heat/ Ice, Patient Education, Therapeutic Exercise and Ultrasound, ASTYM, Kinesiotaping PRN, Functional Dry Needling    Arleen Hicks, PTA

## 2022-06-23 ENCOUNTER — CLINICAL SUPPORT (OUTPATIENT)
Dept: REHABILITATION | Facility: HOSPITAL | Age: 52
End: 2022-06-23
Payer: COMMERCIAL

## 2022-06-23 DIAGNOSIS — R52 PAIN: ICD-10-CM

## 2022-06-23 DIAGNOSIS — R53.1 WEAKNESS: Primary | ICD-10-CM

## 2022-06-23 PROCEDURE — 97110 THERAPEUTIC EXERCISES: CPT

## 2022-06-23 NOTE — PROGRESS NOTES
[facility]   Physical Therapy Treatment Note     Name: Mary Ozuna Russo  Clinic Number: [id number]    Therapy Diagnosis:   Encounter Diagnoses   Name Primary?    Weakness Yes    Pain      Physician: Debra Wu MD    Visit Date: 6/23/2022    Physician Orders: PT Eval and Treat   Medical Diagnosis from Referral: Chronic pain of both knees   Evaluation Date: 5/20/2022  Authorization Period Expiration: 05/12/2023  Plan of Care Expiration: 7/29/22  Visit # / Visits authorized: 4/ 20 + eval  FOTO: 1/3  Precautions: Standard, BP    PTA Visit #: 05     Time In:3:16  Time Out: 3:57  Total Billable Time: 41 minutes    SUBJECTIVE     Pt reports: she worked out yesterday and she is a little tired today.  She was compliant with home exercise program.  Response to previous treatment: mild soreness  Functional change: n/a at this time    Pain:3- 4/10  Location: L knee    OBJECTIVE     Objective Measures updated at progress report unless specified.     Treatment     Mary received the treatments listed below:      therapeutic exercises to develop strength, endurance, ROM, flexibility, posture and core stabilization for 41 minutes including:  HEP reviewed quad sets/ SLR's  Nu step for ROM, strength, and endurance x 6 minutes NP  Bike for ROM, strength, and endurance x 8 minutes    TG 6 bands 3 minutes DL/ heel raises 2 x 15 reps  Step ups 1 x 10 reps B forward  Rebounder overhead with yellow weighted ball with alternating step pattern  Hip adduction 40# 2 x 15 reps  Hip abduction 40# 2 x 15 reps  Pushing sled with no added weight  Heel taps off of 2 in step 1 x 10 reps B  Bridges 2 x 10 reps   Single leg bridges with SLR 1 x 5 reps B  Dead bugs 1 x 15 reps B LE's    FOTO next visit    deferred  TRX mini squats 2 x 15 reps  Standing TKE's 1 x 20 reps B  SLR phase 1, 2, 3 1 x 10 reps B  qs with VMO isolation 2 x 15 reps      deferred  Ball in and outs 2 x 10 reps  Ball squeezes 1 x 20 reps  Hip  abduction with G TB 2 x 15 reps  Prone hamstring curls with G TB 2 x 15 reps  Braiding  Tail gaiters 3# 3 minutes   LAQ's 3# 2 x 10 reps B        Patient Education and Home Exercises     Home Exercises Provided and Patient Education Provided     Education provided:   - HEP reviewed  - reviewed importance of a balanced diet/ regular exercise  - added SLR phase 2 & 3 and bridges to HEP 6/2/22  - posture education in sitting and standing    Written Home Exercises Provided: Patient instructed to cont prior HEP. Exercises were reviewed and Mary was able to demonstrate them prior to the end of the session.  Mary demonstrated good  understanding of the education provided. See EMR under Patient Instructions for exercises provided during therapy sessions    ASSESSMENT     Forward step ups challenging. Heel taps initiated today with difficulty. She did well pushing sled with no added weight. Core strengthening is challenging, but patient progressed to single leg bridges with SLR and dead bugs with difficulty. She tolerated treatment well with no complaints of increase pain after the session.   Mary Is progressing well towards her goals.   Pt prognosis is Fair.     Pt will continue to benefit from skilled outpatient physical therapy to address the deficits listed in the problem list box on initial evaluation, provide pt/family education and to maximize pt's level of independence in the home and community environment.     Pt's spiritual, cultural and educational needs considered and pt agreeable to plan of care and goals.     Anticipated barriers to physical therapy: pain/ attendance due to work obligations    Goals: STG's 2 weeks  1. Patient will be independent with 50% of HEP Met 6/23/22     LTG's 10 weeks  1. Patient will improve FOTO disability score  to 28 %  disability or less in order to improve overall QOL & return to PLOF   2. Patient will report an overall decrease in pain with ADL's and functional  mobility  3. Patient will increase strength by at least 1/2 muscle grade in affected musculature to improve functional mobility  4. Patient will improve ROM in L knee by at least 5 degrees to improve functional mobility and ADL's  5. Patient will be more aware of posture throughout the day to reduce stress  and maintain optimal alignment of the spine Progressing  6. Patient will be independent with HEP and pain management    PLAN     Plan of care Certification: 5/20/2022 to 7/29/22.     Outpatient Physical Therapy 2 times weekly for 10 weeks to include the following interventions: Electrical Stimulation PRN, Manual Therapy, Moist Heat/ Ice, Patient Education, Therapeutic Exercise and Ultrasound, ASTYM, Kinesiotaping PRN, Functional Dry Needling    Lubna Antonio, PT

## 2022-07-07 ENCOUNTER — CLINICAL SUPPORT (OUTPATIENT)
Dept: REHABILITATION | Facility: HOSPITAL | Age: 52
End: 2022-07-07
Payer: COMMERCIAL

## 2022-07-07 DIAGNOSIS — R53.1 WEAKNESS: Primary | ICD-10-CM

## 2022-07-07 DIAGNOSIS — R52 PAIN: ICD-10-CM

## 2022-07-07 PROCEDURE — 97110 THERAPEUTIC EXERCISES: CPT | Mod: CQ

## 2022-07-07 NOTE — PROGRESS NOTES
"[facility]   Physical Therapist Assistant Treatment Note     Name: Mary Ozuna [place]  Clinic Number: [id number]    Therapy Diagnosis:   Encounter Diagnoses   Name Primary?    Weakness Yes    Pain      Physician: Debra Wu MD    Visit Date: 7/7/2022    Physician Orders: PT Eval and Treat   Medical Diagnosis from Referral: Chronic pain of both knees   Evaluation Date: 5/20/2022  Authorization Period Expiration: 05/12/2023  Plan of Care Expiration: 7/29/22  Visit # / Visits authorized: 5/ 20 + eval  FOTO: 1/3  Precautions: Standard, BP    PTA Visit #: 1/5     Time In:4:35  Time Out: 5:23  Total Billable Time: 48 minutes    SUBJECTIVE     Pt reports: she did a lot of work moving things yesterday  She was compliant with home exercise program.  Response to previous treatment: soreness  Functional change: tolerating more activities    Pain:4- 5/10  Location: L knee    OBJECTIVE     Objective Measures updated at progress report unless specified.     Treatment     Mary received the treatments listed below:      therapeutic exercises to develop strength, endurance, ROM, flexibility, posture and core stabilization for 48 minutes including:      Bike for ROM, strength, and endurance x 8 minutes    TG 6 bands 3 minutes DL/ heel raises 2 x 15 reps  Standing controlled knee flexion into knee extension without hyper extension GTB x10 ea leg  Step ups 1 x 10 reps B forward  Rebounder overhead with yellow weighted ball with alternating step pattern  Pushing sled with no added weight  Supine dynamic hamstring lengthening B  Bridges GTB keeping glutes engaged raising and lowering 2 x 10 reps   Single leg bridges with CL limb in 90/90 2 x 5 reps ea leg  Side lying backward circles 2x15 ea leg--cues for alignment  Dead bugs as tolerated--cues for technique!  Modified planks on forearms and knees 2x28"      Deferred  Heel taps off of 2 in step 1 x 10 reps B  TRX mini squats 2 x 15 reps  Standing " TKE's 1 x 20 reps B  SLR phase 1, 2, 3 1 x 10 reps B  qs with VMO isolation 2 x 15 reps  Hip adduction 40# 2 x 15 reps  Hip abduction 40# 2 x 15 reps    deferred  Ball in and outs 2 x 10 reps  Ball squeezes 1 x 20 reps  Hip abduction with G TB 2 x 15 reps  Prone hamstring curls with G TB 2 x 15 reps  Braiding  Tail gaiters 3# 3 minutes   LAQ's 3# 2 x 10 reps B        Patient Education and Home Exercises     Home Exercises Provided and Patient Education Provided     Education provided:   - HEP reviewed  - reviewed importance of a balanced diet/ regular exercise  - added SLR phase 2 & 3 and bridges to HEP 6/2/22  - posture education in sitting and standing    Written Home Exercises Provided: Patient instructed to cont prior HEP. Exercises were reviewed and Mary was able to demonstrate them prior to the end of the session.  Mary demonstrated good  understanding of the education provided. See EMR under Patient Instructions for exercises provided during therapy sessions    ASSESSMENT     Mary presented today with mild to moderate knee pain. She may have some mild inflammation from yesterdays work load. Today she continued with strengthening and stabilization activities as she continues to have impairments. Moderate cueing with core work. Step ups and downs as well as squat taps caused knee pain>>activity stopped. Overall Mary demonstrated a positive response to her session today with improvements noted however continued weakness both in her anterior and posterior core .   Mary Is progressing well towards her goals.   Pt prognosis is Fair.     Pt will continue to benefit from skilled outpatient physical therapy to address the deficits listed in the problem list box on initial evaluation, provide pt/family education and to maximize pt's level of independence in the home and community environment.     Pt's spiritual, cultural and educational needs considered and pt agreeable to plan of care and goals.      Anticipated barriers to physical therapy: pain/ attendance due to work obligations    Goals: STG's 2 weeks  1. Patient will be independent with 50% of HEP Met 6/23/22     LTG's 10 weeks  1. Patient will improve FOTO disability score  to 28 %  disability or less in order to improve overall QOL & return to PLOF   2. Patient will report an overall decrease in pain with ADL's and functional mobility  3. Patient will increase strength by at least 1/2 muscle grade in affected musculature to improve functional mobility  4. Patient will improve ROM in L knee by at least 5 degrees to improve functional mobility and ADL's  5. Patient will be more aware of posture throughout the day to reduce stress  and maintain optimal alignment of the spine Progressing  6. Patient will be independent with HEP and pain management    PLAN     Plan of care Certification: 5/20/2022 to 7/29/22.     Outpatient Physical Therapy 2 times weekly for 10 weeks to include the following interventions: Electrical Stimulation PRN, Manual Therapy, Moist Heat/ Ice, Patient Education, Therapeutic Exercise and Ultrasound, ASTYM, Kinesiotaping PRN, Functional Dry Needling    Arleen Hicks, PTA

## 2022-07-15 ENCOUNTER — CLINICAL SUPPORT (OUTPATIENT)
Dept: REHABILITATION | Facility: HOSPITAL | Age: 52
End: 2022-07-15
Payer: COMMERCIAL

## 2022-07-15 DIAGNOSIS — R53.1 WEAKNESS: Primary | ICD-10-CM

## 2022-07-15 DIAGNOSIS — R52 PAIN: ICD-10-CM

## 2022-07-15 PROCEDURE — 97110 THERAPEUTIC EXERCISES: CPT | Mod: CQ

## 2022-07-15 NOTE — PROGRESS NOTES
"[facility]   Physical Therapist Assistant Treatment Note     Name: Mary Ozuna Russo  Clinic Number: [id number]    Therapy Diagnosis:   Encounter Diagnoses   Name Primary?    Weakness Yes    Pain      Physician: Debra Wu MD    Visit Date: 7/15/2022    Physician Orders: PT Eval and Treat   Medical Diagnosis from Referral: Chronic pain of both knees   Evaluation Date: 5/20/2022  Authorization Period Expiration: 05/12/2023  Plan of Care Expiration: 7/29/22  Visit # / Visits authorized: 6/ 20 + eval    FOTO: 2/3    Precautions: Standard, BP    PTA Visit #: 2/5     Time In: 1:50 (patient late)  Time Out: 2:35  Total Billable Time: 40 minutes    SUBJECTIVE     Pt reports: doing "ok"  She was compliant with home exercise program.  Response to previous treatment: soreness  Functional change: tolerating more activities    Pain:4/10  Location: L knee    OBJECTIVE     Objective Measures updated at progress report unless specified.     Treatment     Mary received the treatments listed below:      therapeutic exercises to develop strength, endurance, ROM, flexibility, posture and core stabilization for 40 minutes including:    Bike for ROM, strength, and endurance x 5 minutes    Shuttle 6 bands  DL w/arm press ups 3# x10 ; half range w/arm press ups 3# x20  Shuttle LLE 4 bands w/arm press ups 3# x20  Shuttle heel raises 4 bands x20 reps  Tajik squats to 24" and 22" BTB x10 ea  L Step ups w/adduction pull BTB 1 x 10 reps   Pushing sled with no added weight 4 laps  4" L step ups with R hip drive 2x10--noted increased L ankle instability  Arch doming x15 ea foot  L Ankle DF/EV GTB x10     Add in LAQ isometrics next visit      DEFERRED:  Rebounder overhead with yellow weighted ball with alternating step pattern  Supine dynamic hamstring lengthening B  Bridges GTB keeping glutes engaged raising and lowering 2 x 10 reps   Single leg bridges with CL limb in 90/90 2 x 5 reps ea leg  Side " "lying backward circles 2x15 ea leg--cues for alignment  Dead bugs as tolerated--cues for technique!  Modified planks on forearms and knees 2x28"          Patient Education and Home Exercises     Home Exercises Provided and Patient Education Provided     Education provided:   - HEP reviewed  - reviewed importance of a balanced diet/ regular exercise  - added SLR phase 2 & 3 and bridges to HEP 6/2/22  - posture education in sitting and standing    Written Home Exercises Provided: Patient instructed to cont prior HEP with addition of arch doming and ankle DF/EV GTB 7/15/22. Exercises were reviewed and Mary was able to demonstrate them prior to the end of the session.  Mary demonstrated good  understanding of the education provided. See EMR under Patient Instructions for exercises provided during therapy sessions    ASSESSMENT     Mary presented today with mild knee pain. Today she continued with strengthening and stabilization activities as she continues to have impairments. Moderate cueing with core work. Step downs as well as squat taps continue to cause knee pain therefore will add in LAQ isometrics and weighted limited range LAQ going forward . Also worked on arch doming and phase I ankle strengthening as Mary has no arch support and her ankle is mildly unstable with dynamic activities as noted today. This will increase stress moving up the chain.  Overall Mary demonstrated a positive response to her session today with improvements noted however continued weakness both in her anterior and posterior core .   Mary Is progressing well towards her goals.   Pt prognosis is Fair.     Pt will continue to benefit from skilled outpatient physical therapy to address the deficits listed in the problem list box on initial evaluation, provide pt/family education and to maximize pt's level of independence in the home and community environment.     Pt's spiritual, cultural and educational needs considered " and pt agreeable to plan of care and goals.     Anticipated barriers to physical therapy: pain/ attendance due to work obligations    Goals: STG's 2 weeks  1. Patient will be independent with 50% of HEP Met 6/23/22     LTG's 10 weeks  1. Patient will improve FOTO disability score  to 28 %  disability or less in order to improve overall QOL & return to PLOF   2. Patient will report an overall decrease in pain with ADL's and functional mobility  3. Patient will increase strength by at least 1/2 muscle grade in affected musculature to improve functional mobility  4. Patient will improve ROM in L knee by at least 5 degrees to improve functional mobility and ADL's  5. Patient will be more aware of posture throughout the day to reduce stress  and maintain optimal alignment of the spine Progressing  6. Patient will be independent with HEP and pain management    PLAN     Plan of care Certification: 5/20/2022 to 7/29/22.     Outpatient Physical Therapy 2 times weekly for 10 weeks to include the following interventions: Electrical Stimulation PRN, Manual Therapy, Moist Heat/ Ice, Patient Education, Therapeutic Exercise and Ultrasound, ASTYM, Kinesiotaping PRN, Functional Dry Needling    Arleen Hicks, PTA

## 2022-07-22 ENCOUNTER — CLINICAL SUPPORT (OUTPATIENT)
Dept: REHABILITATION | Facility: HOSPITAL | Age: 52
End: 2022-07-22
Payer: COMMERCIAL

## 2022-07-22 DIAGNOSIS — R53.1 WEAKNESS: Primary | ICD-10-CM

## 2022-07-22 DIAGNOSIS — R52 PAIN: ICD-10-CM

## 2022-07-22 PROCEDURE — 97110 THERAPEUTIC EXERCISES: CPT

## 2022-07-22 NOTE — PROGRESS NOTES
[facility]   Physical Therapist  Treatment Note     Name: Mary Ozuna Russo  Clinic Number: [id number]    Therapy Diagnosis:   Encounter Diagnoses   Name Primary?    Weakness Yes    Pain      Physician: Debra Wu MD    Visit Date: 7/22/2022    Physician Orders: PT Eval and Treat   Medical Diagnosis from Referral: Chronic pain of both knees   Evaluation Date: 5/20/2022  Authorization Period Expiration: 05/12/2023  Plan of Care Expiration: 7/29/22  Visit # / Visits authorized: 7/ 20 + eval    FOTO: 3/3    Precautions: Standard, BP    PTA Visit # 0/5     Time In: 2:20  Time Out: 3:15  Total Billable Time: 55 minutes    SUBJECTIVE     Pt reports: she had a lot of popping with pain in L knee last week. Pain and popping has subsided. Wearing knee brace helped patient last week. Today she reports mainly stiffness in L knee. Patient has lost 50# total since 1/1/22 and since starting therapy about 30#.   She was compliant with home exercise program.  Response to previous treatment: soreness  Functional change: tolerating more activities    Pain:4/10  Location: L knee    OBJECTIVE     7/22/22 FOTO completed  7/22/22 L Knee AROM 0 - 120    Treatment     Mary received the treatments listed below:      therapeutic exercises to develop strength, endurance, ROM, flexibility, posture and core stabilization for 55 minutes including:   Elliptical for ROM, strength, and endurance x 6 min  AROM L knee   leg press 45# 2 x 15 reps   Hip abduction 45# 2 x 15 reps  Leg curls 40# 2 x 15 reps  Leg extension 10# 2 x 15 reps  Hip extension with G TB 2 x 10 reps  (TB use for hip flexion/ abduction discussed)  HEP reviewed in detail/ good body mechanics discussed while performing exercises  TRX mini squats 2 x 10 reps                  Patient Education and Home Exercises     Home Exercises Provided and Patient Education Provided     Education provided:   - HEP reviewed in detail  - reviewed importance of  a balanced diet/ regular exercise      Written Home Exercises Provided: Patient instructed to cont prior HEP with addition of arch doming and ankle DF/EV GTB 7/15/22. Exercises were reviewed and Mary was able to demonstrate them prior to the end of the session.  Mary demonstrated good  understanding of the education provided. See EMR under Patient Instructions for exercises provided during therapy sessions    ASSESSMENT     Mary presented today with mild knee pain. She is responding well to treatment with weight loss and increase functional mobility.  L Knee AROM 0 - 120 with no pain complaints (110 on IE). She is progressing with exercises. Treatment focused on LE strengthening and good body mechanics. Questions and concerns addressed. Patient is progressing towards independence with HEP. Patient was tired after the session.  Patient would continue to benefit from skilled PT intervention for strengthening and education.             Mary Is progressing well towards her goals.   Pt prognosis is Fair.     Pt will continue to benefit from skilled outpatient physical therapy to address the deficits listed in the problem list box on initial evaluation, provide pt/family education and to maximize pt's level of independence in the home and community environment.     Pt's spiritual, cultural and educational needs considered and pt agreeable to plan of care and goals.     Anticipated barriers to physical therapy: pain/ attendance due to work obligations    Goals: STG's 2 weeks  1. Patient will be independent with 50% of HEP Met 6/23/22     LTG's 10 weeks  1. Patient will improve FOTO disability score  to 28 %  disability or less in order to improve overall QOL & return to PLOF 7/7/22 36%/ 7/22/22 37%  2. Patient will report an overall decrease in pain with ADL's and functional mobility Progressing  3. Patient will increase strength by at least 1/2 muscle grade in affected musculature to improve functional  mobility  4. Patient will improve ROM in L knee by at least 5 degrees to improve functional mobility and ADL's 7/22/22 MET  5. Patient will be more aware of posture throughout the day to reduce stress  and maintain optimal alignment of the spine Progressing  6. Patient will be independent with HEP and pain management Progressing    PLAN     Plan of care Certification: 5/20/2022 to 7/29/22.     Outpatient Physical Therapy 2 times weekly for 10 weeks to include the following interventions: Electrical Stimulation PRN, Manual Therapy, Moist Heat/ Ice, Patient Education, Therapeutic Exercise and Ultrasound, ASTYM, Kinesiotaping PRN, Functional Dry Needling    Lubna Antonio, PT

## 2022-08-02 ENCOUNTER — OFFICE VISIT (OUTPATIENT)
Dept: INTERNAL MEDICINE | Facility: CLINIC | Age: 52
End: 2022-08-02
Payer: COMMERCIAL

## 2022-08-02 VITALS
TEMPERATURE: 97 F | OXYGEN SATURATION: 99 % | WEIGHT: 267.44 LBS | SYSTOLIC BLOOD PRESSURE: 128 MMHG | BODY MASS INDEX: 42.98 KG/M2 | HEART RATE: 98 BPM | HEIGHT: 66 IN | DIASTOLIC BLOOD PRESSURE: 70 MMHG

## 2022-08-02 DIAGNOSIS — R42 VERTIGO: Primary | ICD-10-CM

## 2022-08-02 PROCEDURE — 3008F PR BODY MASS INDEX (BMI) DOCUMENTED: ICD-10-PCS | Mod: CPTII,S$GLB,, | Performed by: INTERNAL MEDICINE

## 2022-08-02 PROCEDURE — 99214 OFFICE O/P EST MOD 30 MIN: CPT | Mod: S$GLB,,, | Performed by: INTERNAL MEDICINE

## 2022-08-02 PROCEDURE — 3074F PR MOST RECENT SYSTOLIC BLOOD PRESSURE < 130 MM HG: ICD-10-PCS | Mod: CPTII,S$GLB,, | Performed by: INTERNAL MEDICINE

## 2022-08-02 PROCEDURE — 3074F SYST BP LT 130 MM HG: CPT | Mod: CPTII,S$GLB,, | Performed by: INTERNAL MEDICINE

## 2022-08-02 PROCEDURE — 3078F DIAST BP <80 MM HG: CPT | Mod: CPTII,S$GLB,, | Performed by: INTERNAL MEDICINE

## 2022-08-02 PROCEDURE — 99999 PR PBB SHADOW E&M-EST. PATIENT-LVL V: ICD-10-PCS | Mod: PBBFAC,,, | Performed by: INTERNAL MEDICINE

## 2022-08-02 PROCEDURE — 3078F PR MOST RECENT DIASTOLIC BLOOD PRESSURE < 80 MM HG: ICD-10-PCS | Mod: CPTII,S$GLB,, | Performed by: INTERNAL MEDICINE

## 2022-08-02 PROCEDURE — 99999 PR PBB SHADOW E&M-EST. PATIENT-LVL V: CPT | Mod: PBBFAC,,, | Performed by: INTERNAL MEDICINE

## 2022-08-02 PROCEDURE — 1159F PR MEDICATION LIST DOCUMENTED IN MEDICAL RECORD: ICD-10-PCS | Mod: CPTII,S$GLB,, | Performed by: INTERNAL MEDICINE

## 2022-08-02 PROCEDURE — 3008F BODY MASS INDEX DOCD: CPT | Mod: CPTII,S$GLB,, | Performed by: INTERNAL MEDICINE

## 2022-08-02 PROCEDURE — 1160F RVW MEDS BY RX/DR IN RCRD: CPT | Mod: CPTII,S$GLB,, | Performed by: INTERNAL MEDICINE

## 2022-08-02 PROCEDURE — 1159F MED LIST DOCD IN RCRD: CPT | Mod: CPTII,S$GLB,, | Performed by: INTERNAL MEDICINE

## 2022-08-02 PROCEDURE — 1160F PR REVIEW ALL MEDS BY PRESCRIBER/CLIN PHARMACIST DOCUMENTED: ICD-10-PCS | Mod: CPTII,S$GLB,, | Performed by: INTERNAL MEDICINE

## 2022-08-02 PROCEDURE — 99214 PR OFFICE/OUTPT VISIT, EST, LEVL IV, 30-39 MIN: ICD-10-PCS | Mod: S$GLB,,, | Performed by: INTERNAL MEDICINE

## 2022-08-02 RX ORDER — MECLIZINE HCL 12.5 MG 12.5 MG/1
12.5 TABLET ORAL 3 TIMES DAILY PRN
Qty: 30 TABLET | Refills: 0 | Status: SHIPPED | OUTPATIENT
Start: 2022-08-02 | End: 2023-11-16

## 2022-08-02 RX ORDER — METOPROLOL SUCCINATE 50 MG/1
50 TABLET, EXTENDED RELEASE ORAL DAILY
COMMUNITY
Start: 2022-01-05 | End: 2023-05-10

## 2022-08-02 NOTE — PROGRESS NOTES
Subjective:       Patient ID: Mary Russo is a 52 y.o. female.    Chief Complaint: Dizziness    Dizziness:   Chronicity:  New  Onset:  In the past 7 days  Progression since onset:  Gradually improving  Frequency:  Every few days  Severity:  Moderate  Dizziness characteristics:  Off-balance and sensation of movement   Associated symptoms: nausea.no hearing loss, no ear congestion, no ear pain, no fever, no headaches, no tinnitus, no vomiting, no weakness, no visual disturbances, no syncope, no palpitations and no chest pain.  Aggravated by:  Position changes  Treatments tried:  Rest  Improvements on treatment:  Significant   PMH includes: environmental allergies.no head trauma and no head trauma.    Review of Systems   Constitutional: Negative for activity change, fever and unexpected weight change.   HENT: Positive for sinus pressure/congestion. Negative for ear pain, hearing loss, rhinorrhea, tinnitus and trouble swallowing.    Eyes: Negative for discharge and visual disturbance.   Respiratory: Negative for chest tightness and wheezing.    Cardiovascular: Negative for chest pain, palpitations and syncope.   Gastrointestinal: Positive for nausea. Negative for blood in stool, constipation, diarrhea and vomiting.   Endocrine: Negative for polydipsia and polyuria.   Genitourinary: Negative for difficulty urinating, dysuria, hematuria and menstrual problem.   Musculoskeletal: Positive for arthralgias. Negative for joint swelling and neck pain.   Allergic/Immunologic: Positive for environmental allergies.   Neurological: Positive for dizziness. Negative for weakness and headaches.   Psychiatric/Behavioral: Negative for confusion and dysphoric mood.         Objective:      Physical Exam  Vitals reviewed.   Constitutional:       General: She is not in acute distress.     Appearance: She is well-developed. She is not ill-appearing.   HENT:      Right Ear: There is no impacted cerumen.      Ears:      Comments:  Mild fluid in mid ears bilaterally   Eyes:      General: No scleral icterus.     Extraocular Movements: Extraocular movements intact.      Pupils: Pupils are equal, round, and reactive to light.   Cardiovascular:      Rate and Rhythm: Normal rate and regular rhythm.      Heart sounds: Normal heart sounds.   Pulmonary:      Effort: Pulmonary effort is normal. No respiratory distress.      Breath sounds: Normal breath sounds. No wheezing or rales.   Skin:     General: Skin is warm and dry.   Neurological:      Mental Status: She is alert and oriented to person, place, and time.      Sensory: Sensation is intact.      Motor: Motor function is intact.   Psychiatric:         Behavior: Behavior normal.         Assessment:       Problem List Items Addressed This Visit    None     Visit Diagnoses     Vertigo    -  Primary    Relevant Medications    meclizine (ANTIVERT) 12.5 mg tablet          Plan:       Mary was seen today for dizziness.    Diagnoses and all orders for this visit:    Vertigo  -     meclizine (ANTIVERT) 12.5 mg tablet; Take 1 tablet (12.5 mg total) by mouth 3 (three) times daily as needed for Dizziness. Discussed medication risks and benefits.     Handout provided.     RTC if symptoms worsen or fail to resolve with treatment.

## 2022-08-04 DIAGNOSIS — K21.9 GASTROESOPHAGEAL REFLUX DISEASE WITHOUT ESOPHAGITIS: ICD-10-CM

## 2022-08-05 RX ORDER — PANTOPRAZOLE SODIUM 40 MG/1
TABLET, DELAYED RELEASE ORAL
Qty: 90 TABLET | Refills: 0 | Status: SHIPPED | OUTPATIENT
Start: 2022-08-05 | End: 2023-07-20

## 2022-08-11 ENCOUNTER — CLINICAL SUPPORT (OUTPATIENT)
Dept: REHABILITATION | Facility: HOSPITAL | Age: 52
End: 2022-08-11
Payer: COMMERCIAL

## 2022-08-11 DIAGNOSIS — R52 PAIN: ICD-10-CM

## 2022-08-11 DIAGNOSIS — R53.1 WEAKNESS: Primary | ICD-10-CM

## 2022-08-11 PROCEDURE — 97110 THERAPEUTIC EXERCISES: CPT

## 2022-08-11 NOTE — PROGRESS NOTES
[facility]   Physical Therapist  Treatment Note / D/C summary    Name: Mary Wrighthoun  Clinic Number: [id number]    Therapy Diagnosis:   Encounter Diagnoses   Name Primary?    Weakness Yes    Pain      Physician: Debra Wu MD    Visit Date: 8/11/2022    Physician Orders: PT Eval and Treat   Medical Diagnosis from Referral: Chronic pain of both knees   Evaluation Date: 5/20/2022  Authorization Period Expiration: 05/12/2023  Plan of Care Expiration: 7/29/22  Visit # / Visits authorized:8/ 20 + eval    FOTO: 4/4    Precautions: Standard, BP    PTA Visit # 0/5     Time In: 5:38  Time Out: 6:22  Total Billable Time: 44 minutes    SUBJECTIVE     Pt reports: she had an episode of vertigo last week due to inner ear infection.    She was compliant with home exercise program.  Response to previous treatment: soreness  Functional change: tolerating more activities/ stronger/ increase function     Pain:4/10  Location: L knee    OBJECTIVE   8/11/22 MMT hip abduction 5/5 B, knee flexion 5/5 B, knee extension 5/5 B, PF 4/5 B    8/11/22 FOTO completed  7/22/22 FOTO completed  7/22/22 L Knee AROM 0 - 120    Treatment     Mary received the treatments listed below:      therapeutic exercises to develop strength, endurance, ROM, flexibility, posture and core stabilization for 44 minutes including:  Nustep for ROM and strength and endurance x 8  L:4  MMT   HEP reviewed/ proper mechanics  Progress and goals discussed   leg press 45# 2 x 15 reps   Hip abduction 45# 2 x 15 reps  Leg curls 40# 2 x 15 reps  Leg extension 10# 2 x 15 reps      Patient Education and Home Exercises     Home Exercises Provided and Patient Education Provided     Education provided:   - HEP reviewed in detail  - reviewed importance of a balanced diet/ regular exercise  - Green TB issued   - Progress and goals reviewed      Written Home Exercises Provided: Patient instructed to cont prior HEP with addition of arch doming  and ankle DF/EV GTB 7/15/22. Exercises were reviewed and Mary was able to demonstrate them prior to the end of the session.  Mary demonstrated good  understanding of the education provided. See EMR under Patient Instructions for exercises provided during therapy sessions    ASSESSMENT     Mary presents today with mild knee pain. She is responding well to treatment with weight loss and increase functional mobility, ROM,  and strength.  L Knee AROM 0 - 120 with no pain complaints (110 on IE). MMT: hip abduction 5/5 B, knee flexion 5/5 B, knee extension 5/5 B, PF 4/5 B. Patient is being d/c from PT services and patient is in agreement. She is independent with HEP. She has met all goals established on IE except for her FOTO score goal which shows improvement. See below regarding goals.          Goals: STG's 2 weeks  1. Patient will be independent with 50% of HEP Met 6/23/22     LTG's 10 weeks  1. Patient will improve FOTO disability score  to 28 %  disability or less in order to improve overall QOL & return to PLOF 7/7/22 36%/ 7/22/22 37%/ 8/11/22 35%  2. Patient will report an overall decrease in pain with ADL's and functional mobility 8/11/22 met  3. Patient will increase strength by at least 1/2 muscle grade in affected musculature to improve functional mobility 8/11/22 MET  4. Patient will improve ROM in L knee by at least 5 degrees to improve functional mobility and ADL's 7/22/22 MET  5. Patient will be more aware of posture throughout the day to reduce stress  and maintain optimal alignment of the spine 8/11/22 Met   6. Patient will be independent with HEP and pain management 8/11/22 MET    PLAN     Patient d/c from PT services      Lubna Antonio, PT

## 2022-08-23 ENCOUNTER — PATIENT MESSAGE (OUTPATIENT)
Dept: ADMINISTRATIVE | Facility: OTHER | Age: 52
End: 2022-08-23
Payer: COMMERCIAL

## 2022-10-03 ENCOUNTER — PATIENT MESSAGE (OUTPATIENT)
Dept: ADMINISTRATIVE | Facility: HOSPITAL | Age: 52
End: 2022-10-03
Payer: COMMERCIAL

## 2022-11-10 LAB — HEMOCCULT STL QL IA: NORMAL

## 2022-12-12 ENCOUNTER — TELEPHONE (OUTPATIENT)
Dept: ORTHOPEDICS | Facility: CLINIC | Age: 52
End: 2022-12-12
Payer: COMMERCIAL

## 2022-12-12 NOTE — TELEPHONE ENCOUNTER
Called regarding upcoming appt. Pt stated that she was recommended joint replacement surgery within the next ten years. Pt denied having an MRI. I explained that Dr. Ellis is an orthopedic surgeon but does not perform joint replacements. Rescheduled w/ non op. Pt declined soonest available, stating that she wanted to be seen in January. Pt understood new appt date, time, location, provider, and to arrive 30 min early for xr.

## 2022-12-15 ENCOUNTER — IMMUNIZATION (OUTPATIENT)
Dept: INTERNAL MEDICINE | Facility: CLINIC | Age: 52
End: 2022-12-15
Payer: COMMERCIAL

## 2022-12-15 PROCEDURE — 90471 FLU VACCINE (QUAD) GREATER THAN OR EQUAL TO 3YO PRESERVATIVE FREE IM: ICD-10-PCS | Mod: S$GLB,,, | Performed by: FAMILY MEDICINE

## 2022-12-15 PROCEDURE — 90686 IIV4 VACC NO PRSV 0.5 ML IM: CPT | Mod: S$GLB,,, | Performed by: FAMILY MEDICINE

## 2022-12-15 PROCEDURE — 90686 FLU VACCINE (QUAD) GREATER THAN OR EQUAL TO 3YO PRESERVATIVE FREE IM: ICD-10-PCS | Mod: S$GLB,,, | Performed by: FAMILY MEDICINE

## 2022-12-15 PROCEDURE — 90471 IMMUNIZATION ADMIN: CPT | Mod: S$GLB,,, | Performed by: FAMILY MEDICINE

## 2022-12-30 ENCOUNTER — IMMUNIZATION (OUTPATIENT)
Dept: PHARMACY | Facility: CLINIC | Age: 52
End: 2022-12-30
Payer: COMMERCIAL

## 2022-12-30 DIAGNOSIS — Z23 NEED FOR VACCINATION: Primary | ICD-10-CM

## 2023-01-03 ENCOUNTER — OFFICE VISIT (OUTPATIENT)
Dept: ORTHOPEDICS | Facility: CLINIC | Age: 53
End: 2023-01-03
Payer: COMMERCIAL

## 2023-01-03 ENCOUNTER — HOSPITAL ENCOUNTER (OUTPATIENT)
Dept: RADIOLOGY | Facility: HOSPITAL | Age: 53
Discharge: HOME | End: 2023-01-03
Attending: NURSE PRACTITIONER
Payer: COMMERCIAL

## 2023-01-03 ENCOUNTER — OFFICE VISIT (OUTPATIENT)
Dept: OBSTETRICS AND GYNECOLOGY | Facility: CLINIC | Age: 53
End: 2023-01-03
Payer: COMMERCIAL

## 2023-01-03 VITALS — WEIGHT: 259.25 LBS | HEIGHT: 66 IN | BODY MASS INDEX: 41.66 KG/M2

## 2023-01-03 VITALS
SYSTOLIC BLOOD PRESSURE: 125 MMHG | BODY MASS INDEX: 41.95 KG/M2 | HEART RATE: 75 BPM | WEIGHT: 261 LBS | DIASTOLIC BLOOD PRESSURE: 83 MMHG | HEIGHT: 66 IN

## 2023-01-03 DIAGNOSIS — Z12.39 BREAST SCREENING: ICD-10-CM

## 2023-01-03 DIAGNOSIS — Z11.3 SCREEN FOR STD (SEXUALLY TRANSMITTED DISEASE): ICD-10-CM

## 2023-01-03 DIAGNOSIS — M17.0 PRIMARY OSTEOARTHRITIS OF BOTH KNEES: Primary | ICD-10-CM

## 2023-01-03 DIAGNOSIS — Z12.4 PAPANICOLAOU SMEAR FOR CERVICAL CANCER SCREENING: ICD-10-CM

## 2023-01-03 DIAGNOSIS — Z01.419 ROUTINE GYNECOLOGICAL EXAMINATION: Primary | ICD-10-CM

## 2023-01-03 PROCEDURE — 3008F PR BODY MASS INDEX (BMI) DOCUMENTED: ICD-10-PCS | Mod: CPTII,S$GLB,, | Performed by: STUDENT IN AN ORGANIZED HEALTH CARE EDUCATION/TRAINING PROGRAM

## 2023-01-03 PROCEDURE — 77067 SCR MAMMO BI INCL CAD: CPT | Mod: 26,,, | Performed by: RADIOLOGY

## 2023-01-03 PROCEDURE — 3008F BODY MASS INDEX DOCD: CPT | Mod: CPTII,S$GLB,, | Performed by: STUDENT IN AN ORGANIZED HEALTH CARE EDUCATION/TRAINING PROGRAM

## 2023-01-03 PROCEDURE — 3079F DIAST BP 80-89 MM HG: CPT | Mod: CPTII,S$GLB,, | Performed by: STUDENT IN AN ORGANIZED HEALTH CARE EDUCATION/TRAINING PROGRAM

## 2023-01-03 PROCEDURE — 3008F PR BODY MASS INDEX (BMI) DOCUMENTED: ICD-10-PCS | Mod: CPTII,S$GLB,, | Performed by: NURSE PRACTITIONER

## 2023-01-03 PROCEDURE — 99396 PR PREVENTIVE VISIT,EST,40-64: ICD-10-PCS | Mod: S$GLB,,, | Performed by: NURSE PRACTITIONER

## 2023-01-03 PROCEDURE — 99204 PR OFFICE/OUTPT VISIT, NEW, LEVL IV, 45-59 MIN: ICD-10-PCS | Mod: 25,S$GLB,, | Performed by: STUDENT IN AN ORGANIZED HEALTH CARE EDUCATION/TRAINING PROGRAM

## 2023-01-03 PROCEDURE — 77063 BREAST TOMOSYNTHESIS BI: CPT | Mod: 26,,, | Performed by: RADIOLOGY

## 2023-01-03 PROCEDURE — 88175 CYTOPATH C/V AUTO FLUID REDO: CPT | Performed by: NURSE PRACTITIONER

## 2023-01-03 PROCEDURE — 99999 PR PBB SHADOW E&M-EST. PATIENT-LVL IV: ICD-10-PCS | Mod: PBBFAC,,, | Performed by: STUDENT IN AN ORGANIZED HEALTH CARE EDUCATION/TRAINING PROGRAM

## 2023-01-03 PROCEDURE — 1160F RVW MEDS BY RX/DR IN RCRD: CPT | Mod: CPTII,S$GLB,, | Performed by: NURSE PRACTITIONER

## 2023-01-03 PROCEDURE — 99999 PR PBB SHADOW E&M-EST. PATIENT-LVL III: ICD-10-PCS | Mod: PBBFAC,,, | Performed by: NURSE PRACTITIONER

## 2023-01-03 PROCEDURE — 87624 HPV HI-RISK TYP POOLED RSLT: CPT | Performed by: NURSE PRACTITIONER

## 2023-01-03 PROCEDURE — 3079F PR MOST RECENT DIASTOLIC BLOOD PRESSURE 80-89 MM HG: ICD-10-PCS | Mod: CPTII,S$GLB,, | Performed by: STUDENT IN AN ORGANIZED HEALTH CARE EDUCATION/TRAINING PROGRAM

## 2023-01-03 PROCEDURE — 99204 OFFICE O/P NEW MOD 45 MIN: CPT | Mod: 25,S$GLB,, | Performed by: STUDENT IN AN ORGANIZED HEALTH CARE EDUCATION/TRAINING PROGRAM

## 2023-01-03 PROCEDURE — 1160F PR REVIEW ALL MEDS BY PRESCRIBER/CLIN PHARMACIST DOCUMENTED: ICD-10-PCS | Mod: CPTII,S$GLB,, | Performed by: NURSE PRACTITIONER

## 2023-01-03 PROCEDURE — 1159F PR MEDICATION LIST DOCUMENTED IN MEDICAL RECORD: ICD-10-PCS | Mod: CPTII,S$GLB,, | Performed by: NURSE PRACTITIONER

## 2023-01-03 PROCEDURE — 1159F MED LIST DOCD IN RCRD: CPT | Mod: CPTII,S$GLB,, | Performed by: NURSE PRACTITIONER

## 2023-01-03 PROCEDURE — 77063 MAMMO DIGITAL SCREENING BILAT WITH TOMO: ICD-10-PCS | Mod: 26,,, | Performed by: RADIOLOGY

## 2023-01-03 PROCEDURE — 99999 PR PBB SHADOW E&M-EST. PATIENT-LVL III: CPT | Mod: PBBFAC,,, | Performed by: NURSE PRACTITIONER

## 2023-01-03 PROCEDURE — 77067 SCR MAMMO BI INCL CAD: CPT | Mod: TC

## 2023-01-03 PROCEDURE — 3074F SYST BP LT 130 MM HG: CPT | Mod: CPTII,S$GLB,, | Performed by: STUDENT IN AN ORGANIZED HEALTH CARE EDUCATION/TRAINING PROGRAM

## 2023-01-03 PROCEDURE — 77067 MAMMO DIGITAL SCREENING BILAT WITH TOMO: ICD-10-PCS | Mod: 26,,, | Performed by: RADIOLOGY

## 2023-01-03 PROCEDURE — 3074F PR MOST RECENT SYSTOLIC BLOOD PRESSURE < 130 MM HG: ICD-10-PCS | Mod: CPTII,S$GLB,, | Performed by: STUDENT IN AN ORGANIZED HEALTH CARE EDUCATION/TRAINING PROGRAM

## 2023-01-03 PROCEDURE — 20610 LARGE JOINT ASPIRATION/INJECTION: BILATERAL KNEE: ICD-10-PCS | Mod: 50,S$GLB,, | Performed by: STUDENT IN AN ORGANIZED HEALTH CARE EDUCATION/TRAINING PROGRAM

## 2023-01-03 PROCEDURE — 99396 PREV VISIT EST AGE 40-64: CPT | Mod: S$GLB,,, | Performed by: NURSE PRACTITIONER

## 2023-01-03 PROCEDURE — 87591 N.GONORRHOEAE DNA AMP PROB: CPT | Performed by: NURSE PRACTITIONER

## 2023-01-03 PROCEDURE — 99999 PR PBB SHADOW E&M-EST. PATIENT-LVL IV: CPT | Mod: PBBFAC,,, | Performed by: STUDENT IN AN ORGANIZED HEALTH CARE EDUCATION/TRAINING PROGRAM

## 2023-01-03 PROCEDURE — 3008F BODY MASS INDEX DOCD: CPT | Mod: CPTII,S$GLB,, | Performed by: NURSE PRACTITIONER

## 2023-01-03 PROCEDURE — 20610 DRAIN/INJ JOINT/BURSA W/O US: CPT | Mod: 50,S$GLB,, | Performed by: STUDENT IN AN ORGANIZED HEALTH CARE EDUCATION/TRAINING PROGRAM

## 2023-01-03 RX ORDER — TRIAMCINOLONE ACETONIDE 40 MG/ML
40 INJECTION, SUSPENSION INTRA-ARTICULAR; INTRAMUSCULAR
Status: DISCONTINUED | OUTPATIENT
Start: 2023-01-03 | End: 2023-01-03 | Stop reason: HOSPADM

## 2023-01-03 RX ADMIN — TRIAMCINOLONE ACETONIDE 40 MG: 40 INJECTION, SUSPENSION INTRA-ARTICULAR; INTRAMUSCULAR at 09:01

## 2023-01-03 NOTE — PROGRESS NOTES
"  Subjective:       Patient ID: Mary Russo is a 52 y.o. female.    Chief Complaint:  Well Woman      History of Present Illness  HPI  Presents today for WWE,std screening, pap smear    Health Maintenance   Topic Date Due    Mammogram  2022    TETANUS VACCINE  2026    Lipid Panel  2027    Hepatitis C Screening  Completed     GYN & OB History  Patient's last menstrual period was 2022.   Date of Last Pap: today     OB History    Para Term  AB Living   5 3 3   2 3   SAB IAB Ectopic Multiple Live Births                  # Outcome Date GA Lbr Paul/2nd Weight Sex Delivery Anes PTL Lv   5 AB            4 AB            3 Term            2 Term            1 Term                Review of Systems  Review of Systems        Objective:   Ht 5' 6" (1.676 m)   Wt 117.6 kg (259 lb 4.2 oz)   LMP 2022   BMI 41.85 kg/m²    Physical Exam:   Constitutional: She is oriented to person, place, and time. She appears well-developed and well-nourished.        Pulmonary/Chest: Right breast exhibits no inverted nipple, no mass, no nipple discharge, no skin change, no tenderness and no swelling. Left breast exhibits no inverted nipple, no mass, no nipple discharge, no skin change, no tenderness and no swelling.        Abdominal: Soft.     Genitourinary:    Inguinal canal, vagina, right adnexa and left adnexa normal.      Pelvic exam was performed with patient supine.   The external female genitalia was normal.   Genitalia hair distrobution normal .   Labial bartholins normal.Cervix is normal. No no adexnal prolapse. Right adnexum displays no mass, no tenderness and no fullness. Left adnexum displays no mass, no tenderness and no fullness. No erythema,  no vaginal discharge, bleeding, rectocele, cystocele or unspecified prolapse of vaginal walls in the vagina.    No foreign body in the vagina.      No signs of injury in the vagina.                 Neurological: She is alert and oriented to " person, place, and time.    Skin: Skin is warm and dry.    Psychiatric: She has a normal mood and affect. Her behavior is normal. Judgment and thought content normal.      Assessment:        1. Routine gynecological examination    2. Breast screening    3. Screen for STD (sexually transmitted disease)    4. Papanicolaou smear for cervical cancer screening               Plan:           Mary was seen today for well woman.    Diagnoses and all orders for this visit:    Routine gynecological examination    Breast screening  -     Mammo Digital Screening Bilat w/ Lemuel; Future    Screen for STD (sexually transmitted disease)  -     C. trachomatis/N. gonorrhoeae by AMP DNA [facility]; Vagina  -     HIV 1/2 Ag/Ab (4th Gen); Future  -     RPR; Future  -     Hepatitis Panel, Acute; Future    Papanicolaou smear for cervical cancer screening  -     Liquid-Based Pap Smear, Screening  -     HPV High Risk Genotypes, PCR

## 2023-01-03 NOTE — PATIENT INSTRUCTIONS
Assessment:  Mary Russo is a 52 y.o. female   Chief Complaint   Patient presents with    Left Knee - Pain    Right Knee - Pain       Encounter Diagnosis   Name Primary?    Primary osteoarthritis of both knees Yes        Plan:  Cortisone injection to bilateral knees  We discussed the proper protocols after the injection such as no submerging pools, baths tubs, or hot tubs for 24 hr.  Showering is okay today.  We also discussed that blood sugars can be elevated after an injection and asked patient to properly checked her sugars over the next few days and contact their PCP if there are any concerns.  We discussed red flags such as fevers, chills, red, warm, tender joint at the area of injection to please seek medical care immediately.    Continue with voltaren gel. Apply topical diclofenac (Voltaren) up to 4 times a day to the affected area.  It can be bought over the counter at any local pharmacy.    Please take Tylenol 1 Gram (2 extra-strength Tylenol tablets) up to 3 times a day.  Please to not take any extra doses of tylenol if you are scheduling in this manner.   Patient may ice every 2 hours for 15 minutes as needed to control pain and swelling.     General Arthritis info:    -shiny white stuff at end of a chicken bones is cartilage    -arthritis is wearing away of the cartilage that lines the end of your bones    -osteoarthritis is thought to be a wear and tear phenomenon    -symptoms are due to inflammation of joint causing stiffness, aching, and sometimes swelling    -occasionally sharp pain will occur causing a give way sensation    -Risk factors: genetic, weight, female > male, age    Treatment options:    -maintain healthy weight (every pound is 4 pounds of pressure on the knee)    -daily moderate exercise (walk, bike, swim 30 minutes per day) to keep joints moving    -daily strengthening exercises (through therapy or on own) to keep muscles supporting joint healthy and strong    -glucosamine  1500mg daily (look for USP label on bottle)    -tylenol as needed for pain (follow directions on the bottle)    -anti-inflammatory medication such as alleve may be helpful- take 1-2 tabs twice daily for 7 days. If it helps your pain, continue. If you do not feel any change, you may stop and then take it as needed.    (you may be given a once daily anti-inflammatory such as MOBIC. If given, avoid other anti-inflammatory medications such as advil, ibuprofen, motrin, naprosyn, alleve, etc)    -if swollen and painful, ice, decrease activity, and take anti-inflammatory daily for 5-7 days and if no relief call your doctor for further options    -consider cortisone injection (every 3-4 months at most)- anti- inflammatory steroid medication that can be injected directly into the joint to reduce inflammation    -consider hyaluronic acid injections (eufflexxa, hyalgan, synvisc, supartz) (every 6 months at most)- protein injection that helps decrease pain and irritability in the joint. It is best used to help prolong intervals between cortisone injections to minimize steroid injections. These are currently approved for knee injections. Discuss with your doctor if other joint involved. Call to seek approval prior to the injections.    -long-term treatment may include a total joint replacement (keep diary of good days and bad days, then evaluate as to when you are ready)    Follow-up: 6 weeks or sooner if there are any problems between now and then.    Thank you for choosing [facility] and Dr. Skip Rich for your orthopedic & sports medicine care. It is our goal to provide you with exceptional care that will help keep you healthy, active, and get you back in the game.    Please do not hesitate to reach out to us via email, phone, or MyChart with any questions, concerns, or feedback.    If you felt that you received exemplary care today, please consider leaving us feedback on [employer]  at:  [email or web address]    If you are experiencing pain/discomfort ,or have questions after 5pm and would like to be connected to the [facility] [place] on-call team, please call this number and specify which Sports Medicine provider is treating you: [phone]

## 2023-01-03 NOTE — PROCEDURES
Large Joint Aspiration/Injection: bilateral knee    Date/Time: 1/3/2023 9:00 AM  Performed by: Skip Rich MD  Authorized by: Skip Rich MD     Consent Done?:  Yes (Verbal)  Indications:  Arthritis and pain  Site marked: the procedure site was marked    Timeout: prior to procedure the correct patient, procedure, and site was verified    Prep: patient was prepped and draped in usual sterile fashion    Local anesthetic:  Bupivacaine 0.5% without epinephrine and lidocaine 1% without epinephrine    Details:  Needle Size:  22 G  Approach:  Anterolateral  Location:  Knee  Laterality:  Bilateral  Site:  Bilateral knee  Medications (Right):  40 mg triamcinolone acetonide 40 mg/mL  Medications (Left):  40 mg triamcinolone acetonide 40 mg/mL  Patient tolerance:  Patient tolerated the procedure well with no immediate complications

## 2023-01-03 NOTE — PROGRESS NOTES
"        Patient ID: Mary Russo  YOB: 1970  MRN: [id number]    Chief Complaint: Pain of the Left Knee and Pain of the Right Knee      Referred By: Deloris Aiken MD for Bilateral Knee Pain    History of Present Illness: Mary Russo is a right-hand dominant 52 y.o. female who presents today with bilateral knee pain.  Patient reports that she had had bilateral knee pain for the last several years, with the right being greater than the left.  She states that she really started to notice the pain increased after having Covid for the second time.  She states that after the second bout of Covid she had extremely bad leg pain to the point where she couldn't touch her knees.  She was concerned that she might have "reactive arthritis".  Patient states that her knee pain is currently a 5/10 and that it improves with the use of heat and Voltaren gel.  She has tried Physical Therapy at [facility] and continues to perform her HEP and is going to the gym 1-2 times per week for non impact cardio activities.  She reports that her pain is a constant achy, throbbing pain that is mainly on the anterior, medial aspect of the knee with occasional sharp, stabbing pain that will wake her up at night.      The patient is active in none.  Occupation: Human Resources      Past Medical History:   Past Medical History:   Diagnosis Date    Acid reflux     Anxiety     Arthritis     Carpal tunnel syndrome of right wrist     Cholelithiases     Hypertension     Iron deficiency     Morbid obesity      Past Surgical History:   Procedure Laterality Date    CHOLECYSTECTOMY      ESOPHAGOGASTRODUODENOSCOPY N/A 10/14/2019    Procedure: ESOPHAGOGASTRODUODENOSCOPY (EGD);  Surgeon: Stefanie Monique MD;  Location: [facility];  Service: Endoscopy;  Laterality: N/A;    ESOPHAGOGASTRODUODENOSCOPY N/A 12/23/2019    Procedure: EGD (ESOPHAGOGASTRODUODENOSCOPY);  Surgeon: Stefanie Monique MD;  Location: [facility];  " Service: Endoscopy;  Laterality: N/A;    EYE SURGERY  2/2018    Lasik    HERNIA REPAIR      LAPAROSCOPIC GASTRIC BANDING  10/31/2017    removed    REFRACTIVE SURGERY      TUBAL LIGATION       Family History   Problem Relation Age of Onset    Cancer Mother 49        breast cancer    Heart disease Mother     Diabetes Mother     Stroke Mother     Obesity Mother     Breast cancer Mother     Hypertension Mother     Kidney disease Mother     Heart disease Father     Obesity Father     Obesity Sister     Obesity Maternal Grandmother     Obesity Sister     Colon cancer Neg Hx     Ovarian cancer Neg Hx      Social History     Socioeconomic History    Marital status:     Number of children: 3   Occupational History     Employer: [employer]   Tobacco Use    Smoking status: Never    Smokeless tobacco: Never   Substance and Sexual Activity    Alcohol use: Not Currently     Comment: occasionally    Drug use: Never    Sexual activity: Yes     Partners: Male     Birth control/protection: Other-see comments     Comment: tubal     Social Determinants of Health     Financial Resource Strain: Low Risk     Difficulty of Paying Living Expenses: Not hard at all   Food Insecurity: No Food Insecurity    Worried About Running Out of Food in the Last Year: Never true    Ran Out of Food in the Last Year: Never true   Transportation Needs: No Transportation Needs    Lack of Transportation (Medical): No    Lack of Transportation (Non-Medical): No   Physical Activity: Insufficiently Active    Days of Exercise per Week: 3 days    Minutes of Exercise per Session: 40 min   Stress: No Stress Concern Present    Feeling of Stress : Not at all   Social Connections: Unknown    Frequency of Communication with Friends and Family: More than three times a week    Frequency of Social Gatherings with Friends and Family: Twice a week    Active Member of Clubs or Organizations: Yes    Attends Club or Organization  Meetings: More than 4 times per year    Marital Status:    Housing Stability: Low Risk     Unable to Pay for Housing in the Last Year: No    Number of Places Lived in the Last Year: 1    Unstable Housing in the Last Year: No     Medication List with Changes/Refills   Current Medications    ACETAMINOPHEN (TYLENOL) 500 MG TABLET    Take 500 mg by mouth every 6 (six) hours as needed.    AMLODIPINE (NORVASC) 5 MG TABLET    TAKE 1 TABLET BY MOUTH EVERY DAY    DICLOFENAC SODIUM (VOLTAREN) 1 % GEL    Apply 2 g topically 4 (four) times daily.    IPRATROPIUM (ATROVENT) 21 MCG (0.03 %) NASAL SPRAY    2 sprays by Nasal route 3 (three) times daily as needed for Rhinitis.    MECLIZINE (ANTIVERT) 12.5 MG TABLET    Take 1 tablet (12.5 mg total) by mouth 3 (three) times daily as needed for Dizziness.    METOPROLOL SUCCINATE (TOPROL-XL) 50 MG 24 HR TABLET    Take 50 mg by mouth once daily at 6am.    PANTOPRAZOLE (PROTONIX) 40 MG TABLET    TAKE 1 TABLET BY MOUTH EVERY DAY    SERTRALINE (ZOLOFT) 25 MG TABLET    Take 1 tablet (25 mg total) by mouth once daily.    TEMAZEPAM (RESTORIL) 15 MG CAP    TAKE 1 CAPSULE BY MOUTH NIGHTLY AS NEEDED FOR SLEEP.     Review of patient's allergies indicates:   Allergen Reactions    Adhesive Swelling     Swelling x2 with flu shot and band-aid.  Swells in shape of band-aid.    Lisinopril Other (See Comments)     Cough    Latex, natural rubber Swelling     Localized swelling at site of bandaid/adhesive       Physical Exam:   Body mass index is 42.13 kg/m².    GENERAL: Well appearing, in no acute distress.  HEAD: Normocephalic and atraumatic.  ENT: External ears and nose grossly normal.  EYES: EOMI bilaterally  PULMONARY: Respirations are grossly even and non-labored.  NEURO: Awake, alert, and oriented x 3.  SKIN: No obvious rashes appreciated.  PSYCH: Mood & affect are appropriate.    Detailed MSK exam:     Left knee exam:   -ROM: extension 0, flexion 120  -TTP: Medial joint line and Lateral  joint line  -effusion: trace  -Patellar apprehension negative  -Hunter test negative  -stable to varus and valgus stress tests  -Lachman test negative, anterior drawer test negative, posterior drawer test negative    Right knee exam:   -ROM: extension 0, flexion 120  -TTP: Medial joint line and Lateral joint line  -effusion: trace  -Patellar apprehension negative  -Hunter test negative  -stable to varus and valgus stress tests  -Lachman test negative, anterior drawer test negative, posterior drawer test negative      Imaging:    Relevant imaging results were reviewed and interpreted by me and per my read shows significant medial compartment joint space narrowing with osteophytes bilaterally.  This was discussed with the patient and / or family today.     Assessment:  Mary Russo is a 52 y.o. female presenting with bilateral knee pain.   History, physical and radiographs are consistent with a likely diagnosis of moderate to severe bilateral OA.   Plan: Steroid injection given today (see separate procedure note for details). Continue home exercises. Continue conservative management for pain.   Follow up 6 weeks. All questions answered.      Primary osteoarthritis of both knees  -     Large Joint Aspiration/Injection: bilateral knee         A copy of today's visit note has been sent to the referring provider.     Electronically signed:  Skip Rich MD, MPH  01/03/2023  9:29 AM

## 2023-01-04 LAB
C TRACH DNA SPEC QL NAA+PROBE: NOT DETECTED
N GONORRHOEA DNA SPEC QL NAA+PROBE: NOT DETECTED

## 2023-02-14 ENCOUNTER — OFFICE VISIT (OUTPATIENT)
Dept: ORTHOPEDICS | Facility: CLINIC | Age: 53
End: 2023-02-14
Payer: COMMERCIAL

## 2023-02-14 DIAGNOSIS — M17.0 PRIMARY OSTEOARTHRITIS OF BOTH KNEES: Primary | ICD-10-CM

## 2023-02-14 PROCEDURE — 1159F PR MEDICATION LIST DOCUMENTED IN MEDICAL RECORD: ICD-10-PCS | Mod: CPTII,S$GLB,, | Performed by: STUDENT IN AN ORGANIZED HEALTH CARE EDUCATION/TRAINING PROGRAM

## 2023-02-14 PROCEDURE — 99999 PR PBB SHADOW E&M-EST. PATIENT-LVL III: CPT | Mod: PBBFAC,,, | Performed by: STUDENT IN AN ORGANIZED HEALTH CARE EDUCATION/TRAINING PROGRAM

## 2023-02-14 PROCEDURE — 99214 PR OFFICE/OUTPT VISIT, EST, LEVL IV, 30-39 MIN: ICD-10-PCS | Mod: S$GLB,,, | Performed by: STUDENT IN AN ORGANIZED HEALTH CARE EDUCATION/TRAINING PROGRAM

## 2023-02-14 PROCEDURE — 99214 OFFICE O/P EST MOD 30 MIN: CPT | Mod: S$GLB,,, | Performed by: STUDENT IN AN ORGANIZED HEALTH CARE EDUCATION/TRAINING PROGRAM

## 2023-02-14 PROCEDURE — 1160F PR REVIEW ALL MEDS BY PRESCRIBER/CLIN PHARMACIST DOCUMENTED: ICD-10-PCS | Mod: CPTII,S$GLB,, | Performed by: STUDENT IN AN ORGANIZED HEALTH CARE EDUCATION/TRAINING PROGRAM

## 2023-02-14 PROCEDURE — 1160F RVW MEDS BY RX/DR IN RCRD: CPT | Mod: CPTII,S$GLB,, | Performed by: STUDENT IN AN ORGANIZED HEALTH CARE EDUCATION/TRAINING PROGRAM

## 2023-02-14 PROCEDURE — 99999 PR PBB SHADOW E&M-EST. PATIENT-LVL III: ICD-10-PCS | Mod: PBBFAC,,, | Performed by: STUDENT IN AN ORGANIZED HEALTH CARE EDUCATION/TRAINING PROGRAM

## 2023-02-14 PROCEDURE — 1159F MED LIST DOCD IN RCRD: CPT | Mod: CPTII,S$GLB,, | Performed by: STUDENT IN AN ORGANIZED HEALTH CARE EDUCATION/TRAINING PROGRAM

## 2023-02-14 NOTE — PROGRESS NOTES
Patient ID: Mary Russo  YOB: 1970  MRN: [id number]    Chief Complaint: No chief complaint on file.      History of Present Illness: Mary Russo is a right-hand dominant 52 y.o. female who presents for follow up s/p  bilateral knee CSI on 1/03/23. She states that Cortizone injection helped with knee pain, she is now taking less tylenol and applying Voltaren gel less often. Her pain is 3/10 today.      Occupation: Human Resources      Past Medical History:   Past Medical History:   Diagnosis Date    Acid reflux     Anxiety     Arthritis     Carpal tunnel syndrome of right wrist     Cholelithiases     Hypertension     Iron deficiency     Morbid obesity      Past Surgical History:   Procedure Laterality Date    CHOLECYSTECTOMY      ESOPHAGOGASTRODUODENOSCOPY N/A 10/14/2019    Procedure: ESOPHAGOGASTRODUODENOSCOPY (EGD);  Surgeon: Stefanie Monique MD;  Location: [facility];  Service: Endoscopy;  Laterality: N/A;    ESOPHAGOGASTRODUODENOSCOPY N/A 12/23/2019    Procedure: EGD (ESOPHAGOGASTRODUODENOSCOPY);  Surgeon: Stefanie Monique MD;  Location: [facility];  Service: Endoscopy;  Laterality: N/A;    EYE SURGERY  2/2018    Lasik    HERNIA REPAIR      LAPAROSCOPIC GASTRIC BANDING  10/31/2017    removed    REFRACTIVE SURGERY      TUBAL LIGATION       Family History   Problem Relation Age of Onset    Cancer Mother 49        breast cancer    Heart disease Mother     Diabetes Mother     Stroke Mother     Obesity Mother     Breast cancer Mother     Hypertension Mother     Kidney disease Mother     Heart disease Father     Obesity Father     Obesity Sister     Obesity Maternal Grandmother     Obesity Sister     Colon cancer Neg Hx     Ovarian cancer Neg Hx      Social History     Socioeconomic History    Marital status:     Number of children: 3   Occupational History     Employer: [employer]   Tobacco Use    Smoking status: Never    Smokeless  tobacco: Never   Substance and Sexual Activity    Alcohol use: Not Currently     Comment: occasionally    Drug use: Never    Sexual activity: Yes     Partners: Male     Birth control/protection: Other-see comments     Comment: tubal     Social Determinants of Health     Financial Resource Strain: Low Risk     Difficulty of Paying Living Expenses: Not hard at all   Food Insecurity: No Food Insecurity    Worried About Running Out of Food in the Last Year: Never true    Ran Out of Food in the Last Year: Never true   Transportation Needs: No Transportation Needs    Lack of Transportation (Medical): No    Lack of Transportation (Non-Medical): No   Physical Activity: Insufficiently Active    Days of Exercise per Week: 3 days    Minutes of Exercise per Session: 40 min   Stress: No Stress Concern Present    Feeling of Stress : Not at all   Social Connections: Unknown    Frequency of Communication with Friends and Family: More than three times a week    Frequency of Social Gatherings with Friends and Family: Twice a week    Active Member of Clubs or Organizations: Yes    Attends Club or Organization Meetings: More than 4 times per year    Marital Status:    Housing Stability: Low Risk     Unable to Pay for Housing in the Last Year: No    Number of Places Lived in the Last Year: 1    Unstable Housing in the Last Year: No     Medication List with Changes/Refills   Current Medications    ACETAMINOPHEN (TYLENOL) 500 MG TABLET    Take 500 mg by mouth every 6 (six) hours as needed.    AMLODIPINE (NORVASC) 5 MG TABLET    TAKE 1 TABLET BY MOUTH EVERY DAY    DICLOFENAC SODIUM (VOLTAREN) 1 % GEL    Apply 2 g topically 4 (four) times daily.    IPRATROPIUM (ATROVENT) 21 MCG (0.03 %) NASAL SPRAY    2 sprays by Each Nostril route 3 (three) times daily as needed for Rhinitis.    MECLIZINE (ANTIVERT) 12.5 MG TABLET    Take 1 tablet (12.5 mg total) by mouth 3 (three) times daily as needed for Dizziness.    METOPROLOL SUCCINATE  (TOPROL-XL) 50 MG 24 HR TABLET    Take 50 mg by mouth once daily at 6am.    PANTOPRAZOLE (PROTONIX) 40 MG TABLET    TAKE 1 TABLET BY MOUTH EVERY DAY    SERTRALINE (ZOLOFT) 25 MG TABLET    Take 1 tablet (25 mg total) by mouth once daily.    TEMAZEPAM (RESTORIL) 15 MG CAP    TAKE 1 CAPSULE BY MOUTH NIGHTLY AS NEEDED FOR SLEEP.     Review of patient's allergies indicates:   Allergen Reactions    Adhesive Swelling     Swelling x2 with flu shot and band-aid.  Swells in shape of band-aid.    Lisinopril Other (See Comments)     Cough    Latex, natural rubber Swelling     Localized swelling at site of bandaid/adhesive       Physical Exam:   There is no height or weight on file to calculate BMI.    GENERAL: Well appearing, in no acute distress.  HEAD: Normocephalic and atraumatic.  ENT: External ears and nose grossly normal.  EYES: EOMI bilaterally  PULMONARY: Respirations are grossly even and non-labored.  NEURO: Awake, alert, and oriented x 3.  SKIN: No obvious rashes appreciated.  PSYCH: Mood & affect are appropriate.    Detailed MSK exam:     Left knee exam:   -ROM: extension 0, flexion 120  -TTP: Medial joint line and Lateral joint line  -effusion: trace  -Patellar apprehension negative  -Hunter test negative  -stable to varus and valgus stress tests  -Lachman test negative, anterior drawer test negative, posterior drawer test negative     Right knee exam:   -ROM: extension 0, flexion 120  -TTP: Medial joint line and Lateral joint line  -effusion: trace  -Patellar apprehension negative  -Hunter test negative  -stable to varus and valgus stress tests  -Lachman test negative, anterior drawer test negative, posterior drawer test negative    Imaging:    Relevant imaging results were reviewed and interpreted by me and per my read shows moderate arthritic changes with medial compartment joint space narrowing and osteophytes.  This was discussed with the patient and / or family today.     Assessment:  Mary Russo  is a 52 y.o. female following up for bilateral knee pain. Doing 50% better with steroid injections and is able to do more in the gym and being on her feet more.   Plan: prior auth for gel injections. Continue conservative management for pain.   Follow up 4 weeks. All questions answered.     Primary osteoarthritis of both knees  -     Prior authorization Order       MEDICAL NECESSITY FOR VISCOSUPPLEMENTATION: After thorough evaluation of the patient, I have determined that visco-supplementation is medically necessary. The patient has painful degenerative changes of the knee with failure of conservative treatments including lifestyle modifications and rehabilitation exercises.  Oral analgesis/NSAIDs have not adequately controlled symptoms and there is radiographic evidence of Kellgren Jatin grade 2 or greater osteoarthritic changes, or in lack of radiographic evidence, there is arthroscopic or other evidence of chondrosis.     Electronically signed:  Skip Rich MD, MPH  02/14/2023  8:56 AM

## 2023-02-14 NOTE — PATIENT INSTRUCTIONS
Assessment:  Mary Russo is a 52 y.o. female No chief complaint on file.      Encounter Diagnosis   Name Primary?    Primary osteoarthritis of both knees Yes        Plan:  Pre - authorization placed for visco supplementation bilateral knees Euflexxa  Follow up in 4 weeks    Follow-up: 4 weeks or sooner if there are any problems between now and then.    Thank you for choosing [facility] and Dr. Skip Rich for your orthopedic & sports medicine care. It is our goal to provide you with exceptional care that will help keep you healthy, active, and get you back in the game.    Please do not hesitate to reach out to us via email, phone, or MyChart with any questions, concerns, or feedback.    If you felt that you received exemplary care today, please consider leaving us feedback on [employer] at:  [email or web address]    If you are experiencing pain/discomfort ,or have questions after 5pm and would like to be connected to the [facility] on-call team, please call this number and specify which Sports Medicine provider is treating you: [phone]

## 2023-03-14 ENCOUNTER — OFFICE VISIT (OUTPATIENT)
Dept: ORTHOPEDICS | Facility: CLINIC | Age: 53
End: 2023-03-14
Payer: COMMERCIAL

## 2023-03-14 VITALS — WEIGHT: 255.31 LBS | HEIGHT: 66 IN | BODY MASS INDEX: 41.03 KG/M2

## 2023-03-14 DIAGNOSIS — M17.0 PRIMARY OSTEOARTHRITIS OF BOTH KNEES: Primary | ICD-10-CM

## 2023-03-14 PROCEDURE — 99499 NO LOS: ICD-10-PCS | Mod: S$GLB,,, | Performed by: STUDENT IN AN ORGANIZED HEALTH CARE EDUCATION/TRAINING PROGRAM

## 2023-03-14 PROCEDURE — 1159F MED LIST DOCD IN RCRD: CPT | Mod: CPTII,S$GLB,, | Performed by: STUDENT IN AN ORGANIZED HEALTH CARE EDUCATION/TRAINING PROGRAM

## 2023-03-14 PROCEDURE — 3008F BODY MASS INDEX DOCD: CPT | Mod: CPTII,S$GLB,, | Performed by: STUDENT IN AN ORGANIZED HEALTH CARE EDUCATION/TRAINING PROGRAM

## 2023-03-14 PROCEDURE — 1160F RVW MEDS BY RX/DR IN RCRD: CPT | Mod: CPTII,S$GLB,, | Performed by: STUDENT IN AN ORGANIZED HEALTH CARE EDUCATION/TRAINING PROGRAM

## 2023-03-14 PROCEDURE — 20610 LARGE JOINT ASPIRATION/INJECTION: BILATERAL KNEE: ICD-10-PCS | Mod: 50,S$GLB,, | Performed by: STUDENT IN AN ORGANIZED HEALTH CARE EDUCATION/TRAINING PROGRAM

## 2023-03-14 PROCEDURE — 99999 PR PBB SHADOW E&M-EST. PATIENT-LVL III: ICD-10-PCS | Mod: PBBFAC,,, | Performed by: STUDENT IN AN ORGANIZED HEALTH CARE EDUCATION/TRAINING PROGRAM

## 2023-03-14 PROCEDURE — 99999 PR PBB SHADOW E&M-EST. PATIENT-LVL III: CPT | Mod: PBBFAC,,, | Performed by: STUDENT IN AN ORGANIZED HEALTH CARE EDUCATION/TRAINING PROGRAM

## 2023-03-14 PROCEDURE — 99499 UNLISTED E&M SERVICE: CPT | Mod: S$GLB,,, | Performed by: STUDENT IN AN ORGANIZED HEALTH CARE EDUCATION/TRAINING PROGRAM

## 2023-03-14 PROCEDURE — 20610 DRAIN/INJ JOINT/BURSA W/O US: CPT | Mod: 50,S$GLB,, | Performed by: STUDENT IN AN ORGANIZED HEALTH CARE EDUCATION/TRAINING PROGRAM

## 2023-03-14 PROCEDURE — 1160F PR REVIEW ALL MEDS BY PRESCRIBER/CLIN PHARMACIST DOCUMENTED: ICD-10-PCS | Mod: CPTII,S$GLB,, | Performed by: STUDENT IN AN ORGANIZED HEALTH CARE EDUCATION/TRAINING PROGRAM

## 2023-03-14 PROCEDURE — 1159F PR MEDICATION LIST DOCUMENTED IN MEDICAL RECORD: ICD-10-PCS | Mod: CPTII,S$GLB,, | Performed by: STUDENT IN AN ORGANIZED HEALTH CARE EDUCATION/TRAINING PROGRAM

## 2023-03-14 PROCEDURE — 3008F PR BODY MASS INDEX (BMI) DOCUMENTED: ICD-10-PCS | Mod: CPTII,S$GLB,, | Performed by: STUDENT IN AN ORGANIZED HEALTH CARE EDUCATION/TRAINING PROGRAM

## 2023-03-14 NOTE — PROCEDURES
Large Joint Aspiration/Injection: bilateral knee    Date/Time: 3/14/2023 8:40 AM  Performed by: Skip Rich MD  Authorized by: Skip Rich MD     Consent Done?:  Yes (Verbal)  Indications:  Arthritis and pain  Site marked: the procedure site was marked    Timeout: prior to procedure the correct patient, procedure, and site was verified    Prep: patient was prepped and draped in usual sterile fashion      Details:  Needle Size:  22 G  Approach:  Anterolateral  Location:  Knee  Laterality:  Bilateral  Site:  Bilateral knee  Medications (Right):  20 mg sodium hyaluronate (EUFLEXXA) 10 mg/mL(mw 2.4 -3.6 million)  Medications (Left):  20 mg sodium hyaluronate (EUFLEXXA) 10 mg/mL(mw 2.4 -3.6 million)  Patient tolerance:  Patient tolerated the procedure well with no immediate complications

## 2023-03-21 ENCOUNTER — OFFICE VISIT (OUTPATIENT)
Dept: ORTHOPEDICS | Facility: CLINIC | Age: 53
End: 2023-03-21
Payer: COMMERCIAL

## 2023-03-21 VITALS — BODY MASS INDEX: 41.03 KG/M2 | HEIGHT: 66 IN | WEIGHT: 255.31 LBS

## 2023-03-21 DIAGNOSIS — M17.0 PRIMARY OSTEOARTHRITIS OF BOTH KNEES: Primary | ICD-10-CM

## 2023-03-21 PROCEDURE — 99999 PR PBB SHADOW E&M-EST. PATIENT-LVL III: CPT | Mod: PBBFAC,,, | Performed by: STUDENT IN AN ORGANIZED HEALTH CARE EDUCATION/TRAINING PROGRAM

## 2023-03-21 PROCEDURE — 20610 DRAIN/INJ JOINT/BURSA W/O US: CPT | Mod: 50,S$GLB,, | Performed by: STUDENT IN AN ORGANIZED HEALTH CARE EDUCATION/TRAINING PROGRAM

## 2023-03-21 PROCEDURE — 1159F MED LIST DOCD IN RCRD: CPT | Mod: CPTII,S$GLB,, | Performed by: STUDENT IN AN ORGANIZED HEALTH CARE EDUCATION/TRAINING PROGRAM

## 2023-03-21 PROCEDURE — 99999 PR PBB SHADOW E&M-EST. PATIENT-LVL III: ICD-10-PCS | Mod: PBBFAC,,, | Performed by: STUDENT IN AN ORGANIZED HEALTH CARE EDUCATION/TRAINING PROGRAM

## 2023-03-21 PROCEDURE — 3008F BODY MASS INDEX DOCD: CPT | Mod: CPTII,S$GLB,, | Performed by: STUDENT IN AN ORGANIZED HEALTH CARE EDUCATION/TRAINING PROGRAM

## 2023-03-21 PROCEDURE — 1160F RVW MEDS BY RX/DR IN RCRD: CPT | Mod: CPTII,S$GLB,, | Performed by: STUDENT IN AN ORGANIZED HEALTH CARE EDUCATION/TRAINING PROGRAM

## 2023-03-21 PROCEDURE — 3008F PR BODY MASS INDEX (BMI) DOCUMENTED: ICD-10-PCS | Mod: CPTII,S$GLB,, | Performed by: STUDENT IN AN ORGANIZED HEALTH CARE EDUCATION/TRAINING PROGRAM

## 2023-03-21 PROCEDURE — 99499 NO LOS: ICD-10-PCS | Mod: S$GLB,,, | Performed by: STUDENT IN AN ORGANIZED HEALTH CARE EDUCATION/TRAINING PROGRAM

## 2023-03-21 PROCEDURE — 20610 LARGE JOINT ASPIRATION/INJECTION: BILATERAL KNEE: ICD-10-PCS | Mod: 50,S$GLB,, | Performed by: STUDENT IN AN ORGANIZED HEALTH CARE EDUCATION/TRAINING PROGRAM

## 2023-03-21 PROCEDURE — 1159F PR MEDICATION LIST DOCUMENTED IN MEDICAL RECORD: ICD-10-PCS | Mod: CPTII,S$GLB,, | Performed by: STUDENT IN AN ORGANIZED HEALTH CARE EDUCATION/TRAINING PROGRAM

## 2023-03-21 PROCEDURE — 99499 UNLISTED E&M SERVICE: CPT | Mod: S$GLB,,, | Performed by: STUDENT IN AN ORGANIZED HEALTH CARE EDUCATION/TRAINING PROGRAM

## 2023-03-21 PROCEDURE — 1160F PR REVIEW ALL MEDS BY PRESCRIBER/CLIN PHARMACIST DOCUMENTED: ICD-10-PCS | Mod: CPTII,S$GLB,, | Performed by: STUDENT IN AN ORGANIZED HEALTH CARE EDUCATION/TRAINING PROGRAM

## 2023-03-21 NOTE — PROCEDURES
Large Joint Aspiration/Injection: bilateral knee    Date/Time: 3/21/2023 8:40 AM  Performed by: Skip Rich MD  Authorized by: Skip Rich MD     Consent Done?:  Yes (Verbal)  Indications:  Arthritis and pain  Site marked: the procedure site was marked    Timeout: prior to procedure the correct patient, procedure, and site was verified    Prep: patient was prepped and draped in usual sterile fashion      Details:  Needle Size:  22 G  Approach:  Anterolateral  Location:  Knee  Laterality:  Bilateral  Site:  Bilateral knee  Medications (Right):  20 mg sodium hyaluronate (EUFLEXXA) 10 mg/mL(mw 2.4 -3.6 million)  Medications (Left):  20 mg sodium hyaluronate (EUFLEXXA) 10 mg/mL(mw 2.4 -3.6 million)  Patient tolerance:  Patient tolerated the procedure well with no immediate complications

## 2023-04-03 ENCOUNTER — OFFICE VISIT (OUTPATIENT)
Dept: ORTHOPEDICS | Facility: CLINIC | Age: 53
End: 2023-04-03
Payer: COMMERCIAL

## 2023-04-03 ENCOUNTER — HOSPITAL ENCOUNTER (OUTPATIENT)
Dept: RADIOLOGY | Facility: HOSPITAL | Age: 53
Discharge: HOME | End: 2023-04-03
Attending: PHYSICIAN ASSISTANT
Payer: COMMERCIAL

## 2023-04-03 VITALS — HEIGHT: 66 IN | WEIGHT: 255 LBS | BODY MASS INDEX: 40.98 KG/M2

## 2023-04-03 DIAGNOSIS — M17.0 PRIMARY OSTEOARTHRITIS OF BOTH KNEES: ICD-10-CM

## 2023-04-03 DIAGNOSIS — M17.0 PRIMARY OSTEOARTHRITIS OF BOTH KNEES: Primary | ICD-10-CM

## 2023-04-03 PROCEDURE — 99203 PR OFFICE/OUTPT VISIT, NEW, LEVL III, 30-44 MIN: ICD-10-PCS | Mod: 25,S$GLB,, | Performed by: PHYSICIAN ASSISTANT

## 2023-04-03 PROCEDURE — 3008F PR BODY MASS INDEX (BMI) DOCUMENTED: ICD-10-PCS | Mod: CPTII,S$GLB,, | Performed by: PHYSICIAN ASSISTANT

## 2023-04-03 PROCEDURE — 1159F MED LIST DOCD IN RCRD: CPT | Mod: CPTII,S$GLB,, | Performed by: PHYSICIAN ASSISTANT

## 2023-04-03 PROCEDURE — 73562 X-RAY EXAM OF KNEE 3: CPT | Mod: TC,50

## 2023-04-03 PROCEDURE — 73562 X-RAY EXAM OF KNEE 3: CPT | Mod: 26,,, | Performed by: RADIOLOGY

## 2023-04-03 PROCEDURE — 99999 PR PBB SHADOW E&M-EST. PATIENT-LVL IV: ICD-10-PCS | Mod: PBBFAC,,, | Performed by: PHYSICIAN ASSISTANT

## 2023-04-03 PROCEDURE — 20610 DRAIN/INJ JOINT/BURSA W/O US: CPT | Mod: 50,S$GLB,, | Performed by: PHYSICIAN ASSISTANT

## 2023-04-03 PROCEDURE — 1160F PR REVIEW ALL MEDS BY PRESCRIBER/CLIN PHARMACIST DOCUMENTED: ICD-10-PCS | Mod: CPTII,S$GLB,, | Performed by: PHYSICIAN ASSISTANT

## 2023-04-03 PROCEDURE — 20610 LARGE JOINT ASPIRATION/INJECTION: BILATERAL KNEE: ICD-10-PCS | Mod: 50,S$GLB,, | Performed by: PHYSICIAN ASSISTANT

## 2023-04-03 PROCEDURE — 1160F RVW MEDS BY RX/DR IN RCRD: CPT | Mod: CPTII,S$GLB,, | Performed by: PHYSICIAN ASSISTANT

## 2023-04-03 PROCEDURE — 99999 PR PBB SHADOW E&M-EST. PATIENT-LVL IV: CPT | Mod: PBBFAC,,, | Performed by: PHYSICIAN ASSISTANT

## 2023-04-03 PROCEDURE — 3008F BODY MASS INDEX DOCD: CPT | Mod: CPTII,S$GLB,, | Performed by: PHYSICIAN ASSISTANT

## 2023-04-03 PROCEDURE — 1159F PR MEDICATION LIST DOCUMENTED IN MEDICAL RECORD: ICD-10-PCS | Mod: CPTII,S$GLB,, | Performed by: PHYSICIAN ASSISTANT

## 2023-04-03 PROCEDURE — 99203 OFFICE O/P NEW LOW 30 MIN: CPT | Mod: 25,S$GLB,, | Performed by: PHYSICIAN ASSISTANT

## 2023-04-03 PROCEDURE — 73562 XR KNEE ORTHO BILAT: ICD-10-PCS | Mod: 26,,, | Performed by: RADIOLOGY

## 2023-04-03 RX ORDER — CELECOXIB 200 MG/1
200 CAPSULE ORAL DAILY
Qty: 30 CAPSULE | Refills: 0 | Status: SHIPPED | OUTPATIENT
Start: 2023-04-03 | End: 2023-05-04

## 2023-04-03 NOTE — PROCEDURES
Large Joint Aspiration/Injection: bilateral knee    Date/Time: 4/3/2023 8:30 AM  Performed by: MARKOS Avelar  Authorized by: MARKOS Avelar     Consent Done?:  Yes (Verbal)  Indications:  Arthritis, joint swelling and pain  Site marked: the procedure site was marked      Local anesthesia used?: Yes    Local anesthetic:  Topical anesthetic    Details:  Needle Size:  22 G  Approach:  Anterolateral  Location:  Knee  Laterality:  Bilateral  Site:  Bilateral knee  Medications (Right):  20 mg sodium hyaluronate (EUFLEXXA) 10 mg/mL(mw 2.4 -3.6 million)  Medications (Left):  20 mg sodium hyaluronate (EUFLEXXA) 10 mg/mL(mw 2.4 -3.6 million)     Verbal consent was obtained  The patient's ID, site, side was verified  The site was sterile prepped in standard fashion  The injection was performed in the vernon-lateral side without complication  A sterile Band-Aid was applied    Patient was directed to apply ice today at roughly 15 minutes at a time as needed.  It was discussed that they may be sore for the next few days or so.  Please avoid strenuous activity over the next 24 hours.  It was also discussed that the patient may have a increase in glucose if diabetic and should monitor levels.  Patient was instructed to call as needed.

## 2023-04-03 NOTE — PROGRESS NOTES
Patient ID: Mary Russo is a 52 y.o. female.    Chief Complaint: Pain of the Left Knee and Pain of the Right Knee      HPI: Mary Russo  is a 52 y.o. female who c/o Pain of the Left Knee and Pain of the Right Knee       Patient presents to me today as a new patient with chief complaint of bilateral knee pain.  Pain is at worst a 5/10 affecting activity of daily living and thus her quality of life.  Patient has had a chronic ongoing issue for several years but has noticed an increase in the pain and stiffness since the beginning of this year.  She tries to remain active in her daily routine going to the gym multiple times a week.  She is not using any devices to assist with ambulation nor does she have any braces.  She states she is using Tylenol arthritis as well as Voltaren gel that does provide some relief but at times feel she needs a little more.  She received a steroid injection January of 2023  Additionally she has received 2 Euflexxa injections by Dr. Funes and does find that these may be providing additional relief  Patient is presently denying any shortness of breath, chest pain, fever/chills, nausea/vomiting, loss of taste or smell, numbness/tingling or sensation changes, loss of bladder or bowel function.    Past Medical History:   Diagnosis Date    Acid reflux     Anxiety     Arthritis     Carpal tunnel syndrome of right wrist     Cholelithiases     Hypertension     Iron deficiency     Morbid obesity        Past Surgical History:   Procedure Laterality Date    CHOLECYSTECTOMY      ESOPHAGOGASTRODUODENOSCOPY N/A 10/14/2019    Procedure: ESOPHAGOGASTRODUODENOSCOPY (EGD);  Surgeon: Stefanie Monique MD;  Location: [facility];  Service: Endoscopy;  Laterality: N/A;    ESOPHAGOGASTRODUODENOSCOPY N/A 12/23/2019    Procedure: EGD (ESOPHAGOGASTRODUODENOSCOPY);  Surgeon: Stefanie Monique MD;  Location: [facility];  Service: Endoscopy;  Laterality: N/A;    EYE SURGERY  2/2018    Lasik     HERNIA REPAIR      LAPAROSCOPIC GASTRIC BANDING  10/31/2017    removed    REFRACTIVE SURGERY      TUBAL LIGATION         Family History   Problem Relation Age of Onset    Cancer Mother 49        breast cancer    Heart disease Mother     Diabetes Mother     Stroke Mother     Obesity Mother     Breast cancer Mother     Hypertension Mother     Kidney disease Mother     Heart disease Father     Obesity Father     Obesity Sister     Obesity Maternal Grandmother     Obesity Sister     Colon cancer Neg Hx     Ovarian cancer Neg Hx        Social History     Socioeconomic History    Marital status:     Number of children: 3   Occupational History     Employer: [employer]   Tobacco Use    Smoking status: Never    Smokeless tobacco: Never   Substance and Sexual Activity    Alcohol use: Not Currently     Comment: occasionally    Drug use: Never    Sexual activity: Yes     Partners: Male     Birth control/protection: Other-see comments     Comment: tubal     Social Determinants of Health     Financial Resource Strain: Low Risk     Difficulty of Paying Living Expenses: Not hard at all   Food Insecurity: No Food Insecurity    Worried About Running Out of Food in the Last Year: Never true    Ran Out of Food in the Last Year: Never true   Transportation Needs: No Transportation Needs    Lack of Transportation (Medical): No    Lack of Transportation (Non-Medical): No   Physical Activity: Insufficiently Active    Days of Exercise per Week: 3 days    Minutes of Exercise per Session: 40 min   Stress: No Stress Concern Present    Feeling of Stress : Not at all   Social Connections: Unknown    Frequency of Communication with Friends and Family: More than three times a week    Frequency of Social Gatherings with Friends and Family: Twice a week    Active Member of Clubs or Organizations: Yes    Attends Club or Organization Meetings: More than 4 times per year    Marital Status:     Housing Stability: Low Risk     Unable to Pay for Housing in the Last Year: No    Number of Places Lived in the Last Year: 1    Unstable Housing in the Last Year: No       Medication List with Changes/Refills   Current Medications    ACETAMINOPHEN (TYLENOL) 500 MG TABLET    Take 500 mg by mouth every 6 (six) hours as needed.    AMLODIPINE (NORVASC) 5 MG TABLET    TAKE 1 TABLET BY MOUTH EVERY DAY    DICLOFENAC SODIUM (VOLTAREN) 1 % GEL    Apply 2 g topically 4 (four) times daily.    IPRATROPIUM (ATROVENT) 21 MCG (0.03 %) NASAL SPRAY    2 sprays by Each Nostril route 3 (three) times daily as needed for Rhinitis.    MECLIZINE (ANTIVERT) 12.5 MG TABLET    Take 1 tablet (12.5 mg total) by mouth 3 (three) times daily as needed for Dizziness.    METOPROLOL SUCCINATE (TOPROL-XL) 50 MG 24 HR TABLET    Take 50 mg by mouth once daily at 6am.    PANTOPRAZOLE (PROTONIX) 40 MG TABLET    TAKE 1 TABLET BY MOUTH EVERY DAY    SERTRALINE (ZOLOFT) 25 MG TABLET    Take 1 tablet (25 mg total) by mouth once daily.    TEMAZEPAM (RESTORIL) 15 MG CAP    TAKE 1 CAPSULE BY MOUTH NIGHTLY AS NEEDED FOR SLEEP.       Review of patient's allergies indicates:   Allergen Reactions    Adhesive Swelling     Swelling x2 with flu shot and band-aid.  Swells in shape of band-aid.    Lisinopril Other (See Comments)     Cough    Latex, natural rubber Swelling     Localized swelling at site of bandaid/adhesive         Objective:     Lower Extremity    Right KNEE:  ROM: passive flex/ ext 0-120  Patella midling, crepitus noted   Ligaments stable  Pain on palpation to medial aspect as well as the anterior patella  Calf NT, soft   (-) Manny sign  DF/PF full  Wiggles toes  Sensation intact to light touch   No pitting edema appreciated   NVI  Cap refill < 2 sec    Left KNEE:  ROM: passive flex/ ext 0-120  Patella midling, crepitus noted   Ligaments stable  Pain on palpation to medial aspect  Calf NT, soft  (-) Manny sign  DF/PF full  Wiggles toes  Sensation  intact to light touch   No pitting edema appreciated   NVI  Cap refill < 2 sec    Skin warm to touch, no obvious lesion noted     Skin warm to touch, no obvious lesion noted       IMAGING:    XRAY:  X-rays from 2021   FINDINGS:  Progression of bilateral tricompartmental degenerative changes noted most severe within the medial compartments with near complete joint space loss.  Small bilateral suprapatellar joint effusions.  No acute osseous abnormality.    Kellgren Jatin scale :  3     Assessment:       Encounter Diagnosis   Name Primary?    Primary osteoarthritis of both knees Yes          Plan:       Mary was seen today for pain and pain.    Diagnoses and all orders for this visit:    Primary osteoarthritis of both knees  -     X-Ray Knee 3 View Bilateral; Future  -     Large Joint Aspiration/Injection: bilateral knee        Mary Russo is a new patient who presents to me today with chief complaint of bilateral knee pain, right greater than left. We had a long discussion today regarding degenerative arthritis in the knees. The patient understands that arthritis is chronic and will worsen over time.  The patient also understands that arthritis may cause episodic flare-ups in pain. Management or if arthritis is achieved through a multi-modal approach including weight loss in obese individuals, activity modification, NSAIDs (topical vs oral) where appropriate, periodic intra-articular steroid injections, viscosupplementation, physical therapy, knee bracing, ambulatory aids, as well as geniculate nerve blocks.  The patient elected to proceed with Euflexxa injection 3 of 3.  She was instructed for light activity with elevation and ice as needed as well as to hold off on any submerging of water such as bath or pool today.  Additionally I provided her with a script of Celebrex which she is to take 1 pill in the morning after breakfast as needed on days where she feels.  Additionally we discussed  continuation of Voltaren gel.  She was encouraged to maintain her active lifestyle and low-impact activity such as stationary biking or water aerobics at the gym.  I would like to obtain updated x-rays today as her last imaging was from 2021 for full evaluation.  I will call with any acute questions or concerns and she may do the same.  I would like her to schedule a 3 month follow-up for further evaluation with either myself or Dr. Funes.         Patient verbalized understanding of all instructions and agreed with the above plan.    No follow-ups on file.    The patient understands, chooses and consents to this plan and accepts all   the risks which include but are not limited to the risks mentioned above.     Disclaimer: This note was prepared using a voice recognition system and is likely to have sound alike errors within the text.

## 2023-04-28 ENCOUNTER — PATIENT MESSAGE (OUTPATIENT)
Dept: SPORTS MEDICINE | Facility: CLINIC | Age: 53
End: 2023-04-28
Payer: COMMERCIAL

## 2023-05-04 RX ORDER — CELECOXIB 200 MG/1
CAPSULE ORAL
Qty: 30 CAPSULE | Refills: 0 | Status: SHIPPED | OUTPATIENT
Start: 2023-05-04 | End: 2023-06-12

## 2023-05-10 ENCOUNTER — OFFICE VISIT (OUTPATIENT)
Dept: INTERNAL MEDICINE | Facility: CLINIC | Age: 53
End: 2023-05-10
Payer: COMMERCIAL

## 2023-05-10 VITALS
WEIGHT: 254.75 LBS | RESPIRATION RATE: 15 BRPM | TEMPERATURE: 97 F | HEART RATE: 105 BPM | BODY MASS INDEX: 40.94 KG/M2 | OXYGEN SATURATION: 98 % | SYSTOLIC BLOOD PRESSURE: 130 MMHG | DIASTOLIC BLOOD PRESSURE: 88 MMHG | HEIGHT: 66 IN

## 2023-05-10 DIAGNOSIS — N95.1 MENOPAUSAL SYMPTOMS: ICD-10-CM

## 2023-05-10 DIAGNOSIS — K21.9 GASTROESOPHAGEAL REFLUX DISEASE, UNSPECIFIED WHETHER ESOPHAGITIS PRESENT: ICD-10-CM

## 2023-05-10 DIAGNOSIS — Z00.00 ROUTINE GENERAL MEDICAL EXAMINATION AT A HEALTH CARE FACILITY: Primary | ICD-10-CM

## 2023-05-10 DIAGNOSIS — D50.9 IRON DEFICIENCY ANEMIA, UNSPECIFIED IRON DEFICIENCY ANEMIA TYPE: ICD-10-CM

## 2023-05-10 DIAGNOSIS — I10 ESSENTIAL HYPERTENSION: ICD-10-CM

## 2023-05-10 DIAGNOSIS — E55.9 VITAMIN D DEFICIENCY: ICD-10-CM

## 2023-05-10 DIAGNOSIS — E66.01 MORBID OBESITY WITH BMI OF 40.0-44.9, ADULT: ICD-10-CM

## 2023-05-10 DIAGNOSIS — F32.A ANXIETY AND DEPRESSION: ICD-10-CM

## 2023-05-10 DIAGNOSIS — F41.9 ANXIETY AND DEPRESSION: ICD-10-CM

## 2023-05-10 PROCEDURE — 1159F PR MEDICATION LIST DOCUMENTED IN MEDICAL RECORD: ICD-10-PCS | Mod: CPTII,S$GLB,, | Performed by: FAMILY MEDICINE

## 2023-05-10 PROCEDURE — 3079F DIAST BP 80-89 MM HG: CPT | Mod: CPTII,S$GLB,, | Performed by: FAMILY MEDICINE

## 2023-05-10 PROCEDURE — 99396 PREV VISIT EST AGE 40-64: CPT | Mod: S$GLB,,, | Performed by: FAMILY MEDICINE

## 2023-05-10 PROCEDURE — 1160F RVW MEDS BY RX/DR IN RCRD: CPT | Mod: CPTII,S$GLB,, | Performed by: FAMILY MEDICINE

## 2023-05-10 PROCEDURE — 3008F BODY MASS INDEX DOCD: CPT | Mod: CPTII,S$GLB,, | Performed by: FAMILY MEDICINE

## 2023-05-10 PROCEDURE — 3075F PR MOST RECENT SYSTOLIC BLOOD PRESS GE 130-139MM HG: ICD-10-PCS | Mod: CPTII,S$GLB,, | Performed by: FAMILY MEDICINE

## 2023-05-10 PROCEDURE — 3079F PR MOST RECENT DIASTOLIC BLOOD PRESSURE 80-89 MM HG: ICD-10-PCS | Mod: CPTII,S$GLB,, | Performed by: FAMILY MEDICINE

## 2023-05-10 PROCEDURE — 1159F MED LIST DOCD IN RCRD: CPT | Mod: CPTII,S$GLB,, | Performed by: FAMILY MEDICINE

## 2023-05-10 PROCEDURE — 99396 PR PREVENTIVE VISIT,EST,40-64: ICD-10-PCS | Mod: S$GLB,,, | Performed by: FAMILY MEDICINE

## 2023-05-10 PROCEDURE — 3008F PR BODY MASS INDEX (BMI) DOCUMENTED: ICD-10-PCS | Mod: CPTII,S$GLB,, | Performed by: FAMILY MEDICINE

## 2023-05-10 PROCEDURE — 99999 PR PBB SHADOW E&M-EST. PATIENT-LVL IV: CPT | Mod: PBBFAC,,, | Performed by: FAMILY MEDICINE

## 2023-05-10 PROCEDURE — 3075F SYST BP GE 130 - 139MM HG: CPT | Mod: CPTII,S$GLB,, | Performed by: FAMILY MEDICINE

## 2023-05-10 PROCEDURE — 1160F PR REVIEW ALL MEDS BY PRESCRIBER/CLIN PHARMACIST DOCUMENTED: ICD-10-PCS | Mod: CPTII,S$GLB,, | Performed by: FAMILY MEDICINE

## 2023-05-10 PROCEDURE — 99999 PR PBB SHADOW E&M-EST. PATIENT-LVL IV: ICD-10-PCS | Mod: PBBFAC,,, | Performed by: FAMILY MEDICINE

## 2023-05-10 RX ORDER — METOPROLOL SUCCINATE 25 MG/1
25 TABLET, EXTENDED RELEASE ORAL DAILY
Qty: 90 TABLET | Refills: 3 | Status: SHIPPED | OUTPATIENT
Start: 2023-05-10 | End: 2023-11-16

## 2023-05-10 RX ORDER — AMLODIPINE BESYLATE 5 MG/1
5 TABLET ORAL DAILY
Qty: 90 TABLET | Refills: 3 | Status: SHIPPED | OUTPATIENT
Start: 2023-05-10 | End: 2024-05-09

## 2023-05-10 RX ORDER — FLUOXETINE 10 MG/1
10 CAPSULE ORAL DAILY
Qty: 90 CAPSULE | Refills: 3 | Status: SHIPPED | OUTPATIENT
Start: 2023-05-10 | End: 2024-05-09

## 2023-05-10 NOTE — ASSESSMENT & PLAN NOTE
Body mass index is 41.12 kg/m².  Improving; followed by Dr. London, Bariatric Surgery; advised to discuss ozempic and IF with him; can consider referral to Dr. Jane in the future if desired  Wt Readings from Last 10 Encounters:   05/10/23 115.5 kg (254 lb 11.9 oz)   04/03/23 115.7 kg (255 lb)   03/21/23 115.8 kg (255 lb 4.7 oz)   03/14/23 115.8 kg (255 lb 4.7 oz)   01/23/23 115.8 kg (255 lb 3.2 oz)   01/03/23 117.6 kg (259 lb 4.2 oz)   01/03/23 118.4 kg (261 lb)   08/22/22 118.5 kg (261 lb 3.2 oz)   08/02/22 121.3 kg (267 lb 6.7 oz)   05/12/22 126.6 kg (279 lb)

## 2023-05-10 NOTE — PROGRESS NOTES
Subjective:       Patient ID: Mary Russo is a 53 y.o. female.    Chief Complaint: Hot Flashes, medication review, and Annual Exam    Patient presents to clinic today for annual physical exam. Seeing Dr. Dimitrios London, Bariatric Surgery. He did labs in March, visit with him in May, next follow up in 3 months. Reports perimenopausal symptoms.    Wt Readings from Last 10 Encounters:  05/10/23 : 115.5 kg (254 lb 11.9 oz)  04/03/23 : 115.7 kg (255 lb)  03/21/23 : 115.8 kg (255 lb 4.7 oz)  03/14/23 : 115.8 kg (255 lb 4.7 oz)  01/23/23 : 115.8 kg (255 lb 3.2 oz)  01/03/23 : 117.6 kg (259 lb 4.2 oz)  01/03/23 : 118.4 kg (261 lb)  08/22/22 : 118.5 kg (261 lb 3.2 oz)  08/02/22 : 121.3 kg (267 lb 6.7 oz)  05/12/22 : 126.6 kg (279 lb)      Review of Systems   Constitutional:  Positive for fatigue. Negative for activity change, chills, fever and unexpected weight change.   HENT:  Negative for congestion, dental problem, ear pain, hearing loss, rhinorrhea and trouble swallowing.    Eyes:  Negative for pain, discharge and visual disturbance.   Respiratory:  Negative for cough, chest tightness, shortness of breath and wheezing.    Cardiovascular:  Positive for palpitations. Negative for chest pain and leg swelling.   Gastrointestinal:  Negative for abdominal distention, abdominal pain, blood in stool, constipation, diarrhea, nausea and vomiting.   Endocrine: Negative for polydipsia and polyuria.   Genitourinary:  Negative for difficulty urinating, dysuria, hematuria, menstrual problem and vaginal discharge.   Musculoskeletal:  Positive for arthralgias and joint swelling. Negative for myalgias and neck pain.   Skin:  Negative for rash.   Neurological:  Positive for dizziness (occasionally) and headaches (occasionally). Negative for weakness and numbness.   Hematological:  Negative for adenopathy. Bruises/bleeds easily (rare).   Psychiatric/Behavioral:  Positive for dysphoric mood (rare) and sleep disturbance. Negative  for confusion. The patient is nervous/anxious (rare).        Objective:      Physical Exam  Vitals reviewed.   Constitutional:       General: She is not in acute distress.     Appearance: Normal appearance. She is well-developed.   HENT:      Head: Normocephalic and atraumatic.      Right Ear: Tympanic membrane, ear canal and external ear normal.      Left Ear: Tympanic membrane, ear canal and external ear normal.      Nose: Nose normal. No mucosal edema or rhinorrhea.      Mouth/Throat:      Pharynx: Uvula midline.   Eyes:      General: Lids are normal. No scleral icterus.     Extraocular Movements: Extraocular movements intact.      Conjunctiva/sclera: Conjunctivae normal.      Pupils: Pupils are equal, round, and reactive to light.   Neck:      Thyroid: No thyromegaly.   Cardiovascular:      Rate and Rhythm: Normal rate and regular rhythm.      Heart sounds: No murmur heard.    No friction rub. No gallop.   Pulmonary:      Effort: Pulmonary effort is normal.      Breath sounds: Normal breath sounds. No wheezing, rhonchi or rales.   Abdominal:      General: Bowel sounds are normal. There is no distension.      Palpations: Abdomen is soft. There is no mass.      Tenderness: There is no abdominal tenderness.   Musculoskeletal:         General: Normal range of motion.      Cervical back: Normal range of motion and neck supple.   Lymphadenopathy:      Cervical: No cervical adenopathy.   Skin:     General: Skin is warm and dry.      Findings: No lesion or rash.      Nails: There is no clubbing.   Neurological:      Mental Status: She is alert and oriented to person, place, and time.      Cranial Nerves: No cranial nerve deficit.      Sensory: No sensory deficit.      Gait: Gait normal.   Psychiatric:         Mood and Affect: Mood and affect normal.       Assessment:       1. Routine general medical examination at a health care facility    2. Essential hypertension    3. Anxiety and depression    4. Menopausal symptoms     5. Iron deficiency anemia, unspecified iron deficiency anemia type    6. Morbid obesity with BMI of 40.0-44.9, adult    7. Vitamin D deficiency    8. Gastroesophageal reflux disease, unspecified whether esophagitis present        Plan:   1. Routine general medical examination at a health care facility    2. Essential hypertension  Assessment & Plan:  Controlled, continue amlodipine and resume metoprolol for improved diastolic control and to treat palpitations/mild tachycardia; nurse vital sign check in 2 weeks.    Orders:  -     amLODIPine (NORVASC) 5 MG tablet; Take 1 tablet (5 mg total) by mouth once daily.  Dispense: 90 tablet; Refill: 3  -     metoprolol succinate (TOPROL-XL) 25 MG 24 hr tablet; Take 1 tablet (25 mg total) by mouth once daily.  Dispense: 90 tablet; Refill: 3    3. Anxiety and depression  Assessment & Plan:  Discontinue zoloft and start prozac; follow up if worse/persistent    Orders:  -     FLUoxetine 10 MG capsule; Take 1 capsule (10 mg total) by mouth once daily.  Dispense: 90 capsule; Refill: 3    4. Menopausal symptoms  Assessment & Plan:  After discussion will try patient on prozac    Orders:  -     FLUoxetine 10 MG capsule; Take 1 capsule (10 mg total) by mouth once daily.  Dispense: 90 capsule; Refill: 3    5. Iron deficiency anemia, unspecified iron deficiency anemia type  Assessment & Plan:  Stable per labs in March by Dr. London      6. Morbid obesity with BMI of 40.0-44.9, adult  Assessment & Plan:  Body mass index is 41.12 kg/m².  Improving; followed by Dr. London, Bariatric Surgery; advised to discuss ozempic and IF with him; can consider referral to Dr. Jane in the future if desired  Wt Readings from Last 10 Encounters:   05/10/23 115.5 kg (254 lb 11.9 oz)   04/03/23 115.7 kg (255 lb)   03/21/23 115.8 kg (255 lb 4.7 oz)   03/14/23 115.8 kg (255 lb 4.7 oz)   01/23/23 115.8 kg (255 lb 3.2 oz)   01/03/23 117.6 kg (259 lb 4.2 oz)   01/03/23 118.4 kg (261 lb)   08/22/22 118.5 kg  (261 lb 3.2 oz)   08/02/22 121.3 kg (267 lb 6.7 oz)   05/12/22 126.6 kg (279 lb)           7. Vitamin D deficiency  Assessment & Plan:  Controlled supplement       8. Gastroesophageal reflux disease, unspecified whether esophagitis present  Assessment & Plan:  Takes protonix prn      Health Maintenance reviewed/updated.

## 2023-05-10 NOTE — ASSESSMENT & PLAN NOTE
Controlled, continue amlodipine and resume metoprolol for improved diastolic control and to treat palpitations/mild tachycardia; nurse vital sign check in 2 weeks.

## 2023-05-31 ENCOUNTER — CLINICAL SUPPORT (OUTPATIENT)
Dept: INTERNAL MEDICINE | Facility: CLINIC | Age: 53
End: 2023-05-31
Payer: COMMERCIAL

## 2023-05-31 ENCOUNTER — TELEPHONE (OUTPATIENT)
Dept: INTERNAL MEDICINE | Facility: CLINIC | Age: 53
End: 2023-05-31

## 2023-05-31 VITALS — SYSTOLIC BLOOD PRESSURE: 132 MMHG | DIASTOLIC BLOOD PRESSURE: 86 MMHG

## 2023-05-31 PROCEDURE — 99999 PR PBB SHADOW E&M-EST. PATIENT-LVL I: ICD-10-PCS | Mod: PBBFAC,,,

## 2023-05-31 PROCEDURE — 99999 PR PBB SHADOW E&M-EST. PATIENT-LVL I: CPT | Mod: PBBFAC,,,

## 2023-05-31 NOTE — PROGRESS NOTES
Pt presents to clinic for NV bp check. Two pt identifiers used. Pt verbalized understanding of reason for visit.  Pts manual bp taken and was 132/86.  Pt stated she had only taken Amlodipine this morning and asked if she needed to take both of her blood pressure medications in the morning or keep only taking the Amlodpine in the am.  Nurse advised that she would consult with PCP and send portal message. Pt verbalized understanding and was escorted back to [facility].  PCP consulted and message sent to pt.

## 2023-06-12 RX ORDER — CELECOXIB 200 MG/1
CAPSULE ORAL
Qty: 30 CAPSULE | Refills: 0 | Status: SHIPPED | OUTPATIENT
Start: 2023-06-12 | End: 2023-09-29

## 2023-06-30 ENCOUNTER — OFFICE VISIT (OUTPATIENT)
Dept: SPORTS MEDICINE | Facility: CLINIC | Age: 53
End: 2023-06-30
Payer: COMMERCIAL

## 2023-06-30 DIAGNOSIS — M17.0 PRIMARY OSTEOARTHRITIS OF BOTH KNEES: Primary | ICD-10-CM

## 2023-06-30 PROCEDURE — 99214 OFFICE O/P EST MOD 30 MIN: CPT | Mod: 25,S$GLB,, | Performed by: STUDENT IN AN ORGANIZED HEALTH CARE EDUCATION/TRAINING PROGRAM

## 2023-06-30 PROCEDURE — 1159F MED LIST DOCD IN RCRD: CPT | Mod: CPTII,S$GLB,, | Performed by: STUDENT IN AN ORGANIZED HEALTH CARE EDUCATION/TRAINING PROGRAM

## 2023-06-30 PROCEDURE — 1159F PR MEDICATION LIST DOCUMENTED IN MEDICAL RECORD: ICD-10-PCS | Mod: CPTII,S$GLB,, | Performed by: STUDENT IN AN ORGANIZED HEALTH CARE EDUCATION/TRAINING PROGRAM

## 2023-06-30 PROCEDURE — 20610 LARGE JOINT ASPIRATION/INJECTION: BILATERAL KNEE: ICD-10-PCS | Mod: 50,S$GLB,, | Performed by: STUDENT IN AN ORGANIZED HEALTH CARE EDUCATION/TRAINING PROGRAM

## 2023-06-30 PROCEDURE — 99214 PR OFFICE/OUTPT VISIT, EST, LEVL IV, 30-39 MIN: ICD-10-PCS | Mod: 25,S$GLB,, | Performed by: STUDENT IN AN ORGANIZED HEALTH CARE EDUCATION/TRAINING PROGRAM

## 2023-06-30 PROCEDURE — 1160F PR REVIEW ALL MEDS BY PRESCRIBER/CLIN PHARMACIST DOCUMENTED: ICD-10-PCS | Mod: CPTII,S$GLB,, | Performed by: STUDENT IN AN ORGANIZED HEALTH CARE EDUCATION/TRAINING PROGRAM

## 2023-06-30 PROCEDURE — 1160F RVW MEDS BY RX/DR IN RCRD: CPT | Mod: CPTII,S$GLB,, | Performed by: STUDENT IN AN ORGANIZED HEALTH CARE EDUCATION/TRAINING PROGRAM

## 2023-06-30 PROCEDURE — 20610 DRAIN/INJ JOINT/BURSA W/O US: CPT | Mod: 50,S$GLB,, | Performed by: STUDENT IN AN ORGANIZED HEALTH CARE EDUCATION/TRAINING PROGRAM

## 2023-06-30 PROCEDURE — 99999 PR PBB SHADOW E&M-EST. PATIENT-LVL III: ICD-10-PCS | Mod: PBBFAC,,, | Performed by: STUDENT IN AN ORGANIZED HEALTH CARE EDUCATION/TRAINING PROGRAM

## 2023-06-30 PROCEDURE — 99999 PR PBB SHADOW E&M-EST. PATIENT-LVL III: CPT | Mod: PBBFAC,,, | Performed by: STUDENT IN AN ORGANIZED HEALTH CARE EDUCATION/TRAINING PROGRAM

## 2023-06-30 RX ORDER — TRIAMCINOLONE ACETONIDE 40 MG/ML
40 INJECTION, SUSPENSION INTRA-ARTICULAR; INTRAMUSCULAR
Status: DISCONTINUED | OUTPATIENT
Start: 2023-06-30 | End: 2023-06-30 | Stop reason: HOSPADM

## 2023-06-30 RX ADMIN — TRIAMCINOLONE ACETONIDE 40 MG: 40 INJECTION, SUSPENSION INTRA-ARTICULAR; INTRAMUSCULAR at 03:06

## 2023-06-30 NOTE — PATIENT INSTRUCTIONS
Assessment:  Mary Russo is a 53 y.o. female No chief complaint on file.      No diagnosis found.     Plan:  Bilateral cortisone injection to knees  We discussed the proper protocols after the injection such as no submerging pools, baths tubs, or hot tubs for 24 hr.  Showering is okay today.  We also discussed that blood sugars can be elevated after an injection and asked patient to properly checked her sugars over the next few days and contact their PCP if there are any concerns.  We discussed red flags such as fevers, chills, red, warm, tender joint at the area of injection to please seek medical care immediately.    Apply topical diclofenac (Voltaren) up to 4 times a day to the affected area.  It can be bought over the counter at any local pharmacy.    Patient may ice every 2 hours for 15 minutes as needed to control pain and swelling.   Follow up as needed          Follow-up: as needed or sooner if there are any problems between now and then.    Thank you for choosing [facility] and Dr. Skip Rich for your orthopedic & sports medicine care. It is our goal to provide you with exceptional care that will help keep you healthy, active, and get you back in the game.    Please do not hesitate to reach out to us via email, phone, or MyChart with any questions, concerns, or feedback.    If you felt that you received exemplary care today, please consider leaving us feedback on InstantQs at:  [email or web address]    If you are experiencing pain/discomfort ,or have questions after 5pm and would like to be connected to the [facility] on-call team, please call this number and specify which Sports Medicine provider is treating you: [phone]

## 2023-06-30 NOTE — PROGRESS NOTES
Patient ID: Mary Russo  YOB: 1970  MRN: [id number]    Chief Complaint: Pain of the Left Knee and Pain of the Right Knee      History of Present Illness: Mary Russo is a right-hand dominant 53 y.o. female who presents for follow up s/p  bilateral knee Euflexxa, last one on 4/2023, she states that injections did not help at all. She is still having a constant, stabbing pain. CSI on 1/03/23. She states that Cortizone injection helped with knee pain, she is now taking less tylenol and applying Voltaren gel less often. Her pain is 3/10 today.      Occupation: Human Resources      Past Medical History:   Past Medical History:   Diagnosis Date    Acid reflux     Anxiety     Arthritis     Carpal tunnel syndrome of right wrist     Cholelithiases     Hypertension     Iron deficiency     Morbid obesity      Past Surgical History:   Procedure Laterality Date    CHOLECYSTECTOMY      ESOPHAGOGASTRODUODENOSCOPY N/A 10/14/2019    Procedure: ESOPHAGOGASTRODUODENOSCOPY (EGD);  Surgeon: Stefanie Monique MD;  Location: [facility];  Service: Endoscopy;  Laterality: N/A;    ESOPHAGOGASTRODUODENOSCOPY N/A 12/23/2019    Procedure: EGD (ESOPHAGOGASTRODUODENOSCOPY);  Surgeon: Stefanie Monique MD;  Location: [facility];  Service: Endoscopy;  Laterality: N/A;    EYE SURGERY  2/2018    Lasik    HERNIA REPAIR      LAPAROSCOPIC GASTRIC BANDING  10/31/2017    removed    REFRACTIVE SURGERY      TUBAL LIGATION       Family History   Problem Relation Age of Onset    Cancer Mother         breast cancer    Heart disease Mother     Diabetes Mother     Stroke Mother     Obesity Mother     Breast cancer Mother     Hypertension Mother     Kidney disease Mother     Heart disease Father     Obesity Father     Obesity Sister     Obesity Maternal Grandmother     Obesity Sister     Colon cancer Neg Hx     Ovarian cancer Neg Hx      Social History     Socioeconomic History    Marital status:     Number of  children: 3   Occupational History     Employer: [employer]   Tobacco Use    Smoking status: Never    Smokeless tobacco: Never   Substance and Sexual Activity    Alcohol use: Not Currently     Alcohol/week: 1.0 standard drink     Types: 1 Glasses of wine per week     Comment: occasionally    Drug use: Never    Sexual activity: Yes     Partners: Male     Birth control/protection: Coitus interruptus, Other-see comments     Comment: tubal     Social Determinants of Health     Financial Resource Strain: Low Risk     Difficulty of Paying Living Expenses: Not hard at all   Food Insecurity: No Food Insecurity    Worried About Running Out of Food in the Last Year: Never true    Ran Out of Food in the Last Year: Never true   Transportation Needs: No Transportation Needs    Lack of Transportation (Medical): No    Lack of Transportation (Non-Medical): No   Physical Activity: Insufficiently Active    Days of Exercise per Week: 2 days    Minutes of Exercise per Session: 30 min   Stress: Stress Concern Present    Feeling of Stress : To some extent   Social Connections: Unknown    Frequency of Communication with Friends and Family: More than three times a week    Frequency of Social Gatherings with Friends and Family: More than three times a week    Active Member of Clubs or Organizations: Yes    Attends Club or Organization Meetings: More than 4 times per year    Marital Status:    Housing Stability: Low Risk     Unable to Pay for Housing in the Last Year: No    Number of Places Lived in the Last Year: 1    Unstable Housing in the Last Year: No     Medication List with Changes/Refills   Current Medications    ACETAMINOPHEN (TYLENOL) 500 MG TABLET    Take 500 mg by mouth every 6 (six) hours as needed.    AMLODIPINE (NORVASC) 5 MG TABLET    Take 1 tablet (5 mg total) by mouth once daily.    CELECOXIB (CELEBREX) 200 MG CAPSULE    TAKE 1 CAPSULE BY MOUTH ONCE DAILY    DICLOFENAC SODIUM  (VOLTAREN) 1 % GEL    Apply 2 g topically 4 (four) times daily.    FLUOXETINE 10 MG CAPSULE    Take 1 capsule (10 mg total) by mouth once daily.    MECLIZINE (ANTIVERT) 12.5 MG TABLET    Take 1 tablet (12.5 mg total) by mouth 3 (three) times daily as needed for Dizziness.    METOPROLOL SUCCINATE (TOPROL-XL) 25 MG 24 HR TABLET    Take 1 tablet (25 mg total) by mouth once daily.    PANTOPRAZOLE (PROTONIX) 40 MG TABLET    TAKE 1 TABLET BY MOUTH EVERY DAY    TEMAZEPAM (RESTORIL) 15 MG CAP    TAKE 1 CAPSULE BY MOUTH NIGHTLY AS NEEDED FOR SLEEP.     Review of patient's allergies indicates:   Allergen Reactions    Adhesive Swelling     Swelling x2 with flu shot and band-aid.  Swells in shape of band-aid.    Lisinopril Other (See Comments)     Cough    Latex, natural rubber Swelling     Localized swelling at site of bandaid/adhesive       Physical Exam:   There is no height or weight on file to calculate BMI.    GENERAL: Well appearing, in no acute distress.  HEAD: Normocephalic and atraumatic.  ENT: External ears and nose grossly normal.  EYES: EOMI bilaterally  PULMONARY: Respirations are grossly even and non-labored.  NEURO: Awake, alert, and oriented x 3.  SKIN: No obvious rashes appreciated.  PSYCH: Mood & affect are appropriate.    Detailed MSK exam:     Left knee exam:   -ROM: extension 0, flexion 120  -TTP: Medial joint line and Lateral joint line  -effusion: trace  -Patellar apprehension negative  -Hunter test negative  -stable to varus and valgus stress tests  -Lachman test negative, anterior drawer test negative, posterior drawer test negative     Right knee exam:   -ROM: extension 0, flexion 120  -TTP: Medial joint line and Lateral joint line  -effusion: trace  -Patellar apprehension negative  -Hunter test negative  -stable to varus and valgus stress tests  -Lachman test negative, anterior drawer test negative, posterior drawer test negative    Imaging:    Relevant imaging results were reviewed and  interpreted by me and per my read shows moderate arthritic changes with medial compartment joint space narrowing and osteophytes.  This was discussed with the patient and / or family today.     Assessment:  Mary Russo is a 53 y.o. female following up for bilateral knee pain. Gel injections didn't last long. She got the most relief from the steroid injections and is interested in repeating today.   Plan: Steroid injection given today (see separate procedure note for details). We discussed the proper protocols after the injection such as no submerging pools, baths tubs, or hot tubs for 24 hr.  Showering is okay today.  We also discussed that blood sugars can be elevated after an injection and asked patient to properly checked her sugars over the next few days and contact their PCP if there are any concerns.  We discussed red flags such as fevers, chills, red, warm, tender joint at the area of injection to please seek medical care immediately. Continue conservative management for pain. Consider joint specialist referral if not improving.   Follow up as needed. All questions answered.     Primary osteoarthritis of both knees  -     Large Joint Aspiration/Injection: bilateral knee           Electronically signed:  Skip Rich MD, MPH  06/30/2023  8:56 AM

## 2023-06-30 NOTE — PROCEDURES
Large Joint Aspiration/Injection: bilateral knee    Date/Time: 6/30/2023 3:20 PM  Performed by: Skip Rich MD  Authorized by: Skip Rich MD     Consent Done?:  Yes (Verbal)  Indications:  Arthritis and pain  Site marked: the procedure site was marked    Timeout: prior to procedure the correct patient, procedure, and site was verified    Prep: patient was prepped and draped in usual sterile fashion    Local anesthetic:  Bupivacaine 0.5% without epinephrine and lidocaine 1% without epinephrine    Details:  Needle Size:  22 G  Approach:  Anterolateral  Location:  Knee  Laterality:  Bilateral  Site:  Bilateral knee  Medications (Right):  40 mg triamcinolone acetonide 40 mg/mL  Medications (Left):  40 mg triamcinolone acetonide 40 mg/mL  Patient tolerance:  Patient tolerated the procedure well with no immediate complications

## 2023-09-07 ENCOUNTER — OFFICE VISIT (OUTPATIENT)
Dept: INTERNAL MEDICINE | Facility: CLINIC | Age: 53
End: 2023-09-07
Payer: COMMERCIAL

## 2023-09-07 VITALS
TEMPERATURE: 97 F | WEIGHT: 257.69 LBS | SYSTOLIC BLOOD PRESSURE: 118 MMHG | BODY MASS INDEX: 41.6 KG/M2 | DIASTOLIC BLOOD PRESSURE: 82 MMHG

## 2023-09-07 DIAGNOSIS — J06.9 UPPER RESPIRATORY TRACT INFECTION, UNSPECIFIED TYPE: Primary | ICD-10-CM

## 2023-09-07 DIAGNOSIS — R05.9 COUGH, UNSPECIFIED TYPE: ICD-10-CM

## 2023-09-07 LAB
CTP QC/QA: YES
SARS-COV-2 RDRP RESP QL NAA+PROBE: NEGATIVE

## 2023-09-07 PROCEDURE — 1159F MED LIST DOCD IN RCRD: CPT | Mod: CPTII,S$GLB,, | Performed by: PHYSICIAN ASSISTANT

## 2023-09-07 PROCEDURE — 87635 SARS-COV-2 COVID-19 AMP PRB: CPT | Mod: QW,S$GLB,, | Performed by: PHYSICIAN ASSISTANT

## 2023-09-07 PROCEDURE — 3079F DIAST BP 80-89 MM HG: CPT | Mod: CPTII,S$GLB,, | Performed by: PHYSICIAN ASSISTANT

## 2023-09-07 PROCEDURE — 99999 PR PBB SHADOW E&M-EST. PATIENT-LVL IV: ICD-10-PCS | Mod: PBBFAC,,, | Performed by: PHYSICIAN ASSISTANT

## 2023-09-07 PROCEDURE — 3074F PR MOST RECENT SYSTOLIC BLOOD PRESSURE < 130 MM HG: ICD-10-PCS | Mod: CPTII,S$GLB,, | Performed by: PHYSICIAN ASSISTANT

## 2023-09-07 PROCEDURE — 99999 PR PBB SHADOW E&M-EST. PATIENT-LVL IV: CPT | Mod: PBBFAC,,, | Performed by: PHYSICIAN ASSISTANT

## 2023-09-07 PROCEDURE — 3008F BODY MASS INDEX DOCD: CPT | Mod: CPTII,S$GLB,, | Performed by: PHYSICIAN ASSISTANT

## 2023-09-07 PROCEDURE — 3008F PR BODY MASS INDEX (BMI) DOCUMENTED: ICD-10-PCS | Mod: CPTII,S$GLB,, | Performed by: PHYSICIAN ASSISTANT

## 2023-09-07 PROCEDURE — 99213 PR OFFICE/OUTPT VISIT, EST, LEVL III, 20-29 MIN: ICD-10-PCS | Mod: S$GLB,,, | Performed by: PHYSICIAN ASSISTANT

## 2023-09-07 PROCEDURE — 1159F PR MEDICATION LIST DOCUMENTED IN MEDICAL RECORD: ICD-10-PCS | Mod: CPTII,S$GLB,, | Performed by: PHYSICIAN ASSISTANT

## 2023-09-07 PROCEDURE — 87635: ICD-10-PCS | Mod: QW,S$GLB,, | Performed by: PHYSICIAN ASSISTANT

## 2023-09-07 PROCEDURE — 99213 OFFICE O/P EST LOW 20 MIN: CPT | Mod: S$GLB,,, | Performed by: PHYSICIAN ASSISTANT

## 2023-09-07 PROCEDURE — 3074F SYST BP LT 130 MM HG: CPT | Mod: CPTII,S$GLB,, | Performed by: PHYSICIAN ASSISTANT

## 2023-09-07 PROCEDURE — 3079F PR MOST RECENT DIASTOLIC BLOOD PRESSURE 80-89 MM HG: ICD-10-PCS | Mod: CPTII,S$GLB,, | Performed by: PHYSICIAN ASSISTANT

## 2023-09-07 PROCEDURE — 1160F PR REVIEW ALL MEDS BY PRESCRIBER/CLIN PHARMACIST DOCUMENTED: ICD-10-PCS | Mod: CPTII,S$GLB,, | Performed by: PHYSICIAN ASSISTANT

## 2023-09-07 PROCEDURE — 1160F RVW MEDS BY RX/DR IN RCRD: CPT | Mod: CPTII,S$GLB,, | Performed by: PHYSICIAN ASSISTANT

## 2023-09-07 RX ORDER — DEXBROMPHENIRAMINE MALEATE, PHENYLEPHRINE HYDROCHLORIDE 2; 7.5 MG/1; MG/1
1 TABLET ORAL
Qty: 20 TABLET | Refills: 0 | Status: SHIPPED | OUTPATIENT
Start: 2023-09-07 | End: 2023-09-27

## 2023-09-07 RX ORDER — AMOXICILLIN 875 MG/1
875 TABLET, FILM COATED ORAL 2 TIMES DAILY
COMMUNITY
Start: 2023-09-02 | End: 2023-09-27

## 2023-09-07 RX ORDER — AZELASTINE 1 MG/ML
1 SPRAY, METERED NASAL 2 TIMES DAILY
Qty: 30 ML | Refills: 0 | Status: SHIPPED | OUTPATIENT
Start: 2023-09-07 | End: 2023-09-27

## 2023-09-07 RX ORDER — TEMAZEPAM 15 MG/1
CAPSULE ORAL
Status: CANCELLED | OUTPATIENT
Start: 2023-09-07

## 2023-09-07 NOTE — PROGRESS NOTES
Subjective:       Patient ID: Mary Russo is a 53 y.o. female.    Chief Complaint: Otalgia (Patient stated that she went to after hours last Wednesday and had strep/covid and both came back negative. She has a red spot in the back of her throat and was prescribed amoxicillin and she has two pills left and the pain is still there. She wants to see if someone else can look at it and tell her what it is that is bothering her. )      Sore Throat   This is a new problem. The current episode started in the past 7 days. The problem has been gradually improving. Neither side of throat is experiencing more pain than the other. There has been no fever. The pain is at a severity of 4/10. The pain is moderate. Associated symptoms include coughing, ear pain, headaches and neck pain. Pertinent negatives include no abdominal pain, congestion, diarrhea, drooling, ear discharge, hoarse voice, plugged ear sensation, shortness of breath, stridor, swollen glands, trouble swallowing or vomiting. She has had no exposure to strep or mono. She has tried acetaminophen for the symptoms. The treatment provided mild relief.       Review of Systems   HENT:  Positive for ear pain and sore throat. Negative for congestion, drooling, ear discharge, hoarse voice and trouble swallowing.    Respiratory:  Positive for cough. Negative for shortness of breath and stridor.    Gastrointestinal:  Negative for abdominal pain, diarrhea and vomiting.   Musculoskeletal:  Positive for neck pain.   Neurological:  Positive for headaches.       Objective:      Physical Exam  Vitals and nursing note reviewed.   Constitutional:       General: She is not in acute distress.     Appearance: She is well-developed.   HENT:      Head: Normocephalic and atraumatic.      Right Ear: Tympanic membrane, ear canal and external ear normal.      Left Ear: Tympanic membrane, ear canal and external ear normal.      Nose: No mucosal edema.      Comments: Erythematous nasal  mucosa noted     Mouth/Throat:      Lips: Pink.      Mouth: Mucous membranes are moist.      Pharynx: No posterior oropharyngeal erythema.      Comments: Clear PND noted to posterior pharynx  Eyes:      General: Lids are normal. No scleral icterus.     Extraocular Movements: Extraocular movements intact.      Conjunctiva/sclera: Conjunctivae normal.   Cardiovascular:      Rate and Rhythm: Normal rate and regular rhythm.   Pulmonary:      Effort: Pulmonary effort is normal.      Breath sounds: Normal breath sounds. No decreased breath sounds, wheezing, rhonchi or rales.   Neurological:      Mental Status: She is alert.      Cranial Nerves: No cranial nerve deficit.   Psychiatric:         Mood and Affect: Mood and affect normal.         Assessment:       1. Upper respiratory tract infection, unspecified type    2. Cough, unspecified type        Plan:   1. Upper respiratory tract infection, unspecified type    2. Cough, unspecified type  -     POCT COVID-19 Rapid Screening  -     azelastine (ASTELIN) 137 mcg (0.1 %) nasal spray; 1 spray (137 mcg total) by Nasal route 2 (two) times daily.  Dispense: 30 mL; Refill: 0  -     dexbrompheniramine-phenyleph (ALAHIST PE) 2-7.5 mg Tab; Take 1 tablet by mouth every 4 to 6 hours as needed (congestion).  Dispense: 20 tablet; Refill: 0      Covid test neg  Reassured patient. Discussed natural history of viral URI. Advised to push fluids and get plenty of rest. If symptoms worse or not improving after 7-10 days patient will notify me.

## 2023-09-21 ENCOUNTER — PATIENT MESSAGE (OUTPATIENT)
Dept: SPORTS MEDICINE | Facility: CLINIC | Age: 53
End: 2023-09-21
Payer: COMMERCIAL

## 2023-09-21 ENCOUNTER — PATIENT MESSAGE (OUTPATIENT)
Dept: ADMINISTRATIVE | Facility: HOSPITAL | Age: 53
End: 2023-09-21
Payer: COMMERCIAL

## 2023-09-21 DIAGNOSIS — Z12.11 SCREENING FOR COLON CANCER: ICD-10-CM

## 2023-09-25 ENCOUNTER — TELEPHONE (OUTPATIENT)
Dept: ORTHOPEDICS | Facility: CLINIC | Age: 53
End: 2023-09-25
Payer: COMMERCIAL

## 2023-09-27 ENCOUNTER — TELEPHONE (OUTPATIENT)
Dept: SPORTS MEDICINE | Facility: CLINIC | Age: 53
End: 2023-09-27
Payer: COMMERCIAL

## 2023-09-27 ENCOUNTER — OFFICE VISIT (OUTPATIENT)
Dept: SPORTS MEDICINE | Facility: CLINIC | Age: 53
End: 2023-09-27
Payer: COMMERCIAL

## 2023-09-27 VITALS — WEIGHT: 257.69 LBS | HEIGHT: 66 IN | BODY MASS INDEX: 41.42 KG/M2

## 2023-09-27 DIAGNOSIS — M17.0 PRIMARY OSTEOARTHRITIS OF BOTH KNEES: Primary | ICD-10-CM

## 2023-09-27 PROCEDURE — 3008F BODY MASS INDEX DOCD: CPT | Mod: CPTII,S$GLB,, | Performed by: STUDENT IN AN ORGANIZED HEALTH CARE EDUCATION/TRAINING PROGRAM

## 2023-09-27 PROCEDURE — 99214 OFFICE O/P EST MOD 30 MIN: CPT | Mod: 25,S$GLB,, | Performed by: STUDENT IN AN ORGANIZED HEALTH CARE EDUCATION/TRAINING PROGRAM

## 2023-09-27 PROCEDURE — 99214 PR OFFICE/OUTPT VISIT, EST, LEVL IV, 30-39 MIN: ICD-10-PCS | Mod: 25,S$GLB,, | Performed by: STUDENT IN AN ORGANIZED HEALTH CARE EDUCATION/TRAINING PROGRAM

## 2023-09-27 PROCEDURE — 1160F RVW MEDS BY RX/DR IN RCRD: CPT | Mod: CPTII,S$GLB,, | Performed by: STUDENT IN AN ORGANIZED HEALTH CARE EDUCATION/TRAINING PROGRAM

## 2023-09-27 PROCEDURE — 1160F PR REVIEW ALL MEDS BY PRESCRIBER/CLIN PHARMACIST DOCUMENTED: ICD-10-PCS | Mod: CPTII,S$GLB,, | Performed by: STUDENT IN AN ORGANIZED HEALTH CARE EDUCATION/TRAINING PROGRAM

## 2023-09-27 PROCEDURE — 20610 LARGE JOINT ASPIRATION/INJECTION: BILATERAL KNEE: ICD-10-PCS | Mod: 50,S$GLB,, | Performed by: STUDENT IN AN ORGANIZED HEALTH CARE EDUCATION/TRAINING PROGRAM

## 2023-09-27 PROCEDURE — 3008F PR BODY MASS INDEX (BMI) DOCUMENTED: ICD-10-PCS | Mod: CPTII,S$GLB,, | Performed by: STUDENT IN AN ORGANIZED HEALTH CARE EDUCATION/TRAINING PROGRAM

## 2023-09-27 PROCEDURE — 20610 DRAIN/INJ JOINT/BURSA W/O US: CPT | Mod: 50,S$GLB,, | Performed by: STUDENT IN AN ORGANIZED HEALTH CARE EDUCATION/TRAINING PROGRAM

## 2023-09-27 PROCEDURE — 99999 PR PBB SHADOW E&M-EST. PATIENT-LVL IV: CPT | Mod: PBBFAC,,, | Performed by: STUDENT IN AN ORGANIZED HEALTH CARE EDUCATION/TRAINING PROGRAM

## 2023-09-27 PROCEDURE — 1159F MED LIST DOCD IN RCRD: CPT | Mod: CPTII,S$GLB,, | Performed by: STUDENT IN AN ORGANIZED HEALTH CARE EDUCATION/TRAINING PROGRAM

## 2023-09-27 PROCEDURE — 99999 PR PBB SHADOW E&M-EST. PATIENT-LVL IV: ICD-10-PCS | Mod: PBBFAC,,, | Performed by: STUDENT IN AN ORGANIZED HEALTH CARE EDUCATION/TRAINING PROGRAM

## 2023-09-27 PROCEDURE — 1159F PR MEDICATION LIST DOCUMENTED IN MEDICAL RECORD: ICD-10-PCS | Mod: CPTII,S$GLB,, | Performed by: STUDENT IN AN ORGANIZED HEALTH CARE EDUCATION/TRAINING PROGRAM

## 2023-09-27 RX ORDER — TRIAMCINOLONE ACETONIDE 40 MG/ML
40 INJECTION, SUSPENSION INTRA-ARTICULAR; INTRAMUSCULAR
Status: DISCONTINUED | OUTPATIENT
Start: 2023-09-27 | End: 2023-09-27 | Stop reason: HOSPADM

## 2023-09-27 RX ADMIN — TRIAMCINOLONE ACETONIDE 40 MG: 40 INJECTION, SUSPENSION INTRA-ARTICULAR; INTRAMUSCULAR at 03:09

## 2023-09-27 NOTE — TELEPHONE ENCOUNTER
Spoke with patient at 3:10 and she is at the [place] exit.  Told patient to be careful and we would see her when she arrives.   ----- Message from Dinh Sotelo sent at 9/27/2023  2:54 PM CDT -----  Contact: Mary  Patient is calling to speak with someone regarding visit. Reports to be arriving to appointment . Please give patient a call back at [phone] if necessary.  Thank you,  TH

## 2023-09-27 NOTE — PROGRESS NOTES
Patient ID: Mary Russo  YOB: 1970  MRN: [id number]    Chief Complaint: Pain of the Left Knee and Pain of the Right Knee           History of Present Illness: Mary Russo is a right-hand dominant 53 y.o. female who presents for follow up s/p  bilateral knee pain. She had  Euflexxa,  on 4/2023, she states that injections did not help at all. She is still having a constant, stabbing pain. CSI on 6/30/2023 . She states that Cortizone injection helped with knee pain, she is now taking less tylenol and applying Voltaren gel less often. Her pain is 7/10 today. She is here today for CSI in both knee.               Past Medical History:   Past Medical History:   Diagnosis Date    Acid reflux     Anxiety     Arthritis     Carpal tunnel syndrome of right wrist     Cholelithiases     Hypertension     Iron deficiency     Morbid obesity      Past Surgical History:   Procedure Laterality Date    CHOLECYSTECTOMY      ESOPHAGOGASTRODUODENOSCOPY N/A 10/14/2019    Procedure: ESOPHAGOGASTRODUODENOSCOPY (EGD);  Surgeon: Stefanie Monique MD;  Location: [facility];  Service: Endoscopy;  Laterality: N/A;    ESOPHAGOGASTRODUODENOSCOPY N/A 12/23/2019    Procedure: EGD (ESOPHAGOGASTRODUODENOSCOPY);  Surgeon: Stefanie Monique MD;  Location: [facility];  Service: Endoscopy;  Laterality: N/A;    EYE SURGERY  2/2018    Lasik    HERNIA REPAIR      LAPAROSCOPIC GASTRIC BANDING  10/31/2017    removed    REFRACTIVE SURGERY      TUBAL LIGATION       Family History   Problem Relation Age of Onset    Cancer Mother 49        breast cancer    Heart disease Mother     Diabetes Mother     Stroke Mother     Obesity Mother     Breast cancer Mother     Hypertension Mother     Kidney disease Mother     Heart disease Father     Obesity Father     Obesity Sister     Obesity Maternal Grandmother     Obesity Sister     Colon cancer Neg Hx     Ovarian cancer Neg Hx      Social History     Socioeconomic History    Marital  status:     Number of children: 3   Occupational History     Employer: [employer]   Tobacco Use    Smoking status: Never    Smokeless tobacco: Never   Substance and Sexual Activity    Alcohol use: Not Currently     Alcohol/week: 1.0 standard drink of alcohol     Types: 1 Glasses of wine per week     Comment: occasionally    Drug use: Never    Sexual activity: Yes     Partners: Male     Birth control/protection: Coitus interruptus, Other-see comments     Comment: tubal     Social Determinants of Health     Financial Resource Strain: Low Risk  (5/10/2023)    Overall Financial Resource Strain (CARDIA)     Difficulty of Paying Living Expenses: Not hard at all   Food Insecurity: No Food Insecurity (5/10/2023)    Hunger Vital Sign     Worried About Running Out of Food in the Last Year: Never true     Ran Out of Food in the Last Year: Never true   Transportation Needs: No Transportation Needs (5/10/2023)    PRAPARE - Transportation     Lack of Transportation (Medical): No     Lack of Transportation (Non-Medical): No   Physical Activity: Insufficiently Active (5/10/2023)    Exercise Vital Sign     Days of Exercise per Week: 2 days     Minutes of Exercise per Session: 30 min   Stress: Stress Concern Present (5/10/2023)    [place] [employer] - Occupational Stress Questionnaire     Feeling of Stress : To some extent   Social Connections: Unknown (5/10/2023)    Social Connection and Isolation Panel [NHANES]     Frequency of Communication with Friends and Family: More than three times a week     Frequency of Social Gatherings with Friends and Family: More than three times a week     Active Member of Clubs or Organizations: Yes     Attends Club or Organization Meetings: More than 4 times per year     Marital Status:    Housing Stability: Low Risk  (5/10/2023)    Housing Stability Vital Sign     Unable to Pay for Housing in the Last Year: No     Number of  Places Lived in the Last Year: 1     Unstable Housing in the Last Year: No     Medication List with Changes/Refills   Current Medications    ACETAMINOPHEN (TYLENOL) 500 MG TABLET    Take 500 mg by mouth every 6 (six) hours as needed.    ALBUTEROL (PROVENTIL/VENTOLIN HFA) 90 MCG/ACTUATION INHALER    Inhale 2 puffs into the lungs every 6 (six) hours as needed for Wheezing or Shortness of Breath.    AMLODIPINE (NORVASC) 5 MG TABLET    Take 1 tablet (5 mg total) by mouth once daily.    AMOXICILLIN (AMOXIL) 875 MG TABLET    Take 875 mg by mouth 2 (two) times daily.    AZELASTINE (ASTELIN) 137 MCG (0.1 %) NASAL SPRAY    1 spray (137 mcg total) by Nasal route 2 (two) times daily.    CELECOXIB (CELEBREX) 200 MG CAPSULE    TAKE 1 CAPSULE BY MOUTH ONCE DAILY    DEXBROMPHENIRAMINE-PHENYLEPH (ALAHIST PE) 2-7.5 MG TAB    Take 1 tablet by mouth every 4 to 6 hours as needed (congestion).    DICLOFENAC SODIUM (VOLTAREN) 1 % GEL    Apply 2 g topically 4 (four) times daily.    FLUOXETINE 10 MG CAPSULE    Take 1 capsule (10 mg total) by mouth once daily.    MECLIZINE (ANTIVERT) 12.5 MG TABLET    Take 1 tablet (12.5 mg total) by mouth 3 (three) times daily as needed for Dizziness.    METOPROLOL SUCCINATE (TOPROL-XL) 25 MG 24 HR TABLET    Take 1 tablet (25 mg total) by mouth once daily.    TEMAZEPAM (RESTORIL) 15 MG CAP    TAKE 1 CAPSULE BY MOUTH NIGHTLY AS NEEDED FOR SLEEP.     Review of patient's allergies indicates:   Allergen Reactions    Adhesive Swelling     Swelling x2 with flu shot and band-aid.  Swells in shape of band-aid.    Lisinopril Other (See Comments)     Cough    Latex, natural rubber Swelling     Localized swelling at site of bandaid/adhesive       Physical Exam:   Body mass index is 41.6 kg/m².    GENERAL: Well appearing, in no acute distress.  HEAD: Normocephalic and atraumatic.  ENT: External ears and nose grossly normal.  EYES: EOMI bilaterally  PULMONARY: Respirations are grossly even and non-labored.  NEURO:  Awake, alert, and oriented x 3.  SKIN: No obvious rashes appreciated.  PSYCH: Mood & affect are appropriate.    Detailed MSK exam:     Left knee exam:   -ROM: extension 0, flexion 120  -TTP: Medial joint line and Lateral joint line  -effusion: trace  -Patellar apprehension negative  -Hunter test negative  -stable to varus and valgus stress tests  -Lachman test negative, anterior drawer test negative, posterior drawer test negative     Right knee exam:   -ROM: extension 0, flexion 120  -TTP: Medial joint line and Lateral joint line  -effusion: trace  -Patellar apprehension negative  -Hunter test negative  -stable to varus and valgus stress tests  -Lachman test negative, anterior drawer test negative, posterior drawer test negative    Imaging:  X-ray Knee Ortho Bilateral  Narrative: EXAMINATION:  XR KNEE ORTHO BILAT    CLINICAL HISTORY:  Bilateral primary osteoarthritis of knee    TECHNIQUE:  AP standing, lateral and Merchant views of both knees were performed.    COMPARISON:  07/29/2021    FINDINGS:  Severe medial compartment joint space narrowing bilaterally with severe osteophytic change.  Moderate osteophytic change lateral compartments with compensatory widening.  Slight genu varus bilaterally.  Patellofemoral joints demonstrate periarticular osteophytic hypertrophy bilaterally.  Soft tissues normal.  Impression: Degenerative change as above.  Medial compartment joint space narrowing slightly progressed compared to the prior.    Electronically signed by: Dee Pacheco MD  Date:    04/03/2023  Time:    09:39        Relevant imaging results were reviewed and interpreted by me and per my read shows medial compartment joint space narrowing bilaterally.  This was discussed with the patient and / or family today.     Assessment:  Mary Russo is a 53 y.o. female following up for chronic bilateral knee pain. Gel injections ineffective. Steroid injections last a little over 2 months and is interested in  repeating today.   Plan: Steroid injection given today (see separate procedure note for details). We discussed the proper protocols after the injection such as no submerging pools, baths tubs, or hot tubs for 24 hr.  Showering is okay today.  We also discussed that blood sugars can be elevated after an injection and asked patient to properly checked her sugars over the next few days and contact their PCP if there are any concerns.  We discussed red flags such as fevers, chills, red, warm, tender joint at the area of injection to please seek medical care immediately.   Consider iovera procedure if not improving. Continue conservative management for pain.   Follow up as needed. All questions answered.     Primary osteoarthritis of both knees  -     Large Joint Aspiration/Injection: bilateral knee         Time was spent discussing the cryoanalgesia procedure Iovera with the patient.  Risks and benefits were also discussed with patient.  X-rays were reviewed to use as a diagram to explain procedure. A detailed description of landmarks and placement of anesthetic used during procedure were also explained to patient.  Contraindications for procedure were discussed with patient.    More than 50% of the total time was spent face to face for counseling on diagnosis and treatment options. I also counseled patient  on common and most usual side effect of prescribed medications.  I reviewed Primary care, and other specialty's notes to better coordinate patient's care. Patient voiced understanding.        Electronically signed:  Skip Rich MD, MPH  09/27/2023  3:38 PM

## 2023-09-27 NOTE — PATIENT INSTRUCTIONS
Assessment:  Mary Russo is a 53 y.o. female   Chief Complaint   Patient presents with    Left Knee - Pain    Right Knee - Pain       No diagnosis found.     Plan:  Bilateral Cortizone steroid injections given in both knees today.  We discussed the proper protocols after the injection such as no submerging pools, baths tubs, or hot tubs for 24 hr.  Showering is okay today.  We also discussed that blood sugars can be elevated after an injection and asked patient to properly checked her sugars over the next few days and contact their PCP if there are any concerns.  We discussed red flags such as fevers, chills, red, warm, tender joint at the area of injection to please seek medical care immediately.      .                              Follow-up: PRN or sooner if there are any problems between now and then.    Thank you for choosing [facility] and Dr. Skip Rich for your orthopedic & sports medicine care. It is our goal to provide you with exceptional care that will help keep you healthy, active, and get you back in the game.    Please do not hesitate to reach out to us via email, phone, or MyChart with any questions, concerns, or feedback.    If you felt that you received exemplary care today, please consider leaving us feedback on [facility] [employer] at:  [email or web address]    If you are experiencing pain/discomfort ,or have questions after 5pm and would like to be connected to the [facility] on-call team, please call this number and specify which Sports Medicine provider is treating you: [phone]

## 2023-09-27 NOTE — PROCEDURES
Large Joint Aspiration/Injection: bilateral knee    Date/Time: 9/27/2023 3:00 PM    Performed by: Skip Rich MD  Authorized by: Skip Rich MD    Consent Done?:  Yes (Verbal)  Indications:  Arthritis and pain  Site marked: the procedure site was marked    Timeout: prior to procedure the correct patient, procedure, and site was verified    Prep: patient was prepped and draped in usual sterile fashion    Local anesthetic:  Bupivacaine 0.5% without epinephrine and lidocaine 1% without epinephrine    Details:  Needle Size:  22 G  Approach:  Anterolateral  Location:  Knee  Laterality:  Bilateral  Site:  Bilateral knee  Medications (Right):  40 mg triamcinolone acetonide 40 mg/mL  Medications (Left):  40 mg triamcinolone acetonide 40 mg/mL  Patient tolerance:  Patient tolerated the procedure well with no immediate complications

## 2023-09-29 RX ORDER — CELECOXIB 200 MG/1
CAPSULE ORAL
Qty: 30 CAPSULE | Refills: 0 | Status: SHIPPED | OUTPATIENT
Start: 2023-09-29 | End: 2023-11-03

## 2023-10-06 ENCOUNTER — PATIENT MESSAGE (OUTPATIENT)
Dept: SPORTS MEDICINE | Facility: CLINIC | Age: 53
End: 2023-10-06
Payer: COMMERCIAL

## 2023-10-20 LAB — HEMOCCULT STL QL IA: NEGATIVE

## 2023-11-03 RX ORDER — CELECOXIB 200 MG/1
200 CAPSULE ORAL
Qty: 30 CAPSULE | Refills: 0 | Status: SHIPPED | OUTPATIENT
Start: 2023-11-03 | End: 2024-01-19

## 2023-11-16 ENCOUNTER — OFFICE VISIT (OUTPATIENT)
Dept: INTERNAL MEDICINE | Facility: CLINIC | Age: 53
End: 2023-11-16
Payer: COMMERCIAL

## 2023-11-16 VITALS
RESPIRATION RATE: 18 BRPM | TEMPERATURE: 97 F | OXYGEN SATURATION: 98 % | DIASTOLIC BLOOD PRESSURE: 84 MMHG | HEIGHT: 66 IN | BODY MASS INDEX: 41.49 KG/M2 | WEIGHT: 258.19 LBS | SYSTOLIC BLOOD PRESSURE: 124 MMHG | HEART RATE: 84 BPM

## 2023-11-16 DIAGNOSIS — I10 ESSENTIAL HYPERTENSION: Primary | ICD-10-CM

## 2023-11-16 DIAGNOSIS — F41.9 ANXIETY AND DEPRESSION: ICD-10-CM

## 2023-11-16 DIAGNOSIS — E55.9 VITAMIN D DEFICIENCY: ICD-10-CM

## 2023-11-16 DIAGNOSIS — Z23 NEED FOR VACCINATION: ICD-10-CM

## 2023-11-16 DIAGNOSIS — E66.01 MORBID OBESITY WITH BMI OF 40.0-44.9, ADULT: ICD-10-CM

## 2023-11-16 DIAGNOSIS — Z90.3 S/P GASTRIC SLEEVE PROCEDURE: ICD-10-CM

## 2023-11-16 DIAGNOSIS — F32.A ANXIETY AND DEPRESSION: ICD-10-CM

## 2023-11-16 PROCEDURE — 99999 PR PBB SHADOW E&M-EST. PATIENT-LVL V: CPT | Mod: PBBFAC,,, | Performed by: FAMILY MEDICINE

## 2023-11-16 PROCEDURE — 90471 IMMUNIZATION ADMIN: CPT | Mod: S$GLB,,, | Performed by: FAMILY MEDICINE

## 2023-11-16 PROCEDURE — 3074F SYST BP LT 130 MM HG: CPT | Mod: CPTII,S$GLB,, | Performed by: FAMILY MEDICINE

## 2023-11-16 PROCEDURE — 3044F HG A1C LEVEL LT 7.0%: CPT | Mod: CPTII,S$GLB,, | Performed by: FAMILY MEDICINE

## 2023-11-16 PROCEDURE — 1159F PR MEDICATION LIST DOCUMENTED IN MEDICAL RECORD: ICD-10-PCS | Mod: CPTII,S$GLB,, | Performed by: FAMILY MEDICINE

## 2023-11-16 PROCEDURE — 3079F DIAST BP 80-89 MM HG: CPT | Mod: CPTII,S$GLB,, | Performed by: FAMILY MEDICINE

## 2023-11-16 PROCEDURE — 1159F MED LIST DOCD IN RCRD: CPT | Mod: CPTII,S$GLB,, | Performed by: FAMILY MEDICINE

## 2023-11-16 PROCEDURE — 90686 IIV4 VACC NO PRSV 0.5 ML IM: CPT | Mod: S$GLB,,, | Performed by: FAMILY MEDICINE

## 2023-11-16 PROCEDURE — 99999 PR PBB SHADOW E&M-EST. PATIENT-LVL V: ICD-10-PCS | Mod: PBBFAC,,, | Performed by: FAMILY MEDICINE

## 2023-11-16 PROCEDURE — 3008F PR BODY MASS INDEX (BMI) DOCUMENTED: ICD-10-PCS | Mod: CPTII,S$GLB,, | Performed by: FAMILY MEDICINE

## 2023-11-16 PROCEDURE — 90471 FLU VACCINE (QUAD) GREATER THAN OR EQUAL TO 3YO PRESERVATIVE FREE IM: ICD-10-PCS | Mod: S$GLB,,, | Performed by: FAMILY MEDICINE

## 2023-11-16 PROCEDURE — 99214 PR OFFICE/OUTPT VISIT, EST, LEVL IV, 30-39 MIN: ICD-10-PCS | Mod: 25,S$GLB,, | Performed by: FAMILY MEDICINE

## 2023-11-16 PROCEDURE — 3044F PR MOST RECENT HEMOGLOBIN A1C LEVEL <7.0%: ICD-10-PCS | Mod: CPTII,S$GLB,, | Performed by: FAMILY MEDICINE

## 2023-11-16 PROCEDURE — 99214 OFFICE O/P EST MOD 30 MIN: CPT | Mod: 25,S$GLB,, | Performed by: FAMILY MEDICINE

## 2023-11-16 PROCEDURE — 3008F BODY MASS INDEX DOCD: CPT | Mod: CPTII,S$GLB,, | Performed by: FAMILY MEDICINE

## 2023-11-16 PROCEDURE — 3079F PR MOST RECENT DIASTOLIC BLOOD PRESSURE 80-89 MM HG: ICD-10-PCS | Mod: CPTII,S$GLB,, | Performed by: FAMILY MEDICINE

## 2023-11-16 PROCEDURE — 90686 FLU VACCINE (QUAD) GREATER THAN OR EQUAL TO 3YO PRESERVATIVE FREE IM: ICD-10-PCS | Mod: S$GLB,,, | Performed by: FAMILY MEDICINE

## 2023-11-16 PROCEDURE — 3074F PR MOST RECENT SYSTOLIC BLOOD PRESSURE < 130 MM HG: ICD-10-PCS | Mod: CPTII,S$GLB,, | Performed by: FAMILY MEDICINE

## 2023-11-16 NOTE — ASSESSMENT & PLAN NOTE
Body mass index is 41.67 kg/m².    Wt Readings from Last 10 Encounters:   11/16/23 117.1 kg (258 lb 2.5 oz)   09/27/23 116.9 kg (257 lb 11.5 oz)   09/07/23 116.9 kg (257 lb 11.5 oz)   07/20/23 115.2 kg (254 lb)   05/10/23 115.5 kg (254 lb 11.9 oz)   04/03/23 115.7 kg (255 lb)   03/21/23 115.8 kg (255 lb 4.7 oz)   03/14/23 115.8 kg (255 lb 4.7 oz)   01/23/23 115.8 kg (255 lb 3.2 oz)   01/03/23 117.6 kg (259 lb 4.2 oz)

## 2023-11-16 NOTE — PROGRESS NOTES
Subjective:       Patient ID: Mary Russo is a 53 y.o. female.    Chief Complaint: Follow-up    Patient presents to clinic today for followup of chronic conditions.    Review of Systems   Constitutional:  Negative for activity change and unexpected weight change.   HENT:  Negative for hearing loss, rhinorrhea and trouble swallowing.    Eyes:  Negative for discharge and visual disturbance.   Respiratory:  Negative for chest tightness and wheezing.    Cardiovascular:  Negative for chest pain and palpitations.   Gastrointestinal:  Negative for blood in stool, constipation, diarrhea and vomiting.   Endocrine: Negative for polydipsia and polyuria.   Genitourinary:  Negative for difficulty urinating, dysuria, hematuria and menstrual problem.   Musculoskeletal:  Negative for arthralgias, joint swelling and neck pain.   Neurological:  Negative for weakness and headaches.   Psychiatric/Behavioral:  Negative for confusion and dysphoric mood.        Objective:      Physical Exam  Vitals reviewed.   Constitutional:       General: She is not in acute distress.     Appearance: She is well-developed.   HENT:      Head: Normocephalic and atraumatic.   Eyes:      General: Lids are normal. No scleral icterus.     Extraocular Movements: Extraocular movements intact.      Conjunctiva/sclera: Conjunctivae normal.      Pupils: Pupils are equal, round, and reactive to light.   Pulmonary:      Effort: Pulmonary effort is normal.   Neurological:      Mental Status: She is alert and oriented to person, place, and time.      Cranial Nerves: No cranial nerve deficit.      Gait: Gait normal.   Psychiatric:         Mood and Affect: Mood and affect normal.         Assessment:       1. Essential hypertension    2. Anxiety and depression    3. Vitamin D deficiency    4. Morbid obesity with BMI of 40.0-44.9, adult    5. S/P gastric sleeve procedure    6. Need for vaccination        Plan:   1. Essential hypertension  Assessment &  Plan:  Reports BPs running lower and occasionally feeling dizzy; reports has not been taking both BP medications regularly; will discontinue metoprolol, continue amlodipine and monitor; if continuing to run low will give trial off amlodipine as well; patient expressed understanding and agreement with plan    Orders:  -     Comprehensive Metabolic Panel; Future; Expected date: 11/16/2023    2. Anxiety and depression  Assessment & Plan:  Stable on prozac      3. Vitamin D deficiency  Assessment & Plan:  Status pending lab    Orders:  -     Vitamin D; Future; Expected date: 11/16/2023    4. Morbid obesity with BMI of 40.0-44.9, adult  Assessment & Plan:  Body mass index is 41.67 kg/m².    Wt Readings from Last 10 Encounters:   11/16/23 117.1 kg (258 lb 2.5 oz)   09/27/23 116.9 kg (257 lb 11.5 oz)   09/07/23 116.9 kg (257 lb 11.5 oz)   07/20/23 115.2 kg (254 lb)   05/10/23 115.5 kg (254 lb 11.9 oz)   04/03/23 115.7 kg (255 lb)   03/21/23 115.8 kg (255 lb 4.7 oz)   03/14/23 115.8 kg (255 lb 4.7 oz)   01/23/23 115.8 kg (255 lb 3.2 oz)   01/03/23 117.6 kg (259 lb 4.2 oz)           5. S/P gastric sleeve procedure  Overview:  Dr. London, January 2022      6. Need for vaccination  -     Influenza - Quadrivalent (PF)        Health Maintenance reviewed/updated.

## 2023-11-16 NOTE — ASSESSMENT & PLAN NOTE
Reports BPs running lower and occasionally feeling dizzy; reports has not been taking both BP medications regularly; will discontinue metoprolol, continue amlodipine and monitor; if continuing to run low will give trial off amlodipine as well; patient expressed understanding and agreement with plan

## 2023-12-21 ENCOUNTER — PATIENT MESSAGE (OUTPATIENT)
Dept: INTERNAL MEDICINE | Facility: CLINIC | Age: 53
End: 2023-12-21
Payer: COMMERCIAL

## 2023-12-21 DIAGNOSIS — Z12.39 BREAST SCREENING: Primary | ICD-10-CM

## 2023-12-28 LAB
25(OH)D3+25(OH)D2 SERPL-MCNC: 22.1 NG/ML (ref 30–100)
ALBUMIN SERPL-MCNC: 3.8 G/DL (ref 3.8–4.9)
ALBUMIN/GLOB SERPL: 1.2 {RATIO} (ref 1.2–2.2)
ALP SERPL-CCNC: 69 IU/L (ref 44–121)
ALT SERPL-CCNC: 6 IU/L (ref 0–32)
AST SERPL-CCNC: 10 IU/L (ref 0–40)
BILIRUB SERPL-MCNC: 0.3 MG/DL (ref 0–1.2)
BUN SERPL-MCNC: 13 MG/DL (ref 6–24)
BUN/CREAT SERPL: 16 (ref 9–23)
CALCIUM SERPL-MCNC: 10 MG/DL (ref 8.7–10.2)
CHLORIDE SERPL-SCNC: 105 MMOL/L (ref 96–106)
CO2 SERPL-SCNC: 25 MMOL/L (ref 20–29)
CREAT SERPL-MCNC: 0.82 MG/DL (ref 0.57–1)
EST. GFR  (NO RACE VARIABLE): 87 ML/MIN/1.73
GLOBULIN SER CALC-MCNC: 3.2 G/DL (ref 1.5–4.5)
GLUCOSE SERPL-MCNC: 102 MG/DL (ref 70–99)
POTASSIUM SERPL-SCNC: 4.1 MMOL/L (ref 3.5–5.2)
PROT SERPL-MCNC: 7 G/DL (ref 6–8.5)
SODIUM SERPL-SCNC: 144 MMOL/L (ref 134–144)

## 2024-01-10 ENCOUNTER — OFFICE VISIT (OUTPATIENT)
Dept: SPORTS MEDICINE | Facility: CLINIC | Age: 54
End: 2024-01-10
Payer: COMMERCIAL

## 2024-01-10 VITALS
HEART RATE: 89 BPM | BODY MASS INDEX: 41.46 KG/M2 | HEIGHT: 66 IN | DIASTOLIC BLOOD PRESSURE: 69 MMHG | WEIGHT: 258 LBS | SYSTOLIC BLOOD PRESSURE: 110 MMHG

## 2024-01-10 DIAGNOSIS — M17.0 PRIMARY OSTEOARTHRITIS OF BOTH KNEES: Primary | ICD-10-CM

## 2024-01-10 PROCEDURE — 1160F RVW MEDS BY RX/DR IN RCRD: CPT | Mod: CPTII,S$GLB,, | Performed by: STUDENT IN AN ORGANIZED HEALTH CARE EDUCATION/TRAINING PROGRAM

## 2024-01-10 PROCEDURE — 99214 OFFICE O/P EST MOD 30 MIN: CPT | Mod: 25,S$GLB,, | Performed by: STUDENT IN AN ORGANIZED HEALTH CARE EDUCATION/TRAINING PROGRAM

## 2024-01-10 PROCEDURE — 1159F MED LIST DOCD IN RCRD: CPT | Mod: CPTII,S$GLB,, | Performed by: STUDENT IN AN ORGANIZED HEALTH CARE EDUCATION/TRAINING PROGRAM

## 2024-01-10 PROCEDURE — 3078F DIAST BP <80 MM HG: CPT | Mod: CPTII,S$GLB,, | Performed by: STUDENT IN AN ORGANIZED HEALTH CARE EDUCATION/TRAINING PROGRAM

## 2024-01-10 PROCEDURE — 3008F BODY MASS INDEX DOCD: CPT | Mod: CPTII,S$GLB,, | Performed by: STUDENT IN AN ORGANIZED HEALTH CARE EDUCATION/TRAINING PROGRAM

## 2024-01-10 PROCEDURE — 3074F SYST BP LT 130 MM HG: CPT | Mod: CPTII,S$GLB,, | Performed by: STUDENT IN AN ORGANIZED HEALTH CARE EDUCATION/TRAINING PROGRAM

## 2024-01-10 PROCEDURE — 99999 PR PBB SHADOW E&M-EST. PATIENT-LVL III: CPT | Mod: PBBFAC,,, | Performed by: STUDENT IN AN ORGANIZED HEALTH CARE EDUCATION/TRAINING PROGRAM

## 2024-01-10 PROCEDURE — 20611 DRAIN/INJ JOINT/BURSA W/US: CPT | Mod: 50,S$GLB,, | Performed by: STUDENT IN AN ORGANIZED HEALTH CARE EDUCATION/TRAINING PROGRAM

## 2024-01-10 RX ADMIN — TRIAMCINOLONE ACETONIDE 40 MG: 40 INJECTION, SUSPENSION INTRA-ARTICULAR; INTRAMUSCULAR at 03:01

## 2024-01-10 NOTE — PROCEDURES
Large Joint Aspiration/Injection: bilateral knee    Date/Time: 1/10/2024 3:20 PM    Performed by: Skip Rich MD  Authorized by: Skip Rich MD    Consent Done?:  Yes (Verbal)  Indications:  Arthritis and pain  Site marked: the procedure site was marked    Timeout: prior to procedure the correct patient, procedure, and site was verified    Prep: patient was prepped and draped in usual sterile fashion    Local anesthetic:  Bupivacaine 0.5% without epinephrine and lidocaine 1% without epinephrine    Details:  Needle Size:  22 G  Ultrasonic Guidance for needle placement?: Yes    Images are saved and documented.  Approach:  Lateral (superior)  Location:  Knee  Laterality:  Bilateral  Site:  Bilateral knee  Medications (Right):  40 mg triamcinolone acetonide 40 mg/mL  Medications (Left):  40 mg triamcinolone acetonide 40 mg/mL  Patient tolerance:  Patient tolerated the procedure well with no immediate complications     Ultrasound guidance was used for needle localization. Images were saved and stored for documentation. The appropriate structures were visualized. Dynamic visualization of the needle was continuous throughout the procedures and maintained good position.

## 2024-01-10 NOTE — PROGRESS NOTES
Patient ID: Mary Russo  YOB: 1970  MRN: [id number]    Chief Complaint: right knee followup        History of Present Illness: Mary Russo is a right-hand dominant 53 y.o. female who presents for follow up s/p  bilateral knee pain. Right knee CSI 9/27/2023, worked well She reports right knee popping for the last week. Water aerobics does help, she has not been to the gym in a couple of days but she does intend to start going to water aerobic soon.  Sitting to standing her pain is 7/10,  She is here today for CSI in both knee.     The patient is active in none.  Occupation: Human resources      Past Medical History:   Past Medical History:   Diagnosis Date    Acid reflux     Anxiety     Arthritis     Carpal tunnel syndrome of right wrist     Cholelithiases     Hypertension     Iron deficiency     Morbid obesity      Past Surgical History:   Procedure Laterality Date    CHOLECYSTECTOMY      ESOPHAGOGASTRODUODENOSCOPY N/A 10/14/2019    Procedure: ESOPHAGOGASTRODUODENOSCOPY (EGD);  Surgeon: Stefanie Monique MD;  Location: [facility];  Service: Endoscopy;  Laterality: N/A;    ESOPHAGOGASTRODUODENOSCOPY N/A 12/23/2019    Procedure: EGD (ESOPHAGOGASTRODUODENOSCOPY);  Surgeon: Stefanie Monique MD;  Location: [facility];  Service: Endoscopy;  Laterality: N/A;    EYE SURGERY  2/2018    Lasik    HERNIA REPAIR      LAPAROSCOPIC GASTRIC BANDING  10/31/2017    removed    REFRACTIVE SURGERY      TUBAL LIGATION       Family History   Problem Relation Age of Onset    Cancer Mother 49        breast cancer    Heart disease Mother     Diabetes Mother     Stroke Mother     Obesity Mother     Breast cancer Mother     Hypertension Mother     Kidney disease Mother     Heart disease Father     Obesity Father     Obesity Sister     Obesity Maternal Grandmother     Obesity Sister     Colon cancer Neg Hx     Ovarian cancer Neg Hx      Social History     Socioeconomic History    Marital status:      Number of children: 3   Occupational History     Employer: [employer]   Tobacco Use    Smoking status: Never    Smokeless tobacco: Never   Substance and Sexual Activity    Alcohol use: Not Currently     Alcohol/week: 1.0 standard drink of alcohol     Types: 1 Glasses of wine per week     Comment: occasionally    Drug use: Never    Sexual activity: Yes     Partners: Male     Birth control/protection: Coitus interruptus, Other-see comments     Comment: tubal     Social Determinants of Health     Financial Resource Strain: Low Risk  (11/13/2023)    Overall Financial Resource Strain (CARDIA)     Difficulty of Paying Living Expenses: Not hard at all   Food Insecurity: No Food Insecurity (11/13/2023)    Hunger Vital Sign     Worried About Running Out of Food in the Last Year: Never true     Ran Out of Food in the Last Year: Never true   Transportation Needs: No Transportation Needs (11/13/2023)    PRAPARE - Transportation     Lack of Transportation (Medical): No     Lack of Transportation (Non-Medical): No   Physical Activity: Insufficiently Active (11/13/2023)    Exercise Vital Sign     Days of Exercise per Week: 2 days     Minutes of Exercise per Session: 50 min   Stress: No Stress Concern Present (11/13/2023)    [employer] - Occupational Stress Questionnaire     Feeling of Stress : Only a little   Social Connections: Unknown (11/13/2023)    Social Connection and Isolation Panel [NHANES]     Frequency of Communication with Friends and Family: More than three times a week     Frequency of Social Gatherings with Friends and Family: Twice a week     Active Member of Clubs or Organizations: Yes     Attends Club or Organization Meetings: More than 4 times per year     Marital Status:    Housing Stability: Low Risk  (11/13/2023)    Housing Stability Vital Sign     Unable to Pay for Housing in the Last Year: No     Number of Places Lived in the  Last Year: 1     Unstable Housing in the Last Year: No     Medication List with Changes/Refills   Current Medications    ACETAMINOPHEN (TYLENOL) 500 MG TABLET    Take 500 mg by mouth every 6 (six) hours as needed.    ALBUTEROL (PROVENTIL/VENTOLIN HFA) 90 MCG/ACTUATION INHALER    Inhale 2 puffs into the lungs every 6 (six) hours as needed for Wheezing or Shortness of Breath.    AMLODIPINE (NORVASC) 5 MG TABLET    Take 1 tablet (5 mg total) by mouth once daily.    CELECOXIB (CELEBREX) 200 MG CAPSULE    TAKE 1 CAPSULE BY MOUTH EVERY DAY    DICLOFENAC SODIUM (VOLTAREN) 1 % GEL    Apply 2 g topically 4 (four) times daily.    FLUOXETINE 10 MG CAPSULE    Take 1 capsule (10 mg total) by mouth once daily.     Review of patient's allergies indicates:   Allergen Reactions    Adhesive Swelling     Swelling x2 with flu shot and band-aid.  Swells in shape of band-aid.    Lisinopril Other (See Comments)     Cough    Latex, natural rubber Swelling     Localized swelling at site of bandaid/adhesive       Physical Exam:   There is no height or weight on file to calculate BMI.    GENERAL: Well appearing, in no acute distress.  HEAD: Normocephalic and atraumatic.  ENT: External ears and nose grossly normal.  EYES: EOMI bilaterally  PULMONARY: Respirations are grossly even and non-labored.  NEURO: Awake, alert, and oriented x 3.  SKIN: No obvious rashes appreciated.  PSYCH: Mood & affect are appropriate.    Detailed MSK exam:     Right knee exam:   -ROM: extension 0, flexion 110  -TTP: Medial joint line and Lateral joint line  -effusion: trace  -Patellar apprehension negative  -Hunter test negative  -stable to varus and valgus stress tests  -Lachman test negative, anterior drawer test negative, posterior drawer test negative    Left knee exam:   -ROM: extension 0, flexion 110  -TTP: Medial joint line and Lateral joint line  -effusion: trace  -Patellar apprehension negative  -Hunter test negative  -stable to varus and valgus stress  tests  -Lachman test negative, anterior drawer test negative, posterior drawer test negative      Imaging:  X-ray Knee Ortho Bilateral  Narrative: EXAMINATION:  XR KNEE ORTHO BILAT    CLINICAL HISTORY:  Bilateral primary osteoarthritis of knee    TECHNIQUE:  AP standing, lateral and Merchant views of both knees were performed.    COMPARISON:  07/29/2021    FINDINGS:  Severe medial compartment joint space narrowing bilaterally with severe osteophytic change.  Moderate osteophytic change lateral compartments with compensatory widening.  Slight genu varus bilaterally.  Patellofemoral joints demonstrate periarticular osteophytic hypertrophy bilaterally.  Soft tissues normal.  Impression: Degenerative change as above.  Medial compartment joint space narrowing slightly progressed compared to the prior.    Electronically signed by: Dee Pacheco MD  Date:    04/03/2023  Time:    09:39        Relevant imaging results were reviewed and interpreted by me and per my read shows severe arthritic changes bilaterally.  This was discussed with the patient and / or family today.     Assessment:  Mary Russo is a 53 y.o. female following up for chronic bilateral knee pain. Steroid injection lasts around 3 months and is interested in repeating today.   Plan: Steroid injection given today (see separate procedure note for details). We discussed the proper protocols after the injection such as no submerging pools, baths tubs, or hot tubs for 24 hr.  Showering is okay today.  We also discussed that blood sugars can be elevated after an injection and asked patient to properly checked her sugars over the next few days and contact their PCP if there are any concerns.  We discussed red flags such as fevers, chills, red, warm, tender joint at the area of injection to please seek medical care immediately.   Consider iovera if not improving. Gel injection was ineffective in the past. PT referral at the [facility] for aquatic therapy.    Follow up as needed. All questions answered.     Primary osteoarthritis of both knees  -     Sports Medicine US - Guidance for Needle Placement  -     Large Joint Aspiration/Injection: bilateral knee         Ultrasound guidance was used for needle localization. Images were saved and stored for documentation. The appropriate structures were visualized. Dynamic visualization of the needle was continuous throughout the procedures and maintained good position.      Electronically signed:  Skip Rich MD, MPH  01/10/2024  3:32 PM

## 2024-01-10 NOTE — PATIENT INSTRUCTIONS
Assessment:  Mary Russo is a 53 y.o. female No chief complaint on file.      Encounter Diagnosis   Name Primary?    Primary osteoarthritis of both knees Yes        Plan:  Bilateral ultrasound guide cortisone injection to knees  We discussed the proper protocols after the injection such as no submerging pools, baths tubs, or hot tubs for 24 hr.  Showering is okay today.  We also discussed that blood sugars can be elevated after an injection and asked patient to properly checked her sugars over the next few days and contact their PCP if there are any concerns.  We discussed red flags such as fevers, chills, red, warm, tender joint at the area of injection to please seek medical care immediately.    Apply topical diclofenac (Voltaren) up to 4 times a day to the affected area.  It can be bought over the counter at any local pharmacy.    Patient may ice every 2 hours for 15 minutes as needed to control pain and swelling.   Follow up as needed          Follow-up: as needed.    Thank you for choosing [facility] and Dr. Skip Rich for your orthopedic & sports medicine care. It is our goal to provide you with exceptional care that will help keep you healthy, active, and get you back in the game.    Please do not hesitate to reach out to us via email, phone, or MyChart with any questions, concerns, or feedback.    If you felt that you received exemplary care today, please consider leaving us feedback on [employer] at:  [email or web address]    If you are experiencing pain/discomfort ,or have questions after 5pm and would like to be connected to the [facility] on-call team, please call this number and specify which Sports Medicine provider is treating you: [phone]

## 2024-01-10 NOTE — PROCEDURES
Sports Medicine US - Guidance for Needle Placement    Date/Time: 1/10/2024 3:20 PM    Performed by: Skip Rich MD  Authorized by: Skip Rich MD  Preparation: Patient was prepped and draped in the usual sterile fashion.  Local anesthesia used: no    Anesthesia:  Local anesthesia used: no    Sedation:  Patient sedated: no    Patient tolerance: patient tolerated the procedure well with no immediate complications  Comments: Ultrasound guidance was used for needle localization. Images were saved and stored for documentation. The appropriate structures were visualized. Dynamic visualization of the needle was continuous throughout the procedures and maintained good position.

## 2024-01-10 NOTE — LETTER
January 10, 2024      [facility]  [id number] [place] [id number]  Phone: [phone]  Fax: [phone]       Patient: Mary Russo   YOB: 1970  Date of Visit: 01/10/2024    To Whom It May Concern:    Jolanta Russo  was at [facility] on 01/10/2024. The patient may return to work on 1/17/2023 . If you have any questions or concerns, or if I can be of further assistance, please do not hesitate to contact me.    Sincerely,    Skip Rich MD/ FORREST Granados

## 2024-01-12 DIAGNOSIS — E55.9 VITAMIN D DEFICIENCY: Primary | ICD-10-CM

## 2024-01-12 RX ORDER — TRIAMCINOLONE ACETONIDE 40 MG/ML
40 INJECTION, SUSPENSION INTRA-ARTICULAR; INTRAMUSCULAR
Status: DISCONTINUED | OUTPATIENT
Start: 2024-01-10 | End: 2024-01-12 | Stop reason: HOSPADM

## 2024-01-12 RX ORDER — GLUCOSAMINE SULFATE 1500 MG
1000 POWDER IN PACKET (EA) ORAL DAILY
Refills: 0 | COMMUNITY
Start: 2024-01-12

## 2024-01-19 ENCOUNTER — HOSPITAL ENCOUNTER (OUTPATIENT)
Dept: RADIOLOGY | Facility: HOSPITAL | Age: 54
Discharge: HOME | End: 2024-01-19
Attending: NURSE PRACTITIONER
Payer: COMMERCIAL

## 2024-01-19 ENCOUNTER — OFFICE VISIT (OUTPATIENT)
Dept: OBSTETRICS AND GYNECOLOGY | Facility: CLINIC | Age: 54
End: 2024-01-19
Payer: COMMERCIAL

## 2024-01-19 VITALS
SYSTOLIC BLOOD PRESSURE: 120 MMHG | BODY MASS INDEX: 41.27 KG/M2 | HEIGHT: 66 IN | WEIGHT: 256.81 LBS | DIASTOLIC BLOOD PRESSURE: 78 MMHG

## 2024-01-19 DIAGNOSIS — Z12.39 BREAST SCREENING: ICD-10-CM

## 2024-01-19 DIAGNOSIS — Z01.419 ROUTINE GYNECOLOGICAL EXAMINATION: ICD-10-CM

## 2024-01-19 DIAGNOSIS — Z78.0 MENOPAUSE: Primary | ICD-10-CM

## 2024-01-19 PROCEDURE — 77063 BREAST TOMOSYNTHESIS BI: CPT | Mod: 26,,, | Performed by: RADIOLOGY

## 2024-01-19 PROCEDURE — 3074F SYST BP LT 130 MM HG: CPT | Mod: CPTII,S$GLB,, | Performed by: NURSE PRACTITIONER

## 2024-01-19 PROCEDURE — 1159F MED LIST DOCD IN RCRD: CPT | Mod: CPTII,S$GLB,, | Performed by: NURSE PRACTITIONER

## 2024-01-19 PROCEDURE — 1160F RVW MEDS BY RX/DR IN RCRD: CPT | Mod: CPTII,S$GLB,, | Performed by: NURSE PRACTITIONER

## 2024-01-19 PROCEDURE — 3078F DIAST BP <80 MM HG: CPT | Mod: CPTII,S$GLB,, | Performed by: NURSE PRACTITIONER

## 2024-01-19 PROCEDURE — 77067 SCR MAMMO BI INCL CAD: CPT | Mod: 26,,, | Performed by: RADIOLOGY

## 2024-01-19 PROCEDURE — 77067 SCR MAMMO BI INCL CAD: CPT | Mod: TC

## 2024-01-19 PROCEDURE — 99999 PR PBB SHADOW E&M-EST. PATIENT-LVL III: CPT | Mod: PBBFAC,,, | Performed by: NURSE PRACTITIONER

## 2024-01-19 PROCEDURE — 99396 PREV VISIT EST AGE 40-64: CPT | Mod: S$GLB,,, | Performed by: NURSE PRACTITIONER

## 2024-01-19 PROCEDURE — 3008F BODY MASS INDEX DOCD: CPT | Mod: CPTII,S$GLB,, | Performed by: NURSE PRACTITIONER

## 2024-01-19 NOTE — PROGRESS NOTES
"  Subjective:       Patient ID: Mary Russo is a 53 y.o. female.    Chief Complaint:  Annual Exam      History of Present Illness  HPI  Complains of hot flashes LMP was in September   Health Maintenance   Topic Date Due    Shingles Vaccine (1 of 2) Never done    Mammogram  2024    Colorectal Cancer Screening  10/09/2024    TETANUS VACCINE  2026    Lipid Panel  2028    Hepatitis C Screening  Completed     GYN & OB History  No LMP recorded. Patient is premenopausal.   Date of Last Pap:     OB History    Para Term  AB Living   5 3 3   2 3   SAB IAB Ectopic Multiple Live Births                  # Outcome Date GA Lbr Paul/2nd Weight Sex Delivery Anes PTL Lv   5 AB            4 AB            3 Term            2 Term            1 Term                Review of Systems  Review of Systems        Objective:   /78   Ht 5' 6" (1.676 m)   Wt 116.5 kg (256 lb 13.4 oz)   BMI 41.45 kg/m²    Physical Exam:   Constitutional: She is oriented to person, place, and time. She appears well-developed and well-nourished.        Pulmonary/Chest: Right breast exhibits no inverted nipple, no mass, no nipple discharge, no skin change, no tenderness and no swelling. Left breast exhibits no inverted nipple, no mass, no nipple discharge, no skin change, no tenderness and no swelling.        Abdominal: Soft.     Genitourinary:    Inguinal canal and vagina normal.      Pelvic exam was performed with patient supine.   The external female genitalia was normal.   Genitalia hair distrobution normal .     Labial bartholins normal.No erythema, vaginal discharge, bleeding, rectocele, cystocele or prolapse of vaginal walls in the vagina.    No foreign body in the vagina.      No signs of injury in the vagina.                 Neurological: She is alert and oriented to person, place, and time.    Skin: Skin is warm and dry.    Psychiatric: She has a normal mood and affect. Her behavior is normal. " Judgment and thought content normal.      Assessment:        1. Menopause    2. Routine gynecological examination                Plan:            Mary was seen today for annual exam.    Diagnoses and all orders for this visit:    Menopause  -     FOLLICLE STIMULATING HORMONE; Future    Routine gynecological examination    IF client is postmenopausal, would like to start HRT  If she is not postmenopausal, recommend see PCP for other sources of hot flashes

## 2024-01-22 ENCOUNTER — TELEPHONE (OUTPATIENT)
Dept: OBSTETRICS AND GYNECOLOGY | Facility: CLINIC | Age: 54
End: 2024-01-22
Payer: COMMERCIAL

## 2024-01-22 NOTE — TELEPHONE ENCOUNTER
----- Message from Juana Rose NP sent at 1/21/2024  8:07 PM CST -----  Bloodwork indicates client is postmenopausal   Please schedule a virtual visit to initiate HRT as treatment for hot flashes

## 2024-01-26 ENCOUNTER — OFFICE VISIT (OUTPATIENT)
Dept: OBSTETRICS AND GYNECOLOGY | Facility: CLINIC | Age: 54
End: 2024-01-26
Payer: COMMERCIAL

## 2024-01-26 DIAGNOSIS — N95.1 VASOMOTOR SYMPTOMS DUE TO MENOPAUSE: Primary | ICD-10-CM

## 2024-01-26 PROCEDURE — 1160F RVW MEDS BY RX/DR IN RCRD: CPT | Mod: CPTII,95,, | Performed by: NURSE PRACTITIONER

## 2024-01-26 PROCEDURE — 99213 OFFICE O/P EST LOW 20 MIN: CPT | Mod: 95,,, | Performed by: NURSE PRACTITIONER

## 2024-01-26 PROCEDURE — 1159F MED LIST DOCD IN RCRD: CPT | Mod: CPTII,95,, | Performed by: NURSE PRACTITIONER

## 2024-01-26 RX ORDER — CLONIDINE HYDROCHLORIDE 0.1 MG/1
0.1 TABLET ORAL DAILY
Qty: 30 TABLET | Refills: 2 | Status: SHIPPED | OUTPATIENT
Start: 2024-01-26 | End: 2024-04-25

## 2024-01-26 NOTE — PROGRESS NOTES
The patient location is: [facility]  The chief complaint leading to consultation is: Hot flashes     Visit type: audiovisual    Face to Face time with patient: 15 minutes   20 minutes of total time spent on the encounter, which includes face to face time and non-face to face time preparing to see the patient (eg, review of tests), Obtaining and/or reviewing separately obtained history, Documenting clinical information in the electronic or other health record, Independently interpreting results (not separately reported) and communicating results to the patient/family/caregiver, or Care coordination (not separately reported).         Each patient to whom he or she provides medical services by telemedicine is:  (1) informed of the relationship between the physician and patient and the respective role of any other health care provider with respect to management of the patient; and (2) notified that he or she may decline to receive medical services by telemedicine and may withdraw from such care at any time.    Notes:   Subjective:       Patient ID: Mary Russo is a 53 y.o. female.    Chief Complaint:  No chief complaint on file.    LMP:  Postmenopausal   History of Present Illness  FSH results indicate that client is postmenopausal   Desires to initiate hormone replacement therapy if not contraindicated     OB History    Para Term  AB Living   5 3 3   2 3   SAB IAB Ectopic Multiple Live Births                  # Outcome Date GA Lbr Paul/2nd Weight Sex Delivery Anes PTL Lv   5 AB            4 AB            3 Term            2 Term            1 Term                Review of Systems  Review of Systems        Objective:    Physical Exam      Assessment:     1. Vasomotor symptoms due to menopause              Plan:   Diagnoses and all orders for this visit:    Vasomotor symptoms due to menopause  -     cloNIDine (CATAPRES) 0.1 MG tablet; Take 1 tablet (0.1 mg total) by mouth once daily.    Denies unexplained  vaginal bleeding,liver dysfunction or disease;active or history of DVT or PE;known blood clotting disorder or thrombophilia: untreated hypertension; histor of breast, endometrial or other estrogen dependent tumor;known hypersensitivity to hormone replacement therapy, or history of CHD, stroke or TIA.  Women with one or more first degree relatives with breast cancer or otherwise increased risk of breast cancer.   Unable to initiate HRT due to mother has history of breast cancer   Will start on clonidine. Patient to follow up in 3 months

## 2024-01-31 ENCOUNTER — PATIENT MESSAGE (OUTPATIENT)
Dept: OBSTETRICS AND GYNECOLOGY | Facility: CLINIC | Age: 54
End: 2024-01-31
Payer: COMMERCIAL

## 2024-01-31 DIAGNOSIS — N89.8 VAGINAL ITCHING: Primary | ICD-10-CM

## 2024-01-31 RX ORDER — FLUCONAZOLE 150 MG/1
150 TABLET ORAL ONCE
Qty: 1 TABLET | Refills: 0 | Status: SHIPPED | OUTPATIENT
Start: 2024-01-31 | End: 2024-01-31

## 2024-02-06 ENCOUNTER — CLINICAL SUPPORT (OUTPATIENT)
Dept: REHABILITATION | Facility: HOSPITAL | Age: 54
End: 2024-02-06
Attending: STUDENT IN AN ORGANIZED HEALTH CARE EDUCATION/TRAINING PROGRAM
Payer: COMMERCIAL

## 2024-02-06 DIAGNOSIS — Z74.09 DECREASED FUNCTIONAL MOBILITY AND ENDURANCE: ICD-10-CM

## 2024-02-06 DIAGNOSIS — R52 PAIN: Primary | ICD-10-CM

## 2024-02-06 DIAGNOSIS — R26.89 BALANCE PROBLEM: ICD-10-CM

## 2024-02-06 DIAGNOSIS — M17.0 PRIMARY OSTEOARTHRITIS OF BOTH KNEES: ICD-10-CM

## 2024-02-06 PROBLEM — R53.1 WEAKNESS: Status: RESOLVED | Noted: 2022-05-20 | Resolved: 2024-02-06

## 2024-02-06 PROCEDURE — 97110 THERAPEUTIC EXERCISES: CPT

## 2024-02-06 PROCEDURE — 97162 PT EVAL MOD COMPLEX 30 MIN: CPT

## 2024-02-06 NOTE — PLAN OF CARE
[facility]   Physical Therapy Initial Evaluation      Date: 2/6/2024   Name: Mary Ozuna Russo  Clinic Number: [id number]    Therapy Diagnosis:    Encounter Diagnoses   Name Primary?    Primary osteoarthritis of both knees     Pain Yes    Decreased functional mobility and endurance     Balance problem       Physician: Skip Rich MD     Physician Orders: PT Eval and Treat  Medical Diagnosis from Referral: Primary osteoarthritis of both knees  Evaluation Date: 2/6/2024  Authorization Period Expiration: 1/9/2025  Plan of Care Expiration: 4/6/2024  Progress Note Due: 3/6/2024  Visit # / Visits authorized: 1/1  FOTO: 1/3 (last performed on 2/6/2024)    Precautions: Standard and HTN    Time In: 8:38 am  Time Out: 9:34 am  Total Billable Time (timed & untimed codes): 55 minutes    SUBJECTIVE   Date of onset: progressive worsening    History of current condition - Mary reports that she has had long term knee pain - for about 7 years. Gradual progression of knee pain and needs TKA but trying to put off. Currently having injections into knees - tried gel injections about 6-7 months ago but did not feel that they helped. Now having cortisone injections.  AT end of the year was doing water aerobics at [facility] - and did feel that was helpful but they had an issue with heater and no instructor - wanting to continue at [facility] but times not convienient. Goals are to get independent in water program that she can do on her own.      Current Activity Level: at gym 1x per week bike/sitting elliptical (nustep) 3-5 miles, seated weights - leg press, upper body exercises     Falls: [x] No  [] Yes    Imaging: [x] Xray [] MRI [] CT:   Impression: 4/3/23 FINDINGS: Severe medial compartment joint space narrowing bilaterally with severe osteophytic change.  Moderate osteophytic change lateral compartments with compensatory widening.  Slight genu varus bilaterally.  Patellofemoral joints demonstrate  periarticular osteophytic hypertrophy bilaterally.  Soft tissues normal.    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  Prior Level of Function: walking ~ 20 min, standing about 20 min - ~ 1 year ago  Current Level of Function:  walking ~ pain after about 10 min, standing 10 min increased    Pain:  Current 0/10, worst 8/10, best 0/10   Location: bilateral knees   Description: Aching, Burning, Throbbing, Tight, stiff (gets stiff in front of knees after standing), and Sharp  Aggravating Factors: standing/walking, steps  Easing Factors: heating pad    Patients goals: Get stronger and learn exercises program for pool.    Medical History:   Past Medical History:   Diagnosis Date    Acid reflux     Anxiety     Arthritis     Carpal tunnel syndrome of right wrist     Cholelithiases     Hypertension     Iron deficiency     Morbid obesity        Surgical History:   Mary Russo  has a past surgical history that includes Tubal ligation; Hernia repair; Cholecystectomy; Laparoscopic gastric banding (10/31/2017); Refractive surgery; Esophagogastroduodenoscopy (N/A, 10/14/2019); Esophagogastroduodenoscopy (N/A, 12/23/2019); and Eye surgery (2/2018).    Medications:   Mary has a current medication list which includes the following prescription(s): acetaminophen, albuterol, amlodipine, cholecalciferol (vitamin d3), clonidine, diclofenac sodium, and fluoxetine.    Allergies:   Review of patient's allergies indicates:   Allergen Reactions    Adhesive Swelling     Swelling x2 with flu shot and band-aid.  Swells in shape of band-aid.    Lisinopril Other (See Comments)     Cough    Latex, natural rubber Swelling     Localized swelling at site of bandaid/adhesive        OBJECTIVE     Posture:  Patient presents with postural abnormalities which include:    [x] Forward Head   [x] Increased Lumbar Lordosis   [x] Rounded  Shoulder   [] Flat Back Posture   [] Increased Thoracic Kyphosis [] Pes Planus   [] Increased Trunk Sway  [] Valgus knee position   [] Increased Trunk Rotation  [] Varus knee position   [] Increased cervical lordosis [] Other:    Sensation:    Sensation to light touch over UE's is  [] Intact [] Impaired [x] Defer  Sensation to light touch over LE's is  [x] Intact [] Impaired [] Defer    Reflexes:  Patellar   [x] Defer [] Normal [] Hyper []  Hypo   Achilles  [x] Defer [] Normal [] Hyper []  Hypo  Tricep  [x] Defer [] Normal [] Hyper []  Hypo  Brachioradialis[x] Defer [] Normal [] Hyper []  Hypo      Gait Analysis: The patient ambulated with the following assistive device: none; the patient presents with the following gait abnormalities: unsteady gait, decreased step length, antalgic gait, and + trunk shift and hip drop     Balance  Right   (seconds) Left  (seconds) Norms   Single Leg Stance 2 2 Less than 4.9 sec high risk  5-6.4 sec increased risk  6.5 or greater low risk             Functional Tests  Outcome Norms   Five Time Sit to Stand 15.16 sec 60s: <11.4 sec  70s: <12.6 sec  80s: 14.8 sec  Greater than 14 sec high risk  12.1-14 sec increased risk  12 sec or less low risk        Range of Motion:    Knee AROM/PROM Right Left Pain/Dysfunction with Movement   Knee Flexion (135º) sitting/supine 117/125 110/120    Knee Extension (0º) sitting 0 0          Strength:    L/E MMT Right  (spine) Left Pain/Dysfunction with Movement   Hip Flexion  3+/5 3+/5    Knee flexion 4+/5 4+/5    Knee extension 4+/5 4+/5    Hip IR 4/5 4/5    Hip ER 4/5 4/5    Ankle DF 5/5 5/5    Ankle PF 5/5 5/5        Muscle Length:   Defer     Joint Mobility:   Defer     Special Tests:  Defer     Palpation:  Defer       FOTO:    Intake Outcome Measure for FOTO Knee Survey    Therapist reviewed FOTO scores for Mary Russo on 2/6/2024.   FOTO documents entered into [employer] - see Media section or FOTO account episode details..    Intake Score:  see below           TREATMENT     Total Treatment time (time-based codes) separate from Evaluation: (10) minutes     Mary received the treatments listed below:   Mary received therapeutic exercises to develop strength, endurance, ROM, flexibility, and core stabilization for 10 minutes including:    Intervention 2/6/2024  Parameters   HEP x                                        Plan for Next Visit: Initiate Aquatic therapy        PATIENT EDUCATION AND HOME EXERCISES     Education provided: (during treatment session) minutes  Home exercise program, and importance of regular activity    Written Home Exercises Provided: yes.  Exercises were reviewed and Mary was able to demonstrate them prior to the end of the session.  Mary demonstrated good  understanding of the education provided. See EMR under Patient Instructions for exercises provided during therapy sessions.    ASSESSMENT     Mary is a 53 y.o. female referred to outpatient Physical Therapy with a medical diagnosis of Primary osteoarthritis of both knees . The patient presents with signs and symptoms consistent with diagnosis along with impairments which include weakness, impaired endurance, impaired balance, decreased lower extremity function, and pain.      Patient prognosis is Good, if patient is consistent and compliant with Physical Therapy and home exercise program.  Patient will benefit from skilled outpatient Physical Therapy to address the deficits stated above and in the chart below, provide patient/family education, and to maximize patient's level of independence.     Plan of care discussed with patient: Yes  Patient's spiritual, cultural and educational needs considered and patient is agreeable to the plan of care and goals as stated below:     Anticipated Barriers for therapy: co-morbidities, chronicity of condition, lack of support system, and coping style      Medical Necessity is demonstrated by the following    History  Co-morbidities and personal factors that may impact the plan of care [] LOW: no personal factors / co-morbidities  [x] MODERATE: 1-2 personal factors / co-morbidities  [] HIGH: 3+ personal factors / co-morbidities    Moderate / High Support Documentation:   Past Medical History:   Diagnosis Date    Acid reflux     Anxiety     Arthritis     Carpal tunnel syndrome of right wrist     Cholelithiases     Hypertension     Iron deficiency     Morbid obesity          Examination  Body Structures and Functions, activity limitations and participation restrictions that may impact the plan of care [] LOW: addressing 1-2 elements  [x] MODERATE: 3+ elements  [] HIGH: 4+ elements (please support below)    Moderate / High Support Documentation: See evaluation / objective measurements above and FOTO.     Clinical Presentation [] LOW: stable  [x] MODERATE: Evolving  [] HIGH: Unstable     Decision Making/ Complexity Score: moderate         Goals:  Reviewed: 2/6/2024      Short Term Goals: In 4 weeks  Progress Date   1.Patient to be educated on HEP. [x] Progressing  [] MET   [] Not MET   [] Not tested    2.Patient to increase knee flexion range of motion, in order to improve available range of motion for ADL's.  [x] Progressing  [] MET   [] Not MET  [] Not tested    3.Patient to increase bilateral LE strength by 1/2 grade, in order to improve endurance and increase ability to perform all functional activities for increased time.  [] Progressing  [] MET   [] Not MET  [] Not tested    4.Patient to have pain less than 5/10 at worst, to improve QOL. [x] Progressing  [] MET   [] Not MET  [] Not tested    5.Patient to improve score on the FOTO, to improve QOL. [x] Progressing  [] MET   [] Not MET  [] Not tested    6. Patient to improve score on 5 times sit to  order to decrease fall risk. [x] Progressing  [] MET   [] Not MET  [] Not tested      Long Term Goals: In 8 weeks Progress Date   1.Patient to improve score on the  FOTO predicted score or better, to improve QOL. [x] Progressing  [] MET   [] Not MET  [] Not tested    2. Patient to increase bilateral LE strength to 4+/5 or greater, in order to improve endurance and increase ability to perform all functional activities for increased time. [x] Progressing  [] MET   [] Not MET  [] Not tested    3. Patient to have decreased pain to 2/10 at worst, to improve QOL. [x] Progressing  [] MET   [] Not MET  [] Not tested    4. Patient to normalize score on 5 times sit to stand, in order to improve endurance and decrease fall risk. [x] Progressing  [] MET   [] Not MET  [] Not tested    5. Patient to perform daily activities including walking greater then 10 min without increased symptoms. [x] Progressing  [] MET   [] Not MET  [] Not tested      PLAN     Plan of care Certification: 2/6/2024  to 4/6/2024.    Outpatient Physical Therapy 2 times weekly for 8 weeks to include any combination of the following interventions: Aquatic Therapy, Gait Training, Manual Therapy, Neuromuscular Re-ed, Patient Education/Self Care, Therapeutic Activites, Therapeutic Exercise, and Moist Hot Pack/Cold Pack    Dulce Maria Terrazas, PT

## 2024-02-08 ENCOUNTER — CLINICAL SUPPORT (OUTPATIENT)
Dept: REHABILITATION | Facility: HOSPITAL | Age: 54
End: 2024-02-08
Payer: COMMERCIAL

## 2024-02-08 DIAGNOSIS — Z74.09 DECREASED FUNCTIONAL MOBILITY AND ENDURANCE: ICD-10-CM

## 2024-02-08 DIAGNOSIS — R26.89 BALANCE PROBLEM: ICD-10-CM

## 2024-02-08 DIAGNOSIS — R52 PAIN: Primary | ICD-10-CM

## 2024-02-08 PROCEDURE — 97113 AQUATIC THERAPY/EXERCISES: CPT | Performed by: PHYSICAL THERAPIST

## 2024-02-08 NOTE — PROGRESS NOTES
[facility]   Physical Therapy Treatment Note        Name: Mary Ozuna [place]  Clinic Number: [id number]    Therapy Diagnosis:   Encounter Diagnoses   Name Primary?    Pain Yes    Decreased functional mobility and endurance     Balance problem      Physician: Skip Rich MD    Visit Date: 2/8/2024    Physician Orders: PT Eval and Treat  Medical Diagnosis from Referral: Primary osteoarthritis of both knees  Evaluation Date: 2/6/2024  Authorization Period Expiration: 1/9/2025  Plan of Care Expiration: 4/6/2024  Progress Note Due: 3/6/2024  Visit # / Visits authorized: 1/1  FOTO: 1/3 (last performed on 2/6/2024)     Precautions: Standard and HTN  PTA Visit #: 0/5     Time In: 3:12 pm   Time Out: 3:54 pm   Total Billable Time: 42 minutes (Billing reflects 1 on 1 treatment time spent with patient)    Subjective     Patient reports: she is having some pain in the front of her knee.    He/She was compliant with home exercise program.  Response to previous treatment: good   Functional change: no change at this time, first treatment.     Pain: 6-7/10     Location: front of the right knee     Objective      Objective Measures updated at progress report or POC update only unless otherwise noted.       Treatment     Mary received the treatments listed below:     Mary received aquatic therapy with the use of water to decrease the pull of gravity and weightbearing forces on the body to increase tolerance to resisted therex for 42 minutes including the following exercise as well as stepping in and out of the pool with use of Bilateral upper extremity on handrail and step-to pattern:     Exercise Performed Today  2/8/2024   Treadmill, forward, - mph x 4 minutes total with backwards   Treadmill, side-stepping, - mph x 4 minutes total alternating   Treadmill, backwards, - mph  - minutes   Marching, alternating x 3 minutes   Hamstring curls, alternating  x 3 minutes   Hip 3-way, B lower  "extremity x 2 minutes each         Bicycle forward/backward x 2 minutes each    Flutter kick x 2 minutes    Scissor kicks x 2 minutes    Hip flexor Stretch on treadmill  - minutes    Quad stretch on step   -   Hamstring stretch on step   2 x 30"    Gastroc stretch  at wall  2 x 30"         Heel/toe raises   - minutes    1/4 squat  x 2 minutes    Standing hip circles clockwise/counter clockwise  - minutes each    Standing figure 8   10x each    Hip march with external rotation /internal rotation (can opener)  x 10x Bilateral Lower extremity     UE alt flexion - paddles     UE alt abduction - paddles     UE alt hugs - paddles     UE internal rotation/external rotation - paddles     UE alt rows - paddles               x = exercise details same as prior session        Patient Education and Home Exercises       Home Exercises Provided and Patient Education Provided     Education provided: (with treatment above) minutes  PURPOSE: Patient educated on the impairments noted above and the effects of physical therapy intervention to improve overall condition and QOL.   EXERCISE: Patient was educated on all the above exercise prior/during/after for proper posture, positioning, and execution for safe performance with home exercise program.   STRENGTH: Patient educated on the importance of improved core and extremity strength in order to improve alignment of the spine and extremities with static positions and dynamic movement.   GAIT & BALANCE: Patient educated on the importance of strong core and lower extremity musculature in order to improve both static and dynamic balance, improve gait mechanics, reduce fall risk and improve household and community mobility.   POSTURE: Patient educated on postural awareness to reduce stress and maintain optimal alignment of the spine with static positions and dynamic movement     Written Home Exercises Provided: yes.  Exercises were reviewed and Mary was able to demonstrate them prior to " the end of the session.  Mary demonstrated good  understanding of the education provided. See EMR under Patient Instructions for exercises provided during therapy sessions.    Assessment     Patient tolerated first session well with increased cues for form and technique with proper range and form for exercises.  Patient with some effort to stay in proper form when using the noodle but increasing core activation helped with this.     Mary is progressing well towards her goals.   Patient prognosis is Good.     Patient will continue to benefit from skilled outpatient physical therapy to address the deficits listed in the problem list box on initial evaluation, provide pt/family education and to maximize patient's level of independence in the home and community environment.     Patient's spiritual, cultural and educational needs considered and pt agreeable to plan of care and goals.      Anticipated Barriers for therapy: co-morbidities, chronicity of condition, lack of support system, and coping style     Goals:  Reviewed: 2/6/2024       Short Term Goals: In 4 weeks  Progress Date   1.Patient to be educated on HEP. [x] Progressing  [] MET   [] Not MET   [] Not tested     2.Patient to increase knee flexion range of motion, in order to improve available range of motion for ADL's.  [x] Progressing  [] MET   [] Not MET  [] Not tested     3.Patient to increase bilateral LE strength by 1/2 grade, in order to improve endurance and increase ability to perform all functional activities for increased time.  [] Progressing  [] MET   [] Not MET  [] Not tested     4.Patient to have pain less than 5/10 at worst, to improve QOL. [x] Progressing  [] MET   [] Not MET  [] Not tested     5.Patient to improve score on the FOTO, to improve QOL. [x] Progressing  [] MET   [] Not MET  [] Not tested     6. Patient to improve score on 5 times sit to  order to decrease fall risk. [x] Progressing  [] MET   [] Not MET  [] Not tested         Long Term Goals: In 8 weeks Progress Date   1.Patient to improve score on the FOTO predicted score or better, to improve QOL. [x] Progressing  [] MET   [] Not MET  [] Not tested     2. Patient to increase bilateral LE strength to 4+/5 or greater, in order to improve endurance and increase ability to perform all functional activities for increased time. [x] Progressing  [] MET   [] Not MET  [] Not tested     3. Patient to have decreased pain to 2/10 at worst, to improve QOL. [x] Progressing  [] MET   [] Not MET  [] Not tested     4. Patient to normalize score on 5 times sit to stand, in order to improve endurance and decrease fall risk. [x] Progressing  [] MET   [] Not MET  [] Not tested     5. Patient to perform daily activities including walking greater then 10 min without increased symptoms. [x] Progressing  [] MET   [] Not MET  [] Not tested           Plan     Continue Plan of Care (POC) and progress per patient tolerance. See treatment section for details on planned progressions next session.      Ella Adair, PT

## 2024-02-15 ENCOUNTER — CLINICAL SUPPORT (OUTPATIENT)
Dept: REHABILITATION | Facility: HOSPITAL | Age: 54
End: 2024-02-15
Attending: STUDENT IN AN ORGANIZED HEALTH CARE EDUCATION/TRAINING PROGRAM
Payer: COMMERCIAL

## 2024-02-15 DIAGNOSIS — R52 PAIN: Primary | ICD-10-CM

## 2024-02-15 DIAGNOSIS — Z74.09 DECREASED FUNCTIONAL MOBILITY AND ENDURANCE: ICD-10-CM

## 2024-02-15 DIAGNOSIS — R26.89 BALANCE PROBLEM: ICD-10-CM

## 2024-02-15 PROCEDURE — 97113 AQUATIC THERAPY/EXERCISES: CPT | Mod: CQ

## 2024-02-15 NOTE — PROGRESS NOTES
[facility]   Physical Therapy Treatment Note        Name: Mary Ozuna [place]  Clinic Number: [id number]    Therapy Diagnosis:   Encounter Diagnoses   Name Primary?    Pain Yes    Decreased functional mobility and endurance     Balance problem      Physician: Skip Rich MD    Visit Date: 2/15/2024    Physician Orders: PT Eval and Treat  Medical Diagnosis from Referral: Primary osteoarthritis of both knees  Evaluation Date: 2/6/2024  Authorization Period Expiration: 2/5/2025  Plan of Care Expiration: 4/6/2024  Progress Note Due: 3/6/2024  Visit # / Visits authorized: 2/20 + eval  FOTO: 1/3 (last performed on 2/6/2024)     Precautions: Standard and HTN  PTA Visit #: 1/5     Time In: 1:45 pm   Time Out: 2:30 pm   Total Billable Time: 43 minutes (Billing reflects 1 on 1 treatment time spent with patient)    Subjective     Patient reports: her shoulder is bothering her a little today, but overall she is feeling better.     He/She was compliant with home exercise program.  Response to previous treatment: good   Functional change: no change at this time, first treatment.     Pain: 4/10     Location: front of the right knee     Objective      Objective Measures updated at progress report or POC update only unless otherwise noted.       Treatment     Mary received the treatments listed below:     Mary received aquatic therapy with the use of water to decrease the pull of gravity and weightbearing forces on the body to increase tolerance to resisted therex for 43 minutes including the following exercise as well as stepping in and out of the pool with use of Bilateral upper extremity on handrail and step-to pattern:     Exercise Performed Today  2/15/2024   Treadmill, forward, - mph x 5 minutes total with backwards   Treadmill, side-stepping, - mph x 5 minutes total alternating   Treadmill, backwards, - mph  - minutes   Marching, alternating x 3 minutes   Hamstring curls, alternating  " x 3 minutes   Hip 3-way, B lower extremity x 3 minutes each         Bicycle forward/backward x 3 minutes each    Flutter kick x 3 minutes    Scissor kicks x 3 minutes    Hip flexor Stretch on treadmill  - minutes    Quad stretch on step   -   Hamstring stretch on step   2 x 30"    Gastroc stretch  at wall  2 x 30"         Heel/toe raises   - minutes    1/4 squat  x 3 minutes    Standing hip circles clockwise/counter clockwise  - minutes each    Standing figure 8   10x each    Hip march with external rotation /internal rotation (can opener)  x 10x Bilateral Lower extremity     UE alt flexion - paddles     UE alt abduction - paddles     UE alt hugs - paddles     UE internal rotation/external rotation - paddles     UE alt rows - paddles               x = exercise details same as prior session        Patient Education and Home Exercises       Home Exercises Provided and Patient Education Provided     Education provided: (with treatment above) minutes  PURPOSE: Patient educated on the impairments noted above and the effects of physical therapy intervention to improve overall condition and QOL.   EXERCISE: Patient was educated on all the above exercise prior/during/after for proper posture, positioning, and execution for safe performance with home exercise program.   STRENGTH: Patient educated on the importance of improved core and extremity strength in order to improve alignment of the spine and extremities with static positions and dynamic movement.   GAIT & BALANCE: Patient educated on the importance of strong core and lower extremity musculature in order to improve both static and dynamic balance, improve gait mechanics, reduce fall risk and improve household and community mobility.   POSTURE: Patient educated on postural awareness to reduce stress and maintain optimal alignment of the spine with static positions and dynamic movement     Written Home Exercises Provided: continue prior home exercise " program.  Exercises were reviewed and Mary was able to demonstrate them prior to the end of the session.  Mary demonstrated good  understanding of the education provided. See EMR under Patient Instructions for exercises provided during therapy sessions.    Assessment     Patient did well with session with no complaints of discomfort. Verbal cues necessary for decreased upper extremity use with noodle activities. Patient with some difficulty navigating stairs. Patient left session with good fatigue.     Mary is progressing well towards her goals.   Patient prognosis is Good.     Patient will continue to benefit from skilled outpatient physical therapy to address the deficits listed in the problem list box on initial evaluation, provide pt/family education and to maximize patient's level of independence in the home and community environment.     Patient's spiritual, cultural and educational needs considered and pt agreeable to plan of care and goals.      Anticipated Barriers for therapy: co-morbidities, chronicity of condition, lack of support system, and coping style     Goals:  Reviewed: 2/6/2024       Short Term Goals: In 4 weeks  Progress Date   1.Patient to be educated on HEP. [x] Progressing  [] MET   [] Not MET   [] Not tested     2.Patient to increase knee flexion range of motion, in order to improve available range of motion for ADL's.  [x] Progressing  [] MET   [] Not MET  [] Not tested     3.Patient to increase bilateral LE strength by 1/2 grade, in order to improve endurance and increase ability to perform all functional activities for increased time.  [] Progressing  [] MET   [] Not MET  [] Not tested     4.Patient to have pain less than 5/10 at worst, to improve QOL. [x] Progressing  [] MET   [] Not MET  [] Not tested     5.Patient to improve score on the FOTO, to improve QOL. [x] Progressing  [] MET   [] Not MET  [] Not tested     6. Patient to improve score on 5 times sit to  order  to decrease fall risk. [x] Progressing  [] MET   [] Not MET  [] Not tested        Long Term Goals: In 8 weeks Progress Date   1.Patient to improve score on the FOTO predicted score or better, to improve QOL. [x] Progressing  [] MET   [] Not MET  [] Not tested     2. Patient to increase bilateral LE strength to 4+/5 or greater, in order to improve endurance and increase ability to perform all functional activities for increased time. [x] Progressing  [] MET   [] Not MET  [] Not tested     3. Patient to have decreased pain to 2/10 at worst, to improve QOL. [x] Progressing  [] MET   [] Not MET  [] Not tested     4. Patient to normalize score on 5 times sit to stand, in order to improve endurance and decrease fall risk. [x] Progressing  [] MET   [] Not MET  [] Not tested     5. Patient to perform daily activities including walking greater then 10 min without increased symptoms. [x] Progressing  [] MET   [] Not MET  [] Not tested           Plan     Continue Plan of Care (POC) and progress per patient tolerance. See treatment section for details on planned progressions next session.      Nydia Grace, PTA

## 2024-02-20 ENCOUNTER — CLINICAL SUPPORT (OUTPATIENT)
Dept: REHABILITATION | Facility: HOSPITAL | Age: 54
End: 2024-02-20
Payer: COMMERCIAL

## 2024-02-20 DIAGNOSIS — R52 PAIN: Primary | ICD-10-CM

## 2024-02-20 DIAGNOSIS — Z74.09 DECREASED FUNCTIONAL MOBILITY AND ENDURANCE: ICD-10-CM

## 2024-02-20 DIAGNOSIS — R26.89 BALANCE PROBLEM: ICD-10-CM

## 2024-02-20 PROCEDURE — 97113 AQUATIC THERAPY/EXERCISES: CPT | Performed by: PHYSICAL THERAPIST

## 2024-02-20 NOTE — PROGRESS NOTES
[facility]   Physical Therapy Treatment Note        Name: Mary Ozuna [place]  Clinic Number: [id number]    Therapy Diagnosis:   Encounter Diagnoses   Name Primary?    Pain Yes    Decreased functional mobility and endurance     Balance problem      Physician: Skip Rich MD    Visit Date: 2/20/2024    Physician Orders: PT Eval and Treat  Medical Diagnosis from Referral: Primary osteoarthritis of both knees  Evaluation Date: 2/6/2024  Authorization Period Expiration: 2/5/2025  Plan of Care Expiration: 4/6/2024  Progress Note Due: 3/6/2024  Visit # / Visits authorized: 2/20 + eval  FOTO: 1/3 (last performed on 2/6/2024)     Precautions: Standard and HTN  PTA Visit #: 1/5     Time In: 3:49 pm   Time Out: 4:32 pm   Total Billable Time: 41 minutes (Billing reflects 1 on 1 treatment time spent with patient)    Subjective     Patient reports: she has had more stiffness today. She did go to the gym this weekend.  She used the rowing machine and did well with it.      He/She was compliant with home exercise program.  Response to previous treatment: good   Functional change: no change at this time, first treatment.     Pain: 6.5/10     Location: front of the right knee     Objective      Objective Measures updated at progress report or POC update only unless otherwise noted.       Treatment     Mary received the treatments listed below:     Mary received aquatic therapy with the use of water to decrease the pull of gravity and weightbearing forces on the body to increase tolerance to resisted therex for 41 minutes including the following exercise as well as stepping in and out of the pool with use of Bilateral upper extremity on handrail and step-to pattern:     Exercise Performed Today  2/20/2024   Treadmill, forward, - mph x 5 minutes total with backwards   Treadmill, side-stepping, - mph x 5 minutes total alternating   Treadmill, backwards, - mph  - minutes   Marching, alternating  "x 3 minutes   Hamstring curls, alternating  x 3 minutes   Hip 3-way, B lower extremity x 3 minutes each         Bicycle forward/backward x 3 minutes each    Flutter kick x 3 minutes    Scissor kicks x 3 minutes    Hip flexor Stretch on treadmill  - minutes    Quad stretch on step   -   Hamstring stretch on step   2 x 30"    Gastroc stretch  at wall  2 x 30"         Heel/toe raises   - minutes    1/4 squat  x 3 minutes    Standing hip circles clockwise/counter clockwise  - minutes each    Standing figure 8   10x each    Hip march with external rotation /internal rotation (can opener)  x 10x Bilateral Lower extremity     UE alt flexion - paddles     UE alt abduction - paddles     UE alt hugs - paddles     UE internal rotation/external rotation - paddles     UE alt rows - paddles               x = exercise details same as prior session      Patient Education and Home Exercises       Home Exercises Provided and Patient Education Provided     Education provided: (with treatment above) minutes  PURPOSE: Patient educated on the impairments noted above and the effects of physical therapy intervention to improve overall condition and QOL.   EXERCISE: Patient was educated on all the above exercise prior/during/after for proper posture, positioning, and execution for safe performance with home exercise program.   STRENGTH: Patient educated on the importance of improved core and extremity strength in order to improve alignment of the spine and extremities with static positions and dynamic movement.   GAIT & BALANCE: Patient educated on the importance of strong core and lower extremity musculature in order to improve both static and dynamic balance, improve gait mechanics, reduce fall risk and improve household and community mobility.   POSTURE: Patient educated on postural awareness to reduce stress and maintain optimal alignment of the spine with static positions and dynamic movement     Written Home Exercises Provided: " continue prior home exercise program.  Exercises were reviewed and Mary was able to demonstrate them prior to the end of the session.  Mary demonstrated good  understanding of the education provided. See EMR under Patient Instructions for exercises provided during therapy sessions.    Assessment     Patient tolerated session well today with no issues.  Cues for use of her hands with exercise in order to maintain correct posture.      Mary is progressing well towards her goals.   Patient prognosis is Good.     Patient will continue to benefit from skilled outpatient physical therapy to address the deficits listed in the problem list box on initial evaluation, provide pt/family education and to maximize patient's level of independence in the home and community environment.     Patient's spiritual, cultural and educational needs considered and pt agreeable to plan of care and goals.      Anticipated Barriers for therapy: co-morbidities, chronicity of condition, lack of support system, and coping style     Goals:  Reviewed: 2/6/2024       Short Term Goals: In 4 weeks  Progress Date   1.Patient to be educated on HEP. [x] Progressing  [] MET   [] Not MET   [] Not tested     2.Patient to increase knee flexion range of motion, in order to improve available range of motion for ADL's.  [x] Progressing  [] MET   [] Not MET  [] Not tested     3.Patient to increase bilateral LE strength by 1/2 grade, in order to improve endurance and increase ability to perform all functional activities for increased time.  [] Progressing  [] MET   [] Not MET  [] Not tested     4.Patient to have pain less than 5/10 at worst, to improve QOL. [x] Progressing  [] MET   [] Not MET  [] Not tested     5.Patient to improve score on the FOTO, to improve QOL. [x] Progressing  [] MET   [] Not MET  [] Not tested     6. Patient to improve score on 5 times sit to  order to decrease fall risk. [x] Progressing  [] MET   [] Not MET  [] Not  tested        Long Term Goals: In 8 weeks Progress Date   1.Patient to improve score on the FOTO predicted score or better, to improve QOL. [x] Progressing  [] MET   [] Not MET  [] Not tested     2. Patient to increase bilateral LE strength to 4+/5 or greater, in order to improve endurance and increase ability to perform all functional activities for increased time. [x] Progressing  [] MET   [] Not MET  [] Not tested     3. Patient to have decreased pain to 2/10 at worst, to improve QOL. [x] Progressing  [] MET   [] Not MET  [] Not tested     4. Patient to normalize score on 5 times sit to stand, in order to improve endurance and decrease fall risk. [x] Progressing  [] MET   [] Not MET  [] Not tested     5. Patient to perform daily activities including walking greater then 10 min without increased symptoms. [x] Progressing  [] MET   [] Not MET  [] Not tested           Plan     Continue Plan of Care (POC) and progress per patient tolerance. See treatment section for details on planned progressions next session.      Ella Adair, PT

## 2024-02-27 ENCOUNTER — CLINICAL SUPPORT (OUTPATIENT)
Dept: REHABILITATION | Facility: HOSPITAL | Age: 54
End: 2024-02-27
Attending: STUDENT IN AN ORGANIZED HEALTH CARE EDUCATION/TRAINING PROGRAM
Payer: COMMERCIAL

## 2024-02-27 DIAGNOSIS — Z74.09 DECREASED FUNCTIONAL MOBILITY AND ENDURANCE: ICD-10-CM

## 2024-02-27 DIAGNOSIS — R52 PAIN: Primary | ICD-10-CM

## 2024-02-27 DIAGNOSIS — R26.89 BALANCE PROBLEM: ICD-10-CM

## 2024-02-27 PROCEDURE — 97113 AQUATIC THERAPY/EXERCISES: CPT | Performed by: PHYSICAL THERAPIST

## 2024-02-27 NOTE — PATIENT INSTRUCTIONS
[facility]:  [address], [place] [address]   [phone]    [facility] Smalls   [address], [place] [address]    [phone]    [address] Petty  [address], [place] [address]   [phone]      [facility] [facility]   [address] [facility] [address]    [phone]    [employer] [facility]  [address], [place] [address]  [phone]      [facility] [facility]  [address], [place] [address]    [phone]    [address] [place] [address]  [phone]      [facility]  [address], [place] [address]   [phone]      [facility]  [address], [place] [address]  [phone]    [facility]  [address] [place] [address]  [phone]    [facility]  [address] [place] [address]  [phone]      [facility]  [address], [place] [address]  [phone]

## 2024-02-27 NOTE — PROGRESS NOTES
[facility]   Physical Therapy Treatment Note        Name: Mary Ozuna [place]  Clinic Number: [id number]    Therapy Diagnosis:   Encounter Diagnoses   Name Primary?    Pain Yes    Decreased functional mobility and endurance     Balance problem      Physician: Skip Rich MD    Visit Date: 2/27/2024    Physician Orders: PT Eval and Treat  Medical Diagnosis from Referral: Primary osteoarthritis of both knees  Evaluation Date: 2/6/2024  Authorization Period Expiration: 2/5/2025  Plan of Care Expiration: 4/6/2024  Progress Note Due: 3/6/2024  Visit # / Visits authorized: 2/20 + eval  FOTO: 1/3 (last performed on 2/6/2024)     Precautions: Standard and HTN  PTA Visit #: 1/5     Time In: 5:13 pm   Time Out: 6:00 pm   Total Billable Time: 47 minutes (Billing reflects 1 on 1 treatment time spent with patient)    Subjective     Patient reports: she has done the gym a few times since last session.  She didn't too a lot of walking today so isn't hurting as much as she was at last session.     He/She was compliant with home exercise program.  Response to previous treatment: good   Functional change: no change at this time, first treatment.     Pain: 4-4.5/10     Location: front of the right knee     Objective      Objective Measures updated at progress report or POC update only unless otherwise noted.       Treatment     Mary received the treatments listed below:     Mary received aquatic therapy with the use of water to decrease the pull of gravity and weightbearing forces on the body to increase tolerance to resisted therex for 47 minutes including the following exercise as well as stepping in and out of the pool with use of Bilateral upper extremity on handrail and step-to pattern:     Exercise Performed Today  2/27/2024   Treadmill, forward, - mph x 5 minutes total with backwards   Treadmill, side-stepping, - mph x 5 minutes total alternating   Treadmill, backwards, - mph  -  "minutes   Marching, alternating, no hands slower pace x 3 minutes   Hamstring curls, alternating, no hands x 3 minutes   Hip 3-way, B lower extremity x 3 minutes each         Bicycle forward/backward x 3 minutes each    Flutter kick x 3 minutes    Scissor kicks x 3 minutes    Hip flexor Stretch on treadmill  - minutes    Quad stretch on step   -   Hamstring stretch on step   2 x 30"    Gastroc stretch  at wall  2 x 30"         Heel/toe raises   - minutes    1/4 squat, second level  x 3 minutes    Standing hip circles clockwise/counter clockwise  - minutes each    Standing figure 8   10x each    Hip march with external rotation /internal rotation (can opener)  x 10x2 Bilateral Lower extremity     UE alt flexion - paddles     UE alt abduction - paddles     UE alt hugs - paddles     UE internal rotation/external rotation - paddles     UE alt rows - paddles               x = exercise details same as prior session      Patient Education and Home Exercises       Home Exercises Provided and Patient Education Provided     Education provided: (with treatment above) minutes  PURPOSE: Patient educated on the impairments noted above and the effects of physical therapy intervention to improve overall condition and QOL.   EXERCISE: Patient was educated on all the above exercise prior/during/after for proper posture, positioning, and execution for safe performance with home exercise program.   STRENGTH: Patient educated on the importance of improved core and extremity strength in order to improve alignment of the spine and extremities with static positions and dynamic movement.   GAIT & BALANCE: Patient educated on the importance of strong core and lower extremity musculature in order to improve both static and dynamic balance, improve gait mechanics, reduce fall risk and improve household and community mobility.   POSTURE: Patient educated on postural awareness to reduce stress and maintain optimal alignment of the spine with " static positions and dynamic movement     Written Home Exercises Provided: continue prior home exercise program.  Exercises were reviewed and Mary was able to demonstrate them prior to the end of the session.  Mary demonstrated good  understanding of the education provided. See EMR under Patient Instructions for exercises provided during therapy sessions.    Assessment     Patient tolerated exercises well today with decreased Bilateral upper extremity use.  She was able to also perform squats on the second level with minimal cues needed for form. Plan to increase resistance with use of weights next session.      Mary is progressing well towards her goals.   Patient prognosis is Good.     Patient will continue to benefit from skilled outpatient physical therapy to address the deficits listed in the problem list box on initial evaluation, provide pt/family education and to maximize patient's level of independence in the home and community environment.     Patient's spiritual, cultural and educational needs considered and pt agreeable to plan of care and goals.      Anticipated Barriers for therapy: co-morbidities, chronicity of condition, lack of support system, and coping style     Goals:  Reviewed: 2/6/2024       Short Term Goals: In 4 weeks  Progress Date   1.Patient to be educated on HEP. [x] Progressing  [] MET   [] Not MET   [] Not tested     2.Patient to increase knee flexion range of motion, in order to improve available range of motion for ADL's.  [x] Progressing  [] MET   [] Not MET  [] Not tested     3.Patient to increase bilateral LE strength by 1/2 grade, in order to improve endurance and increase ability to perform all functional activities for increased time.  [] Progressing  [] MET   [] Not MET  [] Not tested     4.Patient to have pain less than 5/10 at worst, to improve QOL. [x] Progressing  [] MET   [] Not MET  [] Not tested     5.Patient to improve score on the FOTO, to improve QOL. [x]  Progressing  [] MET   [] Not MET  [] Not tested     6. Patient to improve score on 5 times sit to  order to decrease fall risk. [x] Progressing  [] MET   [] Not MET  [] Not tested        Long Term Goals: In 8 weeks Progress Date   1.Patient to improve score on the FOTO predicted score or better, to improve QOL. [x] Progressing  [] MET   [] Not MET  [] Not tested     2. Patient to increase bilateral LE strength to 4+/5 or greater, in order to improve endurance and increase ability to perform all functional activities for increased time. [x] Progressing  [] MET   [] Not MET  [] Not tested     3. Patient to have decreased pain to 2/10 at worst, to improve QOL. [x] Progressing  [] MET   [] Not MET  [] Not tested     4. Patient to normalize score on 5 times sit to stand, in order to improve endurance and decrease fall risk. [x] Progressing  [] MET   [] Not MET  [] Not tested     5. Patient to perform daily activities including walking greater then 10 min without increased symptoms. [x] Progressing  [] MET   [] Not MET  [] Not tested           Plan     Continue Plan of Care (POC) and progress per patient tolerance. See treatment section for details on planned progressions next session.      Ella Adair, PT

## 2024-03-09 ENCOUNTER — PATIENT MESSAGE (OUTPATIENT)
Dept: INTERNAL MEDICINE | Facility: CLINIC | Age: 54
End: 2024-03-09
Payer: COMMERCIAL

## 2024-03-09 DIAGNOSIS — K21.9 GASTROESOPHAGEAL REFLUX DISEASE WITHOUT ESOPHAGITIS: ICD-10-CM

## 2024-03-12 NOTE — TELEPHONE ENCOUNTER
Pt requesting refills on metoprolol and Pantoprazole  Im seeing both meds as discontinued  Please advise

## 2024-03-20 RX ORDER — PANTOPRAZOLE SODIUM 40 MG/1
40 TABLET, DELAYED RELEASE ORAL DAILY
Qty: 90 TABLET | Refills: 1 | Status: SHIPPED | OUTPATIENT
Start: 2024-03-20

## 2024-03-20 NOTE — TELEPHONE ENCOUNTER
No care due was identified.  [facility] Embedded Care Due Messages. Reference number: [id number].   3/20/2024 1:43:38 PM CDT

## 2024-03-21 ENCOUNTER — OFFICE VISIT (OUTPATIENT)
Dept: SPORTS MEDICINE | Facility: CLINIC | Age: 54
End: 2024-03-21
Payer: COMMERCIAL

## 2024-03-21 VITALS — HEIGHT: 66 IN | WEIGHT: 256.81 LBS | BODY MASS INDEX: 41.27 KG/M2

## 2024-03-21 DIAGNOSIS — M25.562 CHRONIC PAIN OF LEFT KNEE: Primary | ICD-10-CM

## 2024-03-21 DIAGNOSIS — M25.561 CHRONIC PAIN OF RIGHT KNEE: ICD-10-CM

## 2024-03-21 DIAGNOSIS — G89.29 CHRONIC PAIN OF RIGHT KNEE: ICD-10-CM

## 2024-03-21 DIAGNOSIS — G89.29 CHRONIC PAIN OF LEFT KNEE: Primary | ICD-10-CM

## 2024-03-21 DIAGNOSIS — M17.0 BILATERAL PRIMARY OSTEOARTHRITIS OF KNEE: Primary | ICD-10-CM

## 2024-03-21 PROCEDURE — 3008F BODY MASS INDEX DOCD: CPT | Mod: CPTII,S$GLB,, | Performed by: STUDENT IN AN ORGANIZED HEALTH CARE EDUCATION/TRAINING PROGRAM

## 2024-03-21 PROCEDURE — 1159F MED LIST DOCD IN RCRD: CPT | Mod: CPTII,S$GLB,, | Performed by: STUDENT IN AN ORGANIZED HEALTH CARE EDUCATION/TRAINING PROGRAM

## 2024-03-21 PROCEDURE — 20611 DRAIN/INJ JOINT/BURSA W/US: CPT | Mod: 50,51,S$GLB, | Performed by: STUDENT IN AN ORGANIZED HEALTH CARE EDUCATION/TRAINING PROGRAM

## 2024-03-21 PROCEDURE — 99214 OFFICE O/P EST MOD 30 MIN: CPT | Mod: 25,S$GLB,, | Performed by: STUDENT IN AN ORGANIZED HEALTH CARE EDUCATION/TRAINING PROGRAM

## 2024-03-21 PROCEDURE — 99999 PR PBB SHADOW E&M-EST. PATIENT-LVL III: CPT | Mod: PBBFAC,,, | Performed by: STUDENT IN AN ORGANIZED HEALTH CARE EDUCATION/TRAINING PROGRAM

## 2024-03-21 PROCEDURE — 1160F RVW MEDS BY RX/DR IN RCRD: CPT | Mod: CPTII,S$GLB,, | Performed by: STUDENT IN AN ORGANIZED HEALTH CARE EDUCATION/TRAINING PROGRAM

## 2024-03-21 RX ORDER — TRIAMCINOLONE ACETONIDE 40 MG/ML
40 INJECTION, SUSPENSION INTRA-ARTICULAR; INTRAMUSCULAR
Status: DISCONTINUED | OUTPATIENT
Start: 2024-03-21 | End: 2024-03-21 | Stop reason: HOSPADM

## 2024-03-21 RX ADMIN — TRIAMCINOLONE ACETONIDE 40 MG: 40 INJECTION, SUSPENSION INTRA-ARTICULAR; INTRAMUSCULAR at 11:03

## 2024-03-21 NOTE — PROCEDURES
Sports Medicine US - Guidance for Needle Placement    Date/Time: 3/21/2024 11:40 AM    Performed by: Skip Rich MD  Authorized by: Skip Rich MD  Preparation: Patient was prepped and draped in the usual sterile fashion.  Local anesthesia used: no    Anesthesia:  Local anesthesia used: no    Sedation:  Patient sedated: no    Patient tolerance: patient tolerated the procedure well with no immediate complications  Comments: Ultrasound guidance was used for needle localization. Images were saved and stored for documentation. The appropriate structures were visualized. Dynamic visualization of the needle was continuous throughout the procedures and maintained good position.

## 2024-03-21 NOTE — PROCEDURES
Sports Medicine US - Extremity Non-Vascular LIMITED Left    Date/Time: 3/21/2024 11:40 AM    Performed by: Skip Rich MD  Authorized by: Skip Rich MD  Preparation: Patient was prepped and draped in the usual sterile fashion.  Local anesthesia used: no    Anesthesia:  Local anesthesia used: no    Sedation:  Patient sedated: no    Patient tolerance: patient tolerated the procedure well with no immediate complications  Comments: Limited ultrasound of the left knee was utilized to map out and visualize the superficial sensory nerves in proximal thigh and medial knee that were targeted for the iovera procedure. The superior and inferior branches of the infrapatellar saphenous nerve, the medial femoral cutaneous nerve, the anterior femoral cutaneous nerve, and the lateral femoral cutaneous nerve were all visualized. Images saved in the EMR for documentation.

## 2024-03-21 NOTE — PROCEDURES
Sports Medicine US - Extremity Non-Vascular LIMITED Right    Date/Time: 3/21/2024 11:40 AM    Performed by: Skip Rich MD  Authorized by: Skip Rich MD  Preparation: Patient was prepped and draped in the usual sterile fashion.  Local anesthesia used: no    Anesthesia:  Local anesthesia used: no    Sedation:  Patient sedated: no    Patient tolerance: patient tolerated the procedure well with no immediate complications  Comments: Limited ultrasound of the right knee was utilized to map out and visualize the superficial sensory nerves in proximal thigh and medial knee that were targeted for the iovera procedure. The superior and inferior branches of the infrapatellar saphenous nerve, the medial femoral cutaneous nerve, the anterior femoral cutaneous nerve, and the lateral femoral cutaneous nerve were all visualized. Images saved in the EMR for documentation.

## 2024-03-21 NOTE — PROCEDURES
Large Joint Aspiration/Injection: bilateral knee    Date/Time: 3/21/2024 11:40 AM    Performed by: Skip Rich MD  Authorized by: Skip Rich MD    Consent Done?:  Yes (Verbal)  Indications:  Arthritis and pain  Site marked: the procedure site was marked    Timeout: prior to procedure the correct patient, procedure, and site was verified    Prep: patient was prepped and draped in usual sterile fashion    Local anesthetic:  Bupivacaine 0.5% without epinephrine and lidocaine 1% without epinephrine    Details:  Needle Size:  21 G  Ultrasonic Guidance for needle placement?: Yes    Images are saved and documented.  Approach:  Lateral (superior)  Location:  Knee  Laterality:  Bilateral  Site:  Bilateral knee  Medications (Right):  40 mg triamcinolone acetonide 40 mg/mL  Medications (Left):  40 mg triamcinolone acetonide 40 mg/mL  Patient tolerance:  Patient tolerated the procedure well with no immediate complications     Ultrasound guidance was used for needle localization. Images were saved and stored for documentation. The appropriate structures were visualized. Dynamic visualization of the needle was continuous throughout the procedures and maintained good position.

## 2024-03-21 NOTE — PATIENT INSTRUCTIONS
Assessment:  Mary Russo is a 53 y.o. female   Chief Complaint   Patient presents with    Left Knee - Pain    Right Knee - Pain       Encounter Diagnosis   Name Primary?    Bilateral primary osteoarthritis of knee Yes        Plan:    Ultrasound Guided/ Cortizone steroid injection in both knees today.    We discussed the proper protocols after the injection such as no submerging pools, baths tubs, or hot tubs for 24 hr.  Showering is okay today.  We also discussed that blood sugars can be elevated after an injection and asked patient to properly checked her sugars over the next few days and contact their PCP if there are any concerns.  We discussed red flags such as fevers, chills, red, warm, tender joint at the area of injection to please seek medical care immediately.      Prior Authorization for IOVERA procedure        Follow-up: for iovera procedure.    Thank you for choosing [facility] and Dr. Skip Rich for your orthopedic & sports medicine care. It is our goal to provide you with exceptional care that will help keep you healthy, active, and get you back in the game.    Please do not hesitate to reach out to us via email, phone, or MyChart with any questions, concerns, or feedback.    If you felt that you received exemplary care today, please consider leaving us feedback on [facility] at:  [email or web address]    If you are experiencing pain/discomfort ,or have questions after 5pm and would like to be connected to the [facility] on-call team, please call this number and specify which Sports Medicine provider is treating you: [phone]

## 2024-03-21 NOTE — PROGRESS NOTES
Patient ID: Mary Russo  YOB: 1970  MRN: [id number]    Chief Complaint: right knee followup    HPI: here for follow up for bilateral knees. Having significant pain in both knees today and interested in steroid injection as well as discussing trying iovera procedure next. Gel injections were ineffective in the past. No new falls or injuries.     Interval History of Present Illness: Mary Russo is a right-hand dominant 53 y.o. female who presents for follow up s/p  bilateral knee pain. Right knee CSI 9/27/2023, worked well She reports right knee popping for the last week. Water aerobics does help, she has not been to the gym in a couple of days but she does intend to start going to water aerobic soon.  Sitting to standing her pain is 7/10,  She is here today for CSI in both knee.     The patient is active in none.  Occupation: Human resources      Past Medical History:   Past Medical History:   Diagnosis Date    Acid reflux     Anxiety     Arthritis     Carpal tunnel syndrome of right wrist     Cholelithiases     Hypertension     Iron deficiency     Morbid obesity      Past Surgical History:   Procedure Laterality Date    CHOLECYSTECTOMY      ESOPHAGOGASTRODUODENOSCOPY N/A 10/14/2019    Procedure: ESOPHAGOGASTRODUODENOSCOPY (EGD);  Surgeon: Stefanie Monique MD;  Location: [facility];  Service: Endoscopy;  Laterality: N/A;    ESOPHAGOGASTRODUODENOSCOPY N/A 12/23/2019    Procedure: EGD (ESOPHAGOGASTRODUODENOSCOPY);  Surgeon: Stefanie Monique MD;  Location: [facility];  Service: Endoscopy;  Laterality: N/A;    EYE SURGERY  2/2018    Lasik    HERNIA REPAIR      LAPAROSCOPIC GASTRIC BANDING  10/31/2017    removed    REFRACTIVE SURGERY      TUBAL LIGATION       Family History   Problem Relation Age of Onset    Cancer Mother 49        breast cancer    Heart disease Mother     Diabetes Mother     Stroke Mother     Obesity Mother     Breast cancer Mother     Hypertension Mother      Kidney disease Mother     Heart disease Father     Obesity Father     Obesity Sister     Obesity Maternal Grandmother     Obesity Sister     Colon cancer Neg Hx     Ovarian cancer Neg Hx      Social History     Socioeconomic History    Marital status:     Number of children: 3   Occupational History     Employer: [employer]   Tobacco Use    Smoking status: Never    Smokeless tobacco: Never   Substance and Sexual Activity    Alcohol use: Not Currently     Alcohol/week: 1.0 standard drink of alcohol     Types: 1 Glasses of wine per week     Comment: occasionally    Drug use: Never    Sexual activity: Yes     Partners: Male     Birth control/protection: Coitus interruptus, Other-see comments     Comment: tubal     Social Determinants of Health     Financial Resource Strain: Low Risk  (11/13/2023)    Overall Financial Resource Strain (CARDIA)     Difficulty of Paying Living Expenses: Not hard at all   Food Insecurity: No Food Insecurity (11/13/2023)    Hunger Vital Sign     Worried About Running Out of Food in the Last Year: Never true     Ran Out of Food in the Last Year: Never true   Transportation Needs: No Transportation Needs (11/13/2023)    PRAPARE - Transportation     Lack of Transportation (Medical): No     Lack of Transportation (Non-Medical): No   Physical Activity: Insufficiently Active (11/13/2023)    Exercise Vital Sign     Days of Exercise per Week: 2 days     Minutes of Exercise per Session: 50 min   Stress: No Stress Concern Present (11/13/2023)    [employer] [place] [employer] - Occupational Stress Questionnaire     Feeling of Stress : Only a little   Social Connections: Unknown (11/13/2023)    Social Connection and Isolation Panel [NHANES]     Frequency of Communication with Friends and Family: More than three times a week     Frequency of Social Gatherings with Friends and Family: Twice a week     Active Member of Clubs or Organizations: Yes      Attends Club or Organization Meetings: More than 4 times per year     Marital Status:    Housing Stability: Low Risk  (11/13/2023)    Housing Stability Vital Sign     Unable to Pay for Housing in the Last Year: No     Number of Places Lived in the Last Year: 1     Unstable Housing in the Last Year: No     Medication List with Changes/Refills   Current Medications    ACETAMINOPHEN (TYLENOL) 500 MG TABLET    Take 500 mg by mouth every 6 (six) hours as needed.    ALBUTEROL (PROVENTIL/VENTOLIN HFA) 90 MCG/ACTUATION INHALER    Inhale 2 puffs into the lungs every 6 (six) hours as needed for Wheezing or Shortness of Breath.    AMLODIPINE (NORVASC) 5 MG TABLET    Take 1 tablet (5 mg total) by mouth once daily.    CHOLECALCIFEROL, VITAMIN D3, (VITAMIN D3) 25 MCG (1,000 UNIT) CAPSULE    Take 1 capsule (1,000 Units total) by mouth once daily.    CLONIDINE (CATAPRES) 0.1 MG TABLET    Take 1 tablet (0.1 mg total) by mouth once daily.    DICLOFENAC SODIUM (VOLTAREN) 1 % GEL    Apply 2 g topically 4 (four) times daily.    FLUOXETINE 10 MG CAPSULE    Take 1 capsule (10 mg total) by mouth once daily.    PANTOPRAZOLE (PROTONIX) 40 MG TABLET    Take 1 tablet (40 mg total) by mouth once daily.     Review of patient's allergies indicates:   Allergen Reactions    Adhesive Swelling     Swelling x2 with flu shot and band-aid.  Swells in shape of band-aid.    Lisinopril Other (See Comments)     Cough    Latex, natural rubber Swelling     Localized swelling at site of bandaid/adhesive       Physical Exam:   Body mass index is 41.45 kg/m².    GENERAL: Well appearing, in no acute distress.  HEAD: Normocephalic and atraumatic.  ENT: External ears and nose grossly normal.  EYES: EOMI bilaterally  PULMONARY: Respirations are grossly even and non-labored.  NEURO: Awake, alert, and oriented x 3.  SKIN: No obvious rashes appreciated.  PSYCH: Mood & affect are appropriate.    Detailed MSK exam:     Right knee exam:   -ROM: extension 0,  flexion 110  -TTP: Medial joint line and Lateral joint line  -effusion: trace  -Patellar apprehension negative  -Hunter test negative  -stable to varus and valgus stress tests  -Lachman test negative, anterior drawer test negative, posterior drawer test negative    Left knee exam:   -ROM: extension 0, flexion 110  -TTP: Medial joint line and Lateral joint line  -effusion: trace  -Patellar apprehension negative  -Hunter test negative  -stable to varus and valgus stress tests  -Lachman test negative, anterior drawer test negative, posterior drawer test negative      Imaging:  Mammo Digital Screening Bilat w/ Lemuel  Narrative: Result:  Mammo Digital Screening Bilat w/ Lemuel    History:  Patient is 53 y.o. and is seen for a screening mammogram.    Films Compared:  Prior images (if available) were compared.     Findings:  This procedure was performed using tomosynthesis.   Computer-aided detection was utilized in the interpretation of this   examination.    The breasts are heterogeneously dense, which may obscure small masses.   There is no evidence of suspicious masses, microcalcifications or   architectural distortion.  Impression:    No mammographic evidence of malignancy.    BI-RADS Category 1: Negative    Recommendation:  Routine screening mammogram in 1 year is recommended.    Your estimated lifetime risk of breast cancer (to age 85) based on   Tyrer-Cuzick risk assessment model is 19.45 %.  According to the American   Cancer Society, patients with a lifetime breast cancer risk of 20% or   higher might benefit from supplemental screening tests, such as screening   breast MRI.        Relevant imaging results were reviewed and interpreted by me and per my read shows severe arthritic changes bilaterally.  This was discussed with the patient and / or family today.     Assessment:  Mary Russo is a 53 y.o. female following up for chronic bilateral knee pain. Interested in repeat steroid injection as well as  trying iovera because steroid injections aren't lasting as long as they used to.   Plan: Steroid injection given today (see separate procedure note for details). We discussed the proper protocols after the injection such as no submerging pools, baths tubs, or hot tubs for 24 hr.  Showering is okay today.  We also discussed that blood sugars can be elevated after an injection and asked patient to properly checked her sugars over the next few days and contact their PCP if there are any concerns.  We discussed red flags such as fevers, chills, red, warm, tender joint at the area of injection to please seek medical care immediately.   Gel injection was ineffective in the past. PT referral at the [facility] for aquatic therapy.   Patient has already tried multiple conservative treatment options including PT, steroid injection, and gel injections. Patient would like to hold off on knee replacement surgery as much as possible. Given that the patient has already failed multiple conservative treatment options thus far, recommend iovera cryoneurolysis procedure to help provide significant relief for their chronic knee pain so they can get back to normal ADLs and other activities they enjoy doing which have been limited due to knee pain. Ultrasound evaluation was completed today and 5 sensory nerves were easily identified which would benefit from treatment with iovera. Prior auth for iovera procedure.   Follow up for iovera procedure. All questions answered.     Bilateral primary osteoarthritis of knee  -     Sports Medicine US - Guidance for Needle Placement  -     Sports Medicine US - Extremity Non-Vascular LIMITED Right  -     Sports Medicine US - Extremity Non-Vascular LIMITED Left  -     Iovera; Future  -     Large Joint Aspiration/Injection: bilateral knee    Chronic pain of right knee  -     Iovera; Future         Ultrasound guidance was used for needle localization. Images were saved and stored for documentation. The  appropriate structures were visualized. Dynamic visualization of the needle was continuous throughout the procedures and maintained good position.      Time was spent discussing the cryoanalgesia procedure Iovera with the patient.  Risks and benefits were also discussed with patient.  X-rays were reviewed to use as a diagram to explain procedure. A detailed description of landmarks and placement of anesthetic used during procedure were also explained to patient.  Contraindications for procedure were discussed with patient.    More than 50% of the total time was spent face to face for counseling on diagnosis and treatment options. I also counseled patient  on common and most usual side effect of prescribed medications.  I reviewed Primary care, and other specialty's notes to better coordinate patient's care. Patient voiced understanding.       Electronically signed:  Skip Rich MD, MPH  03/21/2024  3:32 PM

## 2024-04-08 ENCOUNTER — TELEPHONE (OUTPATIENT)
Dept: SPORTS MEDICINE | Facility: CLINIC | Age: 54
End: 2024-04-08
Payer: COMMERCIAL

## 2024-04-08 DIAGNOSIS — M17.0 BILATERAL PRIMARY OSTEOARTHRITIS OF KNEE: Primary | ICD-10-CM

## 2024-04-08 NOTE — TELEPHONE ENCOUNTER
Called patient to let her know that upcoming Iovera procedures had been denied by insurance.  Discussed with patient the options of trying gel injections again or she could wait 3 months and try a longer lasting steroid injection, Zilretta and see if this is beneficial.  Explained that we would have to get authorization for this, like the gel injections, and she would have to wait 3 months for this.  Patient elected to try the Zilretta, as she did not receive relief from gel injections in the past.  Stated that if she didn't receive authorization from the Zilretta to go ahead and make referral with total joint specialist for surgical consult.

## 2024-04-25 DIAGNOSIS — N95.1 VASOMOTOR SYMPTOMS DUE TO MENOPAUSE: ICD-10-CM

## 2024-04-25 RX ORDER — CLONIDINE HYDROCHLORIDE 0.1 MG/1
0.1 TABLET ORAL
Qty: 90 TABLET | Refills: 2 | Status: SHIPPED | OUTPATIENT
Start: 2024-04-25

## 2024-05-17 ENCOUNTER — OFFICE VISIT (OUTPATIENT)
Dept: INTERNAL MEDICINE | Facility: CLINIC | Age: 54
End: 2024-05-17
Payer: COMMERCIAL

## 2024-05-17 VITALS
WEIGHT: 259.25 LBS | OXYGEN SATURATION: 98 % | HEART RATE: 86 BPM | BODY MASS INDEX: 41.85 KG/M2 | SYSTOLIC BLOOD PRESSURE: 138 MMHG | DIASTOLIC BLOOD PRESSURE: 84 MMHG

## 2024-05-17 DIAGNOSIS — E55.9 VITAMIN D DEFICIENCY: ICD-10-CM

## 2024-05-17 DIAGNOSIS — N95.1 MENOPAUSAL SYMPTOMS: ICD-10-CM

## 2024-05-17 DIAGNOSIS — K21.9 GASTROESOPHAGEAL REFLUX DISEASE, UNSPECIFIED WHETHER ESOPHAGITIS PRESENT: ICD-10-CM

## 2024-05-17 DIAGNOSIS — F41.9 ANXIETY AND DEPRESSION: ICD-10-CM

## 2024-05-17 DIAGNOSIS — D50.9 IRON DEFICIENCY ANEMIA, UNSPECIFIED IRON DEFICIENCY ANEMIA TYPE: ICD-10-CM

## 2024-05-17 DIAGNOSIS — Z12.11 COLON CANCER SCREENING: ICD-10-CM

## 2024-05-17 DIAGNOSIS — Z00.00 ROUTINE GENERAL MEDICAL EXAMINATION AT A HEALTH CARE FACILITY: Primary | ICD-10-CM

## 2024-05-17 DIAGNOSIS — E66.01 MORBID OBESITY WITH BMI OF 40.0-44.9, ADULT: ICD-10-CM

## 2024-05-17 DIAGNOSIS — I10 ESSENTIAL HYPERTENSION: ICD-10-CM

## 2024-05-17 DIAGNOSIS — F32.A ANXIETY AND DEPRESSION: ICD-10-CM

## 2024-05-17 PROBLEM — Z74.09 DECREASED FUNCTIONAL MOBILITY AND ENDURANCE: Status: RESOLVED | Noted: 2024-02-08 | Resolved: 2024-05-17

## 2024-05-17 PROBLEM — R52 PAIN: Status: RESOLVED | Noted: 2024-02-08 | Resolved: 2024-05-17

## 2024-05-17 PROBLEM — L60.8 DISCOLORATION OF NAILBEDS: Status: RESOLVED | Noted: 2020-06-16 | Resolved: 2024-05-17

## 2024-05-17 PROBLEM — M54.42 ACUTE BILATERAL LOW BACK PAIN WITH BILATERAL SCIATICA: Status: RESOLVED | Noted: 2020-06-16 | Resolved: 2024-05-17

## 2024-05-17 PROBLEM — M54.41 ACUTE BILATERAL LOW BACK PAIN WITH BILATERAL SCIATICA: Status: RESOLVED | Noted: 2020-06-16 | Resolved: 2024-05-17

## 2024-05-17 PROBLEM — R26.89 BALANCE PROBLEM: Status: RESOLVED | Noted: 2024-02-08 | Resolved: 2024-05-17

## 2024-05-17 PROBLEM — M67.471 GANGLION CYST OF RIGHT FOOT: Status: RESOLVED | Noted: 2021-01-11 | Resolved: 2024-05-17

## 2024-05-17 PROBLEM — R10.13 EPIGASTRIC ABDOMINAL PAIN: Status: RESOLVED | Noted: 2019-10-14 | Resolved: 2024-05-17

## 2024-05-17 PROCEDURE — 99999 PR PBB SHADOW E&M-EST. PATIENT-LVL IV: CPT | Mod: PBBFAC,,, | Performed by: PHYSICIAN ASSISTANT

## 2024-05-17 PROCEDURE — 1160F RVW MEDS BY RX/DR IN RCRD: CPT | Mod: CPTII,S$GLB,, | Performed by: PHYSICIAN ASSISTANT

## 2024-05-17 PROCEDURE — 3075F SYST BP GE 130 - 139MM HG: CPT | Mod: CPTII,S$GLB,, | Performed by: PHYSICIAN ASSISTANT

## 2024-05-17 PROCEDURE — 99396 PREV VISIT EST AGE 40-64: CPT | Mod: S$GLB,,, | Performed by: PHYSICIAN ASSISTANT

## 2024-05-17 PROCEDURE — 3079F DIAST BP 80-89 MM HG: CPT | Mod: CPTII,S$GLB,, | Performed by: PHYSICIAN ASSISTANT

## 2024-05-17 PROCEDURE — 3008F BODY MASS INDEX DOCD: CPT | Mod: CPTII,S$GLB,, | Performed by: PHYSICIAN ASSISTANT

## 2024-05-17 PROCEDURE — 1159F MED LIST DOCD IN RCRD: CPT | Mod: CPTII,S$GLB,, | Performed by: PHYSICIAN ASSISTANT

## 2024-05-17 RX ORDER — METOPROLOL SUCCINATE 25 MG/1
25 TABLET, EXTENDED RELEASE ORAL DAILY
Qty: 90 TABLET | Refills: 3 | Status: SHIPPED | OUTPATIENT
Start: 2024-05-17 | End: 2025-05-17

## 2024-05-17 RX ORDER — FLUOXETINE 10 MG/1
10 CAPSULE ORAL DAILY
Qty: 90 CAPSULE | Refills: 3 | Status: SHIPPED | OUTPATIENT
Start: 2024-05-17 | End: 2025-05-17

## 2024-05-17 RX ORDER — AMLODIPINE BESYLATE 5 MG/1
5 TABLET ORAL DAILY
Qty: 90 TABLET | Refills: 3 | Status: SHIPPED | OUTPATIENT
Start: 2024-05-17 | End: 2025-05-17

## 2024-05-17 NOTE — PROGRESS NOTES
Subjective:       Patient ID: Mary Russo is a 54 y.o. female.    Chief Complaint: Annual Exam      Patient presents to clinic today for annual physical exam.        Review of Systems   Constitutional:  Positive for fatigue. Negative for chills, fever and unexpected weight change.   HENT:  Negative for congestion, dental problem, ear pain, hearing loss, rhinorrhea and trouble swallowing.    Eyes:  Positive for visual disturbance (ongoing). Negative for pain.   Respiratory:  Positive for shortness of breath (chronic, intermittent). Negative for cough.    Cardiovascular:  Positive for palpitations (chronic, intermittent). Negative for chest pain and leg swelling.   Gastrointestinal:  Negative for abdominal distention, abdominal pain, blood in stool, constipation, diarrhea, nausea and vomiting.   Genitourinary:  Negative for difficulty urinating and vaginal discharge.   Musculoskeletal:  Positive for arthralgias (ongoing). Negative for myalgias.   Skin:  Negative for rash.   Neurological:  Negative for dizziness, weakness, numbness and headaches.   Hematological:  Negative for adenopathy. Does not bruise/bleed easily.   Psychiatric/Behavioral:  Positive for sleep disturbance (ongoing). Negative for dysphoric mood. The patient is nervous/anxious (ongoing).        Objective:      Physical Exam  Vitals and nursing note reviewed.   Constitutional:       General: She is not in acute distress.     Appearance: Normal appearance. She is well-developed.   HENT:      Head: Normocephalic and atraumatic.      Right Ear: Tympanic membrane, ear canal and external ear normal.      Left Ear: Tympanic membrane, ear canal and external ear normal.      Nose: Nose normal. No mucosal edema or rhinorrhea.      Mouth/Throat:      Lips: Pink.      Mouth: Mucous membranes are moist.      Pharynx: Oropharynx is clear. Uvula midline.   Eyes:      General: Lids are normal. No scleral icterus.     Extraocular Movements: Extraocular  movements intact.      Conjunctiva/sclera: Conjunctivae normal.      Pupils: Pupils are equal, round, and reactive to light.   Neck:      Thyroid: No thyromegaly.   Cardiovascular:      Rate and Rhythm: Normal rate and regular rhythm.      Pulses: Normal pulses.   Pulmonary:      Effort: Pulmonary effort is normal.      Breath sounds: Normal breath sounds. No wheezing, rhonchi or rales.   Abdominal:      General: Bowel sounds are normal. There is no distension.      Palpations: Abdomen is soft. There is no mass.      Tenderness: There is no abdominal tenderness.   Musculoskeletal:         General: Normal range of motion.      Cervical back: Normal range of motion and neck supple.      Right lower leg: No edema.      Left lower leg: No edema.   Lymphadenopathy:      Cervical: No cervical adenopathy.   Skin:     General: Skin is warm and dry.      Findings: No lesion or rash.      Nails: There is no clubbing.   Neurological:      Mental Status: She is alert.      Cranial Nerves: No cranial nerve deficit.      Sensory: No sensory deficit.   Psychiatric:         Mood and Affect: Mood and affect normal.         Assessment:       1. Routine general medical examination at a health care facility    2. Anxiety and depression    3. Essential hypertension    4. Iron deficiency anemia, unspecified iron deficiency anemia type    5. Vitamin D deficiency    6. Gastroesophageal reflux disease, unspecified whether esophagitis present    7. Colon cancer screening    8. Menopausal symptoms    9. Morbid obesity with BMI of 40.0-44.9, adult        Plan:   1. Routine general medical examination at a health care facility  -     Comprehensive Metabolic Panel; Future; Expected date: 05/17/2024  -     Lipid Panel; Future; Expected date: 05/17/2024  -     TSH; Future; Expected date: 05/17/2024  -     Hemoglobin A1C; Future; Expected date: 05/17/2024    2. Anxiety and depression  Overview:  Stable on prozac    Orders:  -     FLUoxetine 10 MG  capsule; Take 1 capsule (10 mg total) by mouth once daily.  Dispense: 90 capsule; Refill: 3    3. Essential hypertension  Assessment & Plan:  /84, controlled, continue amlodipine, metoprolol and clonidine (for menopause VMS), if having low BP readings at home, advised to decrease amlodipine to 2.5mg      Orders:  -     metoprolol succinate (TOPROL-XL) 25 MG 24 hr tablet; Take 1 tablet (25 mg total) by mouth once daily.  Dispense: 90 tablet; Refill: 3  -     amLODIPine (NORVASC) 5 MG tablet; Take 1 tablet (5 mg total) by mouth once daily.  Dispense: 90 tablet; Refill: 3    4. Iron deficiency anemia, unspecified iron deficiency anemia type  Assessment & Plan:  Status pending lab     Orders:  -     CBC Auto Differential; Future; Expected date: 05/17/2024  -     Iron and TIBC; Future; Expected date: 05/17/2024  -     Ferritin; Future; Expected date: 05/17/2024    5. Vitamin D deficiency  Overview:  Continue supplement     Orders:  -     Vitamin D; Future; Expected date: 05/17/2024    6. Gastroesophageal reflux disease, unspecified whether esophagitis present  Overview:  Stable on Protonix      7. Colon cancer screening  -     Cologuard Screening (Multitarget Stool DNA); Future; Expected date: 05/17/2024    8. Menopausal symptoms  Overview:  Stable on Prozac    Orders:  -     FLUoxetine 10 MG capsule; Take 1 capsule (10 mg total) by mouth once daily.  Dispense: 90 capsule; Refill: 3    9. Morbid obesity with BMI of 40.0-44.9, adult  Assessment & Plan:  Wt Readings from Last 10 Encounters:   05/17/24 117.6 kg (259 lb 4.2 oz)   04/18/24 113.9 kg (251 lb)   03/21/24 116.5 kg (256 lb 13.4 oz)   01/19/24 116.5 kg (256 lb 13.4 oz)   01/10/24 117 kg (258 lb)   11/16/23 117.1 kg (258 lb 2.5 oz)   09/27/23 116.9 kg (257 lb 11.5 oz)   09/07/23 116.9 kg (257 lb 11.5 oz)   07/20/23 115.2 kg (254 lb)   05/10/23 115.5 kg (254 lb 11.9 oz)     Body mass index is 41.85 kg/m².  Counseled on diet and exercise.           Fasting labs  ASAP  Cologuard ordered  Discussed shingrix vaccine, advised can be obtained at pharmacy.   6 month f/u with Dr. Aiken scheduled   Health Maintenance reviewed/updated.

## 2024-05-17 NOTE — ASSESSMENT & PLAN NOTE
Wt Readings from Last 10 Encounters:   05/17/24 117.6 kg (259 lb 4.2 oz)   04/18/24 113.9 kg (251 lb)   03/21/24 116.5 kg (256 lb 13.4 oz)   01/19/24 116.5 kg (256 lb 13.4 oz)   01/10/24 117 kg (258 lb)   11/16/23 117.1 kg (258 lb 2.5 oz)   09/27/23 116.9 kg (257 lb 11.5 oz)   09/07/23 116.9 kg (257 lb 11.5 oz)   07/20/23 115.2 kg (254 lb)   05/10/23 115.5 kg (254 lb 11.9 oz)     Body mass index is 41.85 kg/m².  Counseled on diet and exercise.

## 2024-05-17 NOTE — ASSESSMENT & PLAN NOTE
/84, controlled, continue amlodipine, metoprolol and clonidine (for menopause VMS), if having low BP readings at home, advised to decrease amlodipine to 2.5mg

## 2024-05-25 LAB
25(OH)D3+25(OH)D2 SERPL-MCNC: 22.9 NG/ML (ref 30–100)
ALBUMIN SERPL-MCNC: 3.6 G/DL (ref 3.8–4.9)
ALBUMIN/GLOB SERPL: 1.3 {RATIO} (ref 1.2–2.2)
ALP SERPL-CCNC: 76 IU/L (ref 44–121)
ALT SERPL-CCNC: 8 IU/L (ref 0–32)
AST SERPL-CCNC: 11 IU/L (ref 0–40)
BASOPHILS # BLD AUTO: 0.1 X10E3/UL (ref 0–0.2)
BASOPHILS NFR BLD AUTO: 1 %
BILIRUB SERPL-MCNC: 0.4 MG/DL (ref 0–1.2)
BUN SERPL-MCNC: 8 MG/DL (ref 6–24)
BUN/CREAT SERPL: 10 (ref 9–23)
CALCIUM SERPL-MCNC: 9.3 MG/DL (ref 8.7–10.2)
CHLORIDE SERPL-SCNC: 104 MMOL/L (ref 96–106)
CHOLEST SERPL-MCNC: 212 MG/DL (ref 100–199)
CO2 SERPL-SCNC: 27 MMOL/L (ref 20–29)
CREAT SERPL-MCNC: 0.77 MG/DL (ref 0.57–1)
EOSINOPHIL # BLD AUTO: 0.2 X10E3/UL (ref 0–0.4)
EOSINOPHIL NFR BLD AUTO: 3 %
ERYTHROCYTE [DISTWIDTH] IN BLOOD BY AUTOMATED COUNT: 12.9 % (ref 11.7–15.4)
EST. GFR  (NO RACE VARIABLE): 93 ML/MIN/1.73
FERRITIN SERPL-MCNC: 78 NG/ML (ref 15–150)
GLOBULIN SER CALC-MCNC: 2.8 G/DL (ref 1.5–4.5)
GLUCOSE SERPL-MCNC: 85 MG/DL (ref 70–99)
HBA1C MFR BLD: 5.1 % (ref 4.8–5.6)
HCT VFR BLD AUTO: 36.1 % (ref 34–46.6)
HDLC SERPL-MCNC: 61 MG/DL
HGB BLD-MCNC: 11.5 G/DL (ref 11.1–15.9)
IMM GRANULOCYTES # BLD AUTO: 0 X10E3/UL (ref 0–0.1)
IMM GRANULOCYTES NFR BLD AUTO: 0 %
IRON SATN MFR SERPL: 17 % (ref 15–55)
IRON SERPL-MCNC: 51 UG/DL (ref 27–159)
LDLC SERPL CALC-MCNC: 138 MG/DL (ref 0–99)
LYMPHOCYTES # BLD AUTO: 2.2 X10E3/UL (ref 0.7–3.1)
LYMPHOCYTES NFR BLD AUTO: 31 %
MCH RBC QN AUTO: 29.4 PG (ref 26.6–33)
MCHC RBC AUTO-ENTMCNC: 31.9 G/DL (ref 31.5–35.7)
MCV RBC AUTO: 92 FL (ref 79–97)
MONOCYTES # BLD AUTO: 0.6 X10E3/UL (ref 0.1–0.9)
MONOCYTES NFR BLD AUTO: 8 %
NEUTROPHILS # BLD AUTO: 4.2 X10E3/UL (ref 1.4–7)
NEUTROPHILS NFR BLD AUTO: 57 %
PLATELET # BLD AUTO: 277 X10E3/UL (ref 150–450)
POTASSIUM SERPL-SCNC: 4.3 MMOL/L (ref 3.5–5.2)
PROT SERPL-MCNC: 6.4 G/DL (ref 6–8.5)
RBC # BLD AUTO: 3.91 X10E6/UL (ref 3.77–5.28)
SODIUM SERPL-SCNC: 142 MMOL/L (ref 134–144)
TIBC SERPL-MCNC: 296 UG/DL (ref 250–450)
TRIGL SERPL-MCNC: 72 MG/DL (ref 0–149)
TSH SERPL DL<=0.005 MIU/L-ACNC: 0.99 UIU/ML (ref 0.45–4.5)
UIBC SERPL-MCNC: 245 UG/DL (ref 131–425)
VLDLC SERPL CALC-MCNC: 13 MG/DL (ref 5–40)
WBC # BLD AUTO: 7.2 X10E3/UL (ref 3.4–10.8)

## 2024-05-29 ENCOUNTER — PATIENT MESSAGE (OUTPATIENT)
Dept: INTERNAL MEDICINE | Facility: CLINIC | Age: 54
End: 2024-05-29
Payer: COMMERCIAL

## 2024-05-29 DIAGNOSIS — E55.9 VITAMIN D DEFICIENCY: ICD-10-CM

## 2024-05-29 RX ORDER — VIT C/E/ZN/COPPR/LUTEIN/ZEAXAN 250MG-90MG
2000 CAPSULE ORAL DAILY
COMMUNITY
Start: 2024-05-29

## 2024-06-05 LAB — NONINV COLON CA DNA+OCC BLD SCRN STL QL: NEGATIVE

## 2024-06-21 ENCOUNTER — TELEPHONE (OUTPATIENT)
Dept: SPORTS MEDICINE | Facility: CLINIC | Age: 54
End: 2024-06-21
Payer: COMMERCIAL

## 2024-06-21 DIAGNOSIS — M17.0 BILATERAL PRIMARY OSTEOARTHRITIS OF KNEE: Primary | ICD-10-CM

## 2024-06-21 NOTE — TELEPHONE ENCOUNTER
Contacted patient to let her know that [employer] denied Zilretta.  Patient asked if she could try another steroid injection, different type, at the upcoming appointment and go ahead and refer to joint replacement specialist.  Patient will come in on June 27 for different type of CSI, other than Kenalog.  Scheduled appt with Dr. Bedoya for surgical consult.  If this CSI fails we can try and resubmit for Zilretta while waiting to see Dr. Bedoya.

## 2024-06-27 ENCOUNTER — OFFICE VISIT (OUTPATIENT)
Dept: SPORTS MEDICINE | Facility: CLINIC | Age: 54
End: 2024-06-27
Payer: COMMERCIAL

## 2024-06-27 ENCOUNTER — PATIENT MESSAGE (OUTPATIENT)
Dept: SPORTS MEDICINE | Facility: CLINIC | Age: 54
End: 2024-06-27

## 2024-06-27 VITALS — BODY MASS INDEX: 41.66 KG/M2 | WEIGHT: 259.25 LBS | HEIGHT: 66 IN

## 2024-06-27 DIAGNOSIS — M17.0 BILATERAL PRIMARY OSTEOARTHRITIS OF KNEE: Primary | ICD-10-CM

## 2024-06-27 PROCEDURE — 99999 PR PBB SHADOW E&M-EST. PATIENT-LVL IV: CPT | Mod: PBBFAC,,, | Performed by: STUDENT IN AN ORGANIZED HEALTH CARE EDUCATION/TRAINING PROGRAM

## 2024-06-27 RX ORDER — METHYLPREDNISOLONE ACETATE 80 MG/ML
80 INJECTION, SUSPENSION INTRA-ARTICULAR; INTRALESIONAL; INTRAMUSCULAR; SOFT TISSUE
Status: DISCONTINUED | OUTPATIENT
Start: 2024-06-27 | End: 2024-06-27 | Stop reason: HOSPADM

## 2024-06-27 RX ADMIN — METHYLPREDNISOLONE ACETATE 80 MG: 80 INJECTION, SUSPENSION INTRA-ARTICULAR; INTRALESIONAL; INTRAMUSCULAR; SOFT TISSUE at 04:06

## 2024-06-27 NOTE — PROGRESS NOTES
Patient ID: Mary Russo  YOB: 1970  MRN: [id number]    Chief Complaint: Pain of the Left Knee and Pain of the Right Knee      History of Present Illness: Mary Russo is a right-hand dominant 54 y.o. female who presents today for followup bilateral knee pain. Having significant pain in both knees today, WATSONVERA was denied and Zilretta was denied by her insurance. She was told by her insurance company that she needed to try and fail at least 2 steroid injections.  No new falls or injuries. She is here to today to try a different CSI in both knees. Her pain is 9/10 on pain scale. She is scheduled to go to [place] for work at the beginning of July, she is hoping that steroid today gives much relief.      The patient is active in none.        Past Medical History:   Past Medical History:   Diagnosis Date    Acid reflux     Anxiety     Arthritis     Carpal tunnel syndrome of right wrist     Cholelithiases     Hypertension     Iron deficiency     Morbid obesity      Past Surgical History:   Procedure Laterality Date    CHOLECYSTECTOMY      ESOPHAGOGASTRODUODENOSCOPY N/A 10/14/2019    Procedure: ESOPHAGOGASTRODUODENOSCOPY (EGD);  Surgeon: Stefanie Monique MD;  Location: [facility];  Service: Endoscopy;  Laterality: N/A;    ESOPHAGOGASTRODUODENOSCOPY N/A 12/23/2019    Procedure: EGD (ESOPHAGOGASTRODUODENOSCOPY);  Surgeon: Stefanie Monique MD;  Location: [facility];  Service: Endoscopy;  Laterality: N/A;    EYE SURGERY  02/2018    Lasik    HERNIA REPAIR      LAPAROSCOPIC GASTRIC BANDING  10/31/2017    removed    REFRACTIVE SURGERY      TUBAL LIGATION       Family History   Problem Relation Name Age of Onset    Cancer Mother Rosie 49        breast cancer    Heart disease Mother Rosie     Diabetes Mother Rosie     Stroke Mother Rosie     Obesity Mother Rosie     Breast cancer Mother Rosie     Hypertension Mother Rosie     Kidney disease Mother Rosie     Heart disease Father  meagan     Obesity Father meagan     Obesity Sister      Obesity Maternal Grandmother      Obesity Sister      Colon cancer Neg Hx      Ovarian cancer Neg Hx       Social History     Socioeconomic History    Marital status:     Number of children: 3   Occupational History     Employer: [employer]   Tobacco Use    Smoking status: Never    Smokeless tobacco: Never   Substance and Sexual Activity    Alcohol use: Not Currently     Alcohol/week: 1.0 standard drink of alcohol     Types: 1 Glasses of wine per week     Comment: occasionally    Drug use: Never    Sexual activity: Yes     Partners: Male     Birth control/protection: Coitus interruptus, Other-see comments     Comment: tubal     Social Determinants of Health     Financial Resource Strain: Low Risk  (11/13/2023)    Overall Financial Resource Strain (CARDIA)     Difficulty of Paying Living Expenses: Not hard at all   Food Insecurity: No Food Insecurity (11/13/2023)    Hunger Vital Sign     Worried About Running Out of Food in the Last Year: Never true     Ran Out of Food in the Last Year: Never true   Transportation Needs: No Transportation Needs (11/13/2023)    PRAPARE - Transportation     Lack of Transportation (Medical): No     Lack of Transportation (Non-Medical): No   Physical Activity: Insufficiently Active (11/13/2023)    Exercise Vital Sign     Days of Exercise per Week: 2 days     Minutes of Exercise per Session: 50 min   Stress: No Stress Concern Present (11/13/2023)    [place] [employer] - Occupational Stress Questionnaire     Feeling of Stress : Only a little   Housing Stability: Low Risk  (11/13/2023)    Housing Stability Vital Sign     Unable to Pay for Housing in the Last Year: No     Number of Places Lived in the Last Year: 1     Unstable Housing in the Last Year: No     Medication List with Changes/Refills   Current Medications    ACETAMINOPHEN (TYLENOL) 500 MG TABLET    Take 500 mg  by mouth every 6 (six) hours as needed.    AMLODIPINE (NORVASC) 5 MG TABLET    Take 1 tablet (5 mg total) by mouth once daily.    CHOLECALCIFEROL, VITAMIN D3, (VITAMIN D3) 25 MCG (1,000 UNIT) CAPSULE    Take 2 capsules (2,000 Units total) by mouth once daily.    CLONIDINE (CATAPRES) 0.1 MG TABLET    TAKE 1 TABLET BY MOUTH ONCE DAILY.    DICLOFENAC SODIUM (VOLTAREN) 1 % GEL    Apply 2 g topically 4 (four) times daily.    FLUOXETINE 10 MG CAPSULE    Take 1 capsule (10 mg total) by mouth once daily.    METOPROLOL SUCCINATE (TOPROL-XL) 25 MG 24 HR TABLET    Take 1 tablet (25 mg total) by mouth once daily.    PANTOPRAZOLE (PROTONIX) 40 MG TABLET    Take 1 tablet (40 mg total) by mouth once daily.     Review of patient's allergies indicates:   Allergen Reactions    Adhesive Swelling     Swelling x2 with flu shot and band-aid.  Swells in shape of band-aid.    Lisinopril Other (See Comments)     Cough    Latex, natural rubber Swelling     Localized swelling at site of bandaid/adhesive       Physical Exam:   Body mass index is 41.85 kg/m².    GENERAL: Well appearing, in no acute distress.  HEAD: Normocephalic and atraumatic.  ENT: External ears and nose grossly normal.  EYES: EOMI bilaterally  PULMONARY: Respirations are grossly even and non-labored.  NEURO: Awake, alert, and oriented x 3.  SKIN: No obvious rashes appreciated.  PSYCH: Mood & affect are appropriate.    Detailed MSK exam:     Right knee exam:   -ROM: extension 0, flexion 110  -TTP: Medial joint line and Lateral joint line  -effusion: trace  -Patellar apprehension negative  -Hunter test negative  -stable to varus and valgus stress tests  -Lachman test negative, anterior drawer test negative, posterior drawer test negative     Left knee exam:   -ROM: extension 0, flexion 110  -TTP: Medial joint line and Lateral joint line  -effusion: trace  -Patellar apprehension negative  -Hunter test negative  -stable to varus and valgus stress tests  -Lachman test  negative, anterior drawer test negative, posterior drawer test negative    Imaging:  Sports Medicine US - Extremity Non-Vascular LIMITED Left  Skip Rich MD     3/21/2024  1:30 PM  Sports Medicine US - Extremity Non-Vascular LIMITED Left    Date/Time: 3/21/2024 11:40 AM    Performed by: Skip Rich MD  Authorized by: Skip Rich MD  Preparation: Patient was prepped and   draped in the usual sterile fashion.  Local anesthesia used: no    Anesthesia:  Local anesthesia used: no    Sedation:  Patient sedated: no    Patient tolerance: patient tolerated the procedure well with no immediate   complications  Comments: Limited ultrasound of the left knee was utilized to map out and   visualize the superficial sensory nerves in proximal thigh and medial knee   that were targeted for the iovera procedure. The superior and inferior   branches of the infrapatellar saphenous nerve, the medial femoral   cutaneous nerve, the anterior femoral cutaneous nerve, and the lateral   femoral cutaneous nerve were all visualized. Images saved in the EMR for   documentation.   Sports Medicine US - Extremity Non-Vascular LIMITED Right  Skip Rich MD     3/21/2024  1:30 PM  Sports Medicine US - Extremity Non-Vascular LIMITED Right    Date/Time: 3/21/2024 11:40 AM    Performed by: Skip Rich MD  Authorized by: Skip Rich MD  Preparation: Patient was prepped and   draped in the usual sterile fashion.  Local anesthesia used: no    Anesthesia:  Local anesthesia used: no    Sedation:  Patient sedated: no    Patient tolerance: patient tolerated the procedure well with no immediate   complications  Comments: Limited ultrasound of the right knee was utilized to map out and   visualize the superficial sensory nerves in proximal thigh and medial knee   that were targeted for the iovera procedure. The superior and inferior   branches of the infrapatellar saphenous nerve, the medial femoral   cutaneous nerve,  the anterior femoral cutaneous nerve, and the lateral   femoral cutaneous nerve were all visualized. Images saved in the EMR for   documentation.   Sports Medicine US - Guidance for Needle Placement  Skip Rich MD     3/21/2024  1:30 PM  Sports Medicine US - Guidance for Needle Placement    Date/Time: 3/21/2024 11:40 AM    Performed by: Skip Rich MD  Authorized by: Skip Rich MD  Preparation: Patient was prepped and   draped in the usual sterile fashion.  Local anesthesia used: no    Anesthesia:  Local anesthesia used: no    Sedation:  Patient sedated: no    Patient tolerance: patient tolerated the procedure well with no immediate   complications  Comments: Ultrasound guidance was used for needle localization. Images   were saved and stored for documentation. The appropriate structures were   visualized. Dynamic visualization of the needle was continuous throughout   the procedures and maintained good position.         Relevant imaging results were reviewed and interpreted by me and per my read shows severe arthritic changes bilaterally.  This was discussed with the patient and / or family today.     Assessment:  Mary Russo is a 54 y.o. female following up for bilateral knee pain.   Plan: zilretta injections denied by insurance due to not trying multiple types of steroid injections so we will try a different steroid medication for injection today.   Steroid injection given today (see separate procedure note for details). We discussed the proper protocols after the injection such as no submerging pools, baths tubs, or hot tubs for 24 hr.  Showering is okay today.  We also discussed that blood sugars can be elevated after an injection and asked patient to properly checked her sugars over the next few days and contact their PCP if there are any concerns.  We discussed red flags such as fevers, chills, red, warm, tender joint at the area of injection to please seek medical care  immediately.   Follow up 3 months. All questions answered.     Bilateral primary osteoarthritis of knee  -     Large Joint Aspiration/Injection: bilateral knee  -     Sports Medicine US - Guidance for Needle Placement         Ultrasound guidance was used for needle localization. Images were saved and stored for documentation. The appropriate structures were visualized. Dynamic visualization of the needle was continuous throughout the procedures and maintained good position.      Electronically signed:  Skip Rich MD, MPH  06/27/2024  4:36 PM

## 2024-06-27 NOTE — PROCEDURES
Sports Medicine US - Guidance for Needle Placement    Date/Time: 6/27/2024 4:20 PM    Performed by: Skip Rich MD  Authorized by: Skip Rich MD  Preparation: Patient was prepped and draped in the usual sterile fashion.  Local anesthesia used: no    Anesthesia:  Local anesthesia used: no    Sedation:  Patient sedated: no    Patient tolerance: patient tolerated the procedure well with no immediate complications  Comments: Ultrasound guidance was used for needle localization. Images were saved and stored for documentation. The appropriate structures were visualized. Dynamic visualization of the needle was continuous throughout the procedures and maintained good position.

## 2024-06-27 NOTE — PATIENT INSTRUCTIONS
Assessment:  Mary Russo is a 54 y.o. female   Chief Complaint   Patient presents with    Left Knee - Pain    Right Knee - Pain       Encounter Diagnosis   Name Primary?    Bilateral primary osteoarthritis of knee Yes        Plan:  Ultrasound guided Cortizone steroid injection given in both knees yoday  We discussed the proper protocols after the injection such as no submerging pools, baths tubs, or hot tubs for 24 hr.  Showering is okay today.  We also discussed that blood sugars can be elevated after an injection and asked patient to properly checked her sugars over the next few days and contact their PCP if there are any concerns.  We discussed red flags such as fevers, chills, red, warm, tender joint at the area of injection to please seek medical care immediately.        Follow-up: 3 months or sooner if there are problems between now and then.    Thank you for choosing [facility] and Dr. Skip Rich for your orthopedic & sports medicine care. It is our goal to provide you with exceptional care that will help keep you healthy, active, and get you back in the game.    Please do not hesitate to reach out to us via email, phone, or MyChart with any questions, concerns, or feedback.    If you felt that you received exemplary care today, please consider leaving us feedback on [employer] at:  [email or web address]    If you are experiencing pain/discomfort ,or have questions after 5pm and would like to be connected to the [facility] on-call team, please call this number and specify which Sports Medicine provider is treating you: [phone]

## 2024-06-27 NOTE — PROCEDURES
Large Joint Aspiration/Injection: bilateral knee    Date/Time: 6/27/2024 4:20 PM    Performed by: Skip Rich MD  Authorized by: Skip Rich MD    Consent Done?:  Yes (Verbal)  Indications:  Arthritis and pain  Site marked: the procedure site was marked    Timeout: prior to procedure the correct patient, procedure, and site was verified    Prep: patient was prepped and draped in usual sterile fashion    Local anesthetic:  Bupivacaine 0.5% without epinephrine and lidocaine 1% without epinephrine    Details:  Needle Size:  21 G  Ultrasonic Guidance for needle placement?: Yes    Images are saved and documented.  Approach:  Lateral (superior)  Location:  Knee  Laterality:  Bilateral  Site:  Bilateral knee  Medications (Right):  80 mg methylPREDNISolone acetate 80 mg/mL  Medications (Left):  80 mg methylPREDNISolone acetate 80 mg/mL  Patient tolerance:  Patient tolerated the procedure well with no immediate complications     Ultrasound guidance was used for needle localization. Images were saved and stored for documentation. The appropriate structures were visualized. Dynamic visualization of the needle was continuous throughout the procedures and maintained good position.

## 2024-06-28 DIAGNOSIS — M17.0 PRIMARY OSTEOARTHRITIS OF BOTH KNEES: Primary | ICD-10-CM

## 2024-07-09 ENCOUNTER — PATIENT MESSAGE (OUTPATIENT)
Dept: SPORTS MEDICINE | Facility: CLINIC | Age: 54
End: 2024-07-09
Payer: COMMERCIAL

## 2024-07-25 ENCOUNTER — PATIENT MESSAGE (OUTPATIENT)
Dept: INTERNAL MEDICINE | Facility: CLINIC | Age: 54
End: 2024-07-25
Payer: COMMERCIAL

## 2024-08-14 ENCOUNTER — PATIENT MESSAGE (OUTPATIENT)
Dept: SPORTS MEDICINE | Facility: CLINIC | Age: 54
End: 2024-08-14
Payer: COMMERCIAL

## 2024-08-15 ENCOUNTER — TELEPHONE (OUTPATIENT)
Dept: SPORTS MEDICINE | Facility: CLINIC | Age: 54
End: 2024-08-15
Payer: COMMERCIAL

## 2024-08-15 NOTE — TELEPHONE ENCOUNTER
Faxed and received email confirmation of receipt for compounding cream prescription.  Sent message to patient to let her know that prescription has been sent.

## 2024-08-20 DIAGNOSIS — K21.9 GASTROESOPHAGEAL REFLUX DISEASE WITHOUT ESOPHAGITIS: ICD-10-CM

## 2024-08-20 NOTE — TELEPHONE ENCOUNTER
No care due was identified.  [facility] Embedded Care Due Messages. Reference number: [id number].   8/20/2024 9:53:02 AM CDT

## 2024-08-21 RX ORDER — LANSOPRAZOLE 30 MG/1
30 CAPSULE, DELAYED RELEASE ORAL DAILY
Qty: 90 CAPSULE | Refills: 3 | Status: SHIPPED | OUTPATIENT
Start: 2024-08-21 | End: 2025-08-21

## 2024-08-21 NOTE — TELEPHONE ENCOUNTER
Refill Routing Note   Medication(s) are not appropriate for processing by [facility] for the following reason(s):        Clarification of medication (Rx) details    [facility] action(s):  Defer        Medication Therapy Plan: Alternative Requested:PLAN LIMITATIONS EXCEEDED;  PRIOR AUTHORIZATION REQUIRED.      Appointments  past 12m or future 3m with PCP    Date Provider   Last Visit   11/16/2023 Deloris Aiken MD   Next Visit   11/18/2024 Deloris Aiken MD   ED visits in past 90 days: 0        Note composed:10:25 PM 08/20/2024

## 2024-09-20 ENCOUNTER — PATIENT MESSAGE (OUTPATIENT)
Dept: SPORTS MEDICINE | Facility: CLINIC | Age: 54
End: 2024-09-20
Payer: COMMERCIAL

## 2024-09-26 ENCOUNTER — OFFICE VISIT (OUTPATIENT)
Dept: SPORTS MEDICINE | Facility: CLINIC | Age: 54
End: 2024-09-26
Payer: COMMERCIAL

## 2024-09-26 DIAGNOSIS — M17.0 BILATERAL PRIMARY OSTEOARTHRITIS OF KNEE: Primary | ICD-10-CM

## 2024-09-26 PROCEDURE — 99999 PR PBB SHADOW E&M-EST. PATIENT-LVL III: CPT | Mod: PBBFAC,,, | Performed by: STUDENT IN AN ORGANIZED HEALTH CARE EDUCATION/TRAINING PROGRAM

## 2024-09-26 RX ORDER — KETOROLAC TROMETHAMINE 30 MG/ML
30 INJECTION, SOLUTION INTRAMUSCULAR; INTRAVENOUS
Status: DISCONTINUED | OUTPATIENT
Start: 2024-09-26 | End: 2024-09-26 | Stop reason: HOSPADM

## 2024-09-26 RX ADMIN — KETOROLAC TROMETHAMINE 30 MG: 30 INJECTION, SOLUTION INTRAMUSCULAR; INTRAVENOUS at 02:09

## 2024-09-26 NOTE — PROGRESS NOTES
Patient ID: Mary Russo  YOB: 1970  MRN: [id number]    Chief Complaint: Pain of the Right Knee and Pain of the Left Knee          History of Present Illness: Mary Russo is a right-hand dominant 54 y.o. female who presents today for followup bilateral knee pain. Having significant pain in both knees today, IOVERA was denied and Zilretta was denied by her insurance. She was told by her insurance company that she needed to try and fail at least 2 steroid injections.  Her knees  still  have an dull ache and popping. Has to stand and wait awhile before taking a step.  She has tried  methylPREDNISolone in June and kenalog in March  with about 2 weeks of pain relief from each of them.  No new falls or injuries. She is here to today to discuss her next step in pain management , Her pain is 9/10 on pain scale.     06/27/2024 Interval History of Present Illness: Mary Russo is a right-hand dominant 54 y.o. female who presents today for followup bilateral knee pain. Having significant pain in both knees today, IOVERA was denied and Zilretta was denied by her insurance. She was told by her insurance company that she needed to try and fail at least 2 steroid injections.  No new falls or injuries. She is here to today to try a different CSI in both knees. Her pain is 9/10 on pain scale. She is scheduled to go to [place] for work at the beginning of July, she is hoping that steroid today gives much relief.      The patient is active in none.        Past Medical History:   Past Medical History:   Diagnosis Date    Acid reflux     Anxiety     Arthritis     Carpal tunnel syndrome of right wrist     Cholelithiases     Hypertension     Iron deficiency     Morbid obesity      Past Surgical History:   Procedure Laterality Date    CHOLECYSTECTOMY      ESOPHAGOGASTRODUODENOSCOPY N/A 10/14/2019    Procedure: ESOPHAGOGASTRODUODENOSCOPY (EGD);  Surgeon: Stefanie Monique MD;  Location: [facility]  ENDO;  Service: Endoscopy;  Laterality: N/A;    ESOPHAGOGASTRODUODENOSCOPY N/A 12/23/2019    Procedure: EGD (ESOPHAGOGASTRODUODENOSCOPY);  Surgeon: Stefanie Moinque MD;  Location: [facility];  Service: Endoscopy;  Laterality: N/A;    EYE SURGERY  02/2018    Lasik    HERNIA REPAIR      LAPAROSCOPIC GASTRIC BANDING  10/31/2017    removed    REFRACTIVE SURGERY      TUBAL LIGATION       Family History   Problem Relation Name Age of Onset    Cancer Mother Rosie 49        breast cancer    Heart disease Mother Rosie     Diabetes Mother Rosie     Stroke Mother Rosie     Obesity Mother Rosie     Breast cancer Mother Rosie     Hypertension Mother Rosie     Kidney disease Mother Rosie     Heart disease Father meagan     Obesity Father meagan     Obesity Sister      Obesity Maternal Grandmother      Obesity Sister      Colon cancer Neg Hx      Ovarian cancer Neg Hx       Social History     Socioeconomic History    Marital status:     Number of children: 3   Occupational History     Employer: [employer]   Tobacco Use    Smoking status: Never    Smokeless tobacco: Never   Substance and Sexual Activity    Alcohol use: Not Currently     Alcohol/week: 1.0 standard drink of alcohol     Types: 1 Glasses of wine per week     Comment: occasionally    Drug use: Never    Sexual activity: Yes     Partners: Male     Birth control/protection: Coitus interruptus, Other-see comments     Comment: tubal     Social Determinants of Health     Financial Resource Strain: Low Risk  (11/13/2023)    Overall Financial Resource Strain (CARDIA)     Difficulty of Paying Living Expenses: Not hard at all   Food Insecurity: No Food Insecurity (11/13/2023)    Hunger Vital Sign     Worried About Running Out of Food in the Last Year: Never true     Ran Out of Food in the Last Year: Never true   Transportation Needs: No Transportation Needs (11/13/2023)    PRAPARE - Transportation     Lack of Transportation  (Medical): No     Lack of Transportation (Non-Medical): No   Physical Activity: Insufficiently Active (11/13/2023)    Exercise Vital Sign     Days of Exercise per Week: 2 days     Minutes of Exercise per Session: 50 min   Stress: No Stress Concern Present (11/13/2023)    [place] [employer] - Occupational Stress Questionnaire     Feeling of Stress : Only a little   Housing Stability: Low Risk  (11/13/2023)    Housing Stability Vital Sign     Unable to Pay for Housing in the Last Year: No     Number of Places Lived in the Last Year: 1     Unstable Housing in the Last Year: No     Medication List with Changes/Refills   Current Medications    ACETAMINOPHEN (TYLENOL) 500 MG TABLET    Take 500 mg by mouth every 6 (six) hours as needed.    AMLODIPINE (NORVASC) 5 MG TABLET    Take 1 tablet (5 mg total) by mouth once daily.    CHOLECALCIFEROL, VITAMIN D3, (VITAMIN D3) 25 MCG (1,000 UNIT) CAPSULE    Take 2 capsules (2,000 Units total) by mouth once daily.    CLONIDINE (CATAPRES) 0.1 MG TABLET    TAKE 1 TABLET BY MOUTH ONCE DAILY.    DICLOFENAC SODIUM (VOLTAREN) 1 % GEL    Apply 2 g topically 4 (four) times daily.    FLUOXETINE 10 MG CAPSULE    Take 1 capsule (10 mg total) by mouth once daily.    LANSOPRAZOLE (PREVACID) 30 MG CAPSULE    Take 1 capsule (30 mg total) by mouth once daily.    METOPROLOL SUCCINATE (TOPROL-XL) 25 MG 24 HR TABLET    Take 1 tablet (25 mg total) by mouth once daily.     Review of patient's allergies indicates:   Allergen Reactions    Adhesive Swelling     Swelling x2 with flu shot and band-aid.  Swells in shape of band-aid.    Lisinopril Other (See Comments)     Cough    Latex, natural rubber Swelling     Localized swelling at site of bandaid/adhesive       Physical Exam:   There is no height or weight on file to calculate BMI.    GENERAL: Well appearing, in no acute distress.  HEAD: Normocephalic and atraumatic.  ENT: External ears and nose grossly normal.  EYES: EOMI  bilaterally  PULMONARY: Respirations are grossly even and non-labored.  NEURO: Awake, alert, and oriented x 3.  SKIN: No obvious rashes appreciated.  PSYCH: Mood & affect are appropriate.    Detailed MSK exam:     Right knee exam:   -ROM: extension 0, flexion 110  -TTP: Medial joint line and Lateral joint line  -effusion: trace  -Patellar apprehension negative  -Hunter test negative  -stable to varus and valgus stress tests  -Lachman test negative, anterior drawer test negative, posterior drawer test negative     Left knee exam:   -ROM: extension 0, flexion 110  -TTP: Medial joint line and Lateral joint line  -effusion: trace  -Patellar apprehension negative  -Hunter test negative  -stable to varus and valgus stress tests  -Lachman test negative, anterior drawer test negative, posterior drawer test negative    Imaging:  Sports Medicine US - Guidance for Needle Placement  Skip Rich MD     6/27/2024  4:52 PM  Sports Medicine US - Guidance for Needle Placement    Date/Time: 6/27/2024 4:20 PM    Performed by: Skip Rich MD  Authorized by: Skip Rich MD  Preparation: Patient was prepped and   draped in the usual sterile fashion.  Local anesthesia used: no    Anesthesia:  Local anesthesia used: no    Sedation:  Patient sedated: no    Patient tolerance: patient tolerated the procedure well with no immediate   complications  Comments: Ultrasound guidance was used for needle localization. Images   were saved and stored for documentation. The appropriate structures were   visualized. Dynamic visualization of the needle was continuous throughout   the procedures and maintained good position.         Relevant imaging results were reviewed and interpreted by me and per my read shows severe arthritic changes bilaterally.  This was discussed with the patient and / or family today.     Assessment:  Mary Russo is a 54 y.o. female following up for bilateral knee pain.   Plan: patient has tried  triamcinolone and methylprednisolone steroid injections, both of which only lasted 2-4 weeks each.   Prior auth for zilretta injections. Toradol injections given today in the meantime. Patient has an appt with Dr Bedoya next week.   Follow up 4 weeks. All questions answered.     Bilateral primary osteoarthritis of knee  -     Sports Medicine US - Guidance for Needle Placement  -     Prior authorization Order  -     Large Joint Aspiration/Injection: bilateral knee         Ultrasound guidance was used for needle localization. Images were saved and stored for documentation. The appropriate structures were visualized. Dynamic visualization of the needle was continuous throughout the procedures and maintained good position.      Electronically signed:  Skip Rich MD, MPH  09/26/2024  4:36 PM

## 2024-09-26 NOTE — PATIENT INSTRUCTIONS
Assessment:  Mary Russo is a 54 y.o. female   Chief Complaint   Patient presents with    Right Knee - Pain    Left Knee - Pain       No diagnosis found.     Plan:  Ultrasound guided bilateral Toradol injections to knees  We discussed the proper protocols after the injection such as no submerging pools, baths tubs, or hot tubs for 24 hr.  Showering is okay today.   We discussed red flags such as fevers, chills, red, warm, tender joint at the area of injection to please seek medical care immediately.    Pre authorization for bilateral Zilretta injections after failing 2 different steroid injections  Follow up for Zilretta injections    Follow-up:  for Zilretta injections .    Thank you for choosing [facility] and Dr. Skip Rich for your orthopedic & sports medicine care. It is our goal to provide you with exceptional care that will help keep you healthy, active, and get you back in the game.    Please do not hesitate to reach out to us via email, phone, or MyChart with any questions, concerns, or feedback.    If you felt that you received exemplary care today, please consider leaving us feedback on [facility] at:  [email or web address]    If you are experiencing pain/discomfort ,or have questions after 5pm and would like to be connected to the [facility] on-call team, please call this number and specify which Sports Medicine provider is treating you: [phone]

## 2024-09-26 NOTE — PROCEDURES
Sports Medicine US - Guidance for Needle Placement    Date/Time: 9/26/2024 2:00 PM    Performed by: Skip Rich MD  Authorized by: Skip Rich MD  Preparation: Patient was prepped and draped in the usual sterile fashion.  Local anesthesia used: no    Anesthesia:  Local anesthesia used: no    Sedation:  Patient sedated: no    Patient tolerance: patient tolerated the procedure well with no immediate complications  Comments: Ultrasound guidance was used for needle localization. Images were saved and stored for documentation. The appropriate structures were visualized. Dynamic visualization of the needle was continuous throughout the procedures and maintained good position.

## 2024-09-26 NOTE — PROCEDURES
Large Joint Aspiration/Injection: bilateral knee    Date/Time: 9/26/2024 2:00 PM    Performed by: Skip Rich MD  Authorized by: Skip Rich MD    Consent Done?:  Yes (Verbal)  Indications:  Arthritis and pain  Site marked: the procedure site was marked    Timeout: prior to procedure the correct patient, procedure, and site was verified    Prep: patient was prepped and draped in usual sterile fashion    Local anesthetic:  Bupivacaine 0.5% without epinephrine and lidocaine 1% without epinephrine    Details:  Needle Size:  21 G  Ultrasonic Guidance for needle placement?: Yes    Images are saved and documented.  Approach:  Lateral (superior)  Location:  Knee  Laterality:  Bilateral  Site:  Bilateral knee  Medications (Right):  30 mg ketorolac 60 mg/2 mL  Medications (Left):  30 mg ketorolac 60 mg/2 mL  Patient tolerance:  Patient tolerated the procedure well with no immediate complications     Ultrasound guidance was used for needle localization. Images were saved and stored for documentation. The appropriate structures were visualized. Dynamic visualization of the needle was continuous throughout the procedures and maintained good position.

## 2024-09-27 ENCOUNTER — TELEPHONE (OUTPATIENT)
Dept: ORTHOPEDICS | Facility: CLINIC | Age: 54
End: 2024-09-27
Payer: COMMERCIAL

## 2024-09-27 NOTE — TELEPHONE ENCOUNTER
Patient would like to come in to get Bilateral knee CSI,   Prior auth for zilretta can not be re initiated until after 6 months of being denied.  Patient is aware of this.

## 2024-10-03 ENCOUNTER — HOSPITAL ENCOUNTER (OUTPATIENT)
Dept: RADIOLOGY | Facility: HOSPITAL | Age: 54
Discharge: HOME | End: 2024-10-03
Attending: ORTHOPAEDIC SURGERY
Payer: COMMERCIAL

## 2024-10-03 ENCOUNTER — OFFICE VISIT (OUTPATIENT)
Dept: SPORTS MEDICINE | Facility: CLINIC | Age: 54
End: 2024-10-03
Payer: COMMERCIAL

## 2024-10-03 ENCOUNTER — OFFICE VISIT (OUTPATIENT)
Dept: ORTHOPEDICS | Facility: CLINIC | Age: 54
End: 2024-10-03
Payer: COMMERCIAL

## 2024-10-03 VITALS
HEIGHT: 66 IN | WEIGHT: 259.25 LBS | HEIGHT: 66 IN | BODY MASS INDEX: 42.59 KG/M2 | BODY MASS INDEX: 41.66 KG/M2 | WEIGHT: 265 LBS

## 2024-10-03 DIAGNOSIS — M17.0 BILATERAL PRIMARY OSTEOARTHRITIS OF KNEE: ICD-10-CM

## 2024-10-03 DIAGNOSIS — M17.12 ARTHRITIS OF KNEE, LEFT: ICD-10-CM

## 2024-10-03 DIAGNOSIS — E66.01 MORBID OBESITY WITH BMI OF 40.0-44.9, ADULT: ICD-10-CM

## 2024-10-03 DIAGNOSIS — M17.0 PRIMARY OSTEOARTHRITIS OF BOTH KNEES: ICD-10-CM

## 2024-10-03 DIAGNOSIS — M21.161 ACQUIRED VARUS DEFORMITY KNEE, RIGHT: ICD-10-CM

## 2024-10-03 DIAGNOSIS — M17.11 ARTHRITIS OF KNEE, RIGHT: Primary | ICD-10-CM

## 2024-10-03 DIAGNOSIS — M21.162 ACQUIRED VARUS DEFORMITY KNEE, LEFT: ICD-10-CM

## 2024-10-03 DIAGNOSIS — M17.0 BILATERAL PRIMARY OSTEOARTHRITIS OF KNEE: Primary | ICD-10-CM

## 2024-10-03 PROCEDURE — 1159F MED LIST DOCD IN RCRD: CPT | Mod: CPTII,S$GLB,, | Performed by: STUDENT IN AN ORGANIZED HEALTH CARE EDUCATION/TRAINING PROGRAM

## 2024-10-03 PROCEDURE — 99213 OFFICE O/P EST LOW 20 MIN: CPT | Mod: 25,S$GLB,, | Performed by: STUDENT IN AN ORGANIZED HEALTH CARE EDUCATION/TRAINING PROGRAM

## 2024-10-03 PROCEDURE — 99999 PR PBB SHADOW E&M-EST. PATIENT-LVL IV: CPT | Mod: PBBFAC,,, | Performed by: ORTHOPAEDIC SURGERY

## 2024-10-03 PROCEDURE — 3008F BODY MASS INDEX DOCD: CPT | Mod: CPTII,S$GLB,, | Performed by: STUDENT IN AN ORGANIZED HEALTH CARE EDUCATION/TRAINING PROGRAM

## 2024-10-03 PROCEDURE — 3044F HG A1C LEVEL LT 7.0%: CPT | Mod: CPTII,S$GLB,, | Performed by: STUDENT IN AN ORGANIZED HEALTH CARE EDUCATION/TRAINING PROGRAM

## 2024-10-03 PROCEDURE — 20611 DRAIN/INJ JOINT/BURSA W/US: CPT | Mod: 50,S$GLB,, | Performed by: STUDENT IN AN ORGANIZED HEALTH CARE EDUCATION/TRAINING PROGRAM

## 2024-10-03 PROCEDURE — 73564 X-RAY EXAM KNEE 4 OR MORE: CPT | Mod: TC,50

## 2024-10-03 PROCEDURE — 99999 PR PBB SHADOW E&M-EST. PATIENT-LVL III: CPT | Mod: PBBFAC,,, | Performed by: STUDENT IN AN ORGANIZED HEALTH CARE EDUCATION/TRAINING PROGRAM

## 2024-10-03 PROCEDURE — 73564 X-RAY EXAM KNEE 4 OR MORE: CPT | Mod: 26,,, | Performed by: RADIOLOGY

## 2024-10-03 PROCEDURE — 1160F RVW MEDS BY RX/DR IN RCRD: CPT | Mod: CPTII,S$GLB,, | Performed by: STUDENT IN AN ORGANIZED HEALTH CARE EDUCATION/TRAINING PROGRAM

## 2024-10-03 RX ORDER — MELOXICAM 15 MG/1
15 TABLET ORAL DAILY
Qty: 90 TABLET | Refills: 3 | Status: SHIPPED | OUTPATIENT
Start: 2024-10-03

## 2024-10-03 RX ORDER — TRIAMCINOLONE ACETONIDE 40 MG/ML
40 INJECTION, SUSPENSION INTRA-ARTICULAR; INTRAMUSCULAR
Status: DISCONTINUED | OUTPATIENT
Start: 2024-10-03 | End: 2024-10-03 | Stop reason: HOSPADM

## 2024-10-03 RX ADMIN — TRIAMCINOLONE ACETONIDE 40 MG: 40 INJECTION, SUSPENSION INTRA-ARTICULAR; INTRAMUSCULAR at 02:10

## 2024-10-03 NOTE — PATIENT INSTRUCTIONS
X-ray show complete loss of joint space on the inside of her knees with bone spurs and we call this varus deformity that means you are bowlegged  You have fairly decent exercise program that you do which is low-impact stationary bike water exercise   You already been through physical therapy at [facility] [place] as well as aquatic therapy  You tried Celebrex and you using topicals and it is not helping as much   I will switch you to meloxicam 15 mg once a day with food  You can still take Tylenol arthritis maximum 3 times a day with the meloxicam if needed  You received multiple injections of steroid of Kenalog and Depo-Medrol as well as you received gel injections/Euflexxa  You had been denied Zilretta which is the slow release steroid injection by the insurance  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  Surgery roughly an hour and a half done under general anesthetic or spinal.  Once you are awake in recovery room we get you up with the physical therapist and walk you around.  We will give you a walker.  We will start home physical therapy for 1st 2 weeks and after 2 weeks you will start outpatient physical therapy.  Total knee replacement is considered 80% successful in decreasing pain and increasing function it is not perfect.  It should last on the average 15 years because the plastic component wears out with time.  Doing bilateral knee replacement at the same time carries a higher risk of mortality of around 2%.  Doing 1 at a  time it is half a%  It will take roughly 3-6 months for complete healing to occur.  Total knee replacement will improve with time up to 18 months after surgery  Usually people stay out of work for 3 months   Procedure, common risks and benefits,alternatives discussed in details.All questions answered.Patient expressed understanding.Patient instructed to call for any questions that could arise in the future.    Most common Risks:  Infection/less than 2%  Leg-length discrepancy/we are going to straighten the leg out it is not going to be bowlegged anymore    Neuro-vascular injury ( resulting in loss of motor and sensory functions)/almost all total knee replacements are slightly numb on the outside of the knee.  Very rarely we get somebody who has a footdrop.  80% of foot drops recuperate within 6 months to a year in other words they can not bring the foot down but they can not bring it  Pain  Blood clot  Fat clot  Loss of motion/we heal with scar tissue your job is to do extensive exercise and therapy even though you hurt so you do not scar down and you lose motion  Fracture of bone  Failure of procedure to achieve its intended purpose/total knee replacement is not perfect it is 80% successful in decreasing pain increasing function.  You might still feeling a little bit achiness here and there with weather changes.  You might feel some clicking noises in the knee also  Failure of hardware/total knee replacement made a metal and plastic it should last on the average 15 years  Non-union or mal-union of bone  Malalignment  Death/everybody must undergo cardiac clearance before I will do the surgery  Return in 3 months I will give you a brochure    The right knee is the 1 that hurts her the most  Not allergic to metal you can wear fake jewelry without any problems

## 2024-10-03 NOTE — PROCEDURES
Large Joint Aspiration/Injection: bilateral knee    Date/Time: 10/3/2024 2:00 PM    Performed by: Skip Rich MD  Authorized by: Skip Rich MD    Consent Done?:  Yes (Verbal)  Indications:  Arthritis and pain  Site marked: the procedure site was marked    Timeout: prior to procedure the correct patient, procedure, and site was verified    Prep: patient was prepped and draped in usual sterile fashion    Local anesthetic:  Bupivacaine 0.5% without epinephrine and lidocaine 1% without epinephrine    Details:  Needle Size:  21 G  Ultrasonic Guidance for needle placement?: Yes    Images are saved and documented.  Approach:  Lateral (superior)  Location:  Knee  Laterality:  Bilateral  Site:  Bilateral knee  Medications (Right):  40 mg triamcinolone acetonide 40 mg/mL  Medications (Left):  40 mg triamcinolone acetonide 40 mg/mL  Patient tolerance:  Patient tolerated the procedure well with no immediate complications     Ultrasound guidance was used for needle localization. Images were saved and stored for documentation. The appropriate structures were visualized. Dynamic visualization of the needle was continuous throughout the procedures and maintained good position.

## 2024-10-03 NOTE — PROGRESS NOTES
Subjective:     Patient ID: Mary Russo is a 54 y.o. female.    Chief Complaint: Pain and Swelling of the Right Knee and Pain of the Left Knee    HPI:  Bilateral knee pain with the right worse than the left   10/03/2024   Patient stated started with pain around 7 years ago.  She has seen by an orthopedic surgeon who told her she has to lose a lot of weight and to come and see him when she is 62 years of age.  Stated of the last 3 years things got worse and had received numerous treatment in the form of Celebrex which is not helping Voltaren gel which does not help as much as compound cream, received numerous injections of Kenalog as well as Depo-Medrol.  She was denied Zilretta by the insurance requiring to wait 6 months after the last injection.  She has been through physical therapy at [facility] and also underwent aquatic therapy.  She does go 3 to 4 times a week to the gym to do aquatic exercise program.  She does take Tylenol arthritis 2 tablets twice a day as needed.  At work at the [employer] she has a desk and she has a under the desk pedals and exercises and gets up and walk around.  IO vera was not approved by the insurance.  This is affecting activities of daily living specially the right knee is worse than the left.  She had lost around 50 lb over the years she is down to 265 lb with BMI 42.77  Her knee pain is occasionally giving her low back pain but does not have any history of injury at this time.  No fever no chills no shortness of breath or difficulty with chewing swallowing loss of bowel bladder control blurry vision double vision loss sense smell or taste or numbness or tingling in the legs at this point  Pain 5/10    Past Medical History:   Diagnosis Date    Acid reflux     Anxiety     Arthritis     Carpal tunnel syndrome of right wrist     Cholelithiases     Hypertension     Iron deficiency     Morbid obesity      Past Surgical History:   Procedure Laterality Date     CHOLECYSTECTOMY      ESOPHAGOGASTRODUODENOSCOPY N/A 10/14/2019    Procedure: ESOPHAGOGASTRODUODENOSCOPY (EGD);  Surgeon: Stefanie Monique MD;  Location: [facility];  Service: Endoscopy;  Laterality: N/A;    ESOPHAGOGASTRODUODENOSCOPY N/A 12/23/2019    Procedure: EGD (ESOPHAGOGASTRODUODENOSCOPY);  Surgeon: Stefanie Monique MD;  Location: [facility];  Service: Endoscopy;  Laterality: N/A;    EYE SURGERY  02/2018    Lasik    HERNIA REPAIR      LAPAROSCOPIC GASTRIC BANDING  10/31/2017    removed    REFRACTIVE SURGERY      TUBAL LIGATION       Family History   Problem Relation Name Age of Onset    Cancer Mother Rosie 49        breast cancer    Heart disease Mother Rosie     Diabetes Mother Rosie     Stroke Mother Rosie     Obesity Mother [place]     Breast cancer Mother Rosie     Hypertension Mother Rosie     Kidney disease Mother Rosie     Heart disease Father meagan     Obesity Father meagan     Obesity Sister      Obesity Maternal Grandmother      Obesity Sister      Colon cancer Neg Hx      Ovarian cancer Neg Hx       Social History     Socioeconomic History    Marital status:     Number of children: 3   Occupational History     Employer: [employer]   Tobacco Use    Smoking status: Never    Smokeless tobacco: Never   Substance and Sexual Activity    Alcohol use: Not Currently     Alcohol/week: 1.0 standard drink of alcohol     Types: 1 Glasses of wine per week     Comment: occasionally    Drug use: Never    Sexual activity: Yes     Partners: Male     Birth control/protection: Coitus interruptus, Other-see comments     Comment: tubal     Social Drivers of Health     Financial Resource Strain: Low Risk  (11/13/2023)    Overall Financial Resource Strain (CARDIA)     Difficulty of Paying Living Expenses: Not hard at all   Food Insecurity: No Food Insecurity (11/13/2023)    Hunger Vital Sign     Worried About Running Out of Food in the Last Year: Never true     Ran Out  of Food in the Last Year: Never true   Transportation Needs: No Transportation Needs (11/13/2023)    PRAPARE - Transportation     Lack of Transportation (Medical): No     Lack of Transportation (Non-Medical): No   Physical Activity: Insufficiently Active (11/13/2023)    Exercise Vital Sign     Days of Exercise per Week: 2 days     Minutes of Exercise per Session: 50 min   Stress: No Stress Concern Present (11/13/2023)    [place] [employer] - Occupational Stress Questionnaire     Feeling of Stress : Only a little   Housing Stability: Low Risk  (11/13/2023)    Housing Stability Vital Sign     Unable to Pay for Housing in the Last Year: No     Number of Places Lived in the Last Year: 1     Unstable Housing in the Last Year: No     Medication List with Changes/Refills   New Medications    MELOXICAM (MOBIC) 15 MG TABLET    Take 1 tablet (15 mg total) by mouth once daily. Take with food   Current Medications    ACETAMINOPHEN (TYLENOL) 500 MG TABLET    Take 500 mg by mouth every 6 (six) hours as needed.    AMLODIPINE (NORVASC) 5 MG TABLET    Take 1 tablet (5 mg total) by mouth once daily.    CHOLECALCIFEROL, VITAMIN D3, (VITAMIN D3) 25 MCG (1,000 UNIT) CAPSULE    Take 2 capsules (2,000 Units total) by mouth once daily.    CLONIDINE (CATAPRES) 0.1 MG TABLET    TAKE 1 TABLET BY MOUTH ONCE DAILY.    DICLOFENAC SODIUM (VOLTAREN) 1 % GEL    Apply 2 g topically 4 (four) times daily.    FLUOXETINE 10 MG CAPSULE    Take 1 capsule (10 mg total) by mouth once daily.    LANSOPRAZOLE (PREVACID) 30 MG CAPSULE    Take 1 capsule (30 mg total) by mouth once daily.    METOPROLOL SUCCINATE (TOPROL-XL) 25 MG 24 HR TABLET    Take 1 tablet (25 mg total) by mouth once daily.     Review of patient's allergies indicates:   Allergen Reactions    Adhesive Swelling     Swelling x2 with flu shot and band-aid.  Swells in shape of band-aid.    Lisinopril Other (See Comments)     Cough    Latex, natural rubber Swelling      Localized swelling at site of bandaid/adhesive     Review of Systems   Constitutional: Negative for decreased appetite.   HENT:  Negative for tinnitus.    Eyes:  Negative for double vision.   Cardiovascular:  Negative for chest pain.   Respiratory:  Negative for wheezing.    Hematologic/Lymphatic: Negative for bleeding problem.   Skin:  Negative for dry skin.   Musculoskeletal:  Positive for arthritis, joint pain, joint swelling and stiffness. Negative for back pain, gout and neck pain.   Gastrointestinal:  Negative for abdominal pain.   Genitourinary:  Negative for bladder incontinence.   Neurological:  Negative for numbness, paresthesias and sensory change.   Psychiatric/Behavioral:  Negative for altered mental status.        Objective:   Body mass index is 42.77 kg/m².  There were no vitals filed for this visit.       General    Constitutional: She is oriented to person, place, and time. She appears well-developed.   HENT:   Head: Atraumatic.   Eyes: EOM are normal.   Pulmonary/Chest: Effort normal.   Neurological: She is alert and oriented to person, place, and time.   Psychiatric: Judgment normal.             Ambulating without assistive devices with a slight limp   Pelvis is level   Bilateral hips full motion no pain in the groin.  No pain over the greater troch  Hip flexors and abductors and adductors and quads and hamstrings and ankle extensors and flexors are 5/5 in even bilaterally   Right knee with crepitus to compression on the patella and severe pain.  There is pain medially to palpation.  There is varus deformity of around 7°.  Active motion 0-115°.  No defect in the patella or quadriceps tendon.  Mild to moderate swelling.  Stable to varus and valgus stressing in anterior drawer  Left knee with crepitus to compression on the patella moderate discomfort.  There is pain medially to palpation.  Varus deformity also around 7°.  Active motion 0-125 degrees.  No defect in the patella or quadriceps tendon.   Mild swelling.  Stable to varus and valgus stressing and anterior drawer is negative   Calves are soft nontender with very mild varicosities   Ankle motion is intact able to bring the ankle up and down and strength is 5/5 with the extension and flexion  Skin is warm to touch no obvious lesions.      Relevant imaging results reviewed and interpreted by me, discussed with the patient and / or family today   X-ray 10/03/2024 bilateral knees AP standing standing with flexion laterals and sunrise.  Complete loss of medial joint space with marginal osteophytic changes with also severe arthritic changes underneath the patella bilaterally.  There is varus deformity.  Findings consistent with arthritis.  No fracture seen  Assessment:     Encounter Diagnoses   Name Primary?    Arthritis of knee, right Yes    Arthritis of knee, left     Acquired varus deformity knee, right     Acquired varus deformity knee, left     Bilateral primary osteoarthritis of knee     Morbid obesity with BMI of 40.0-44.9, adult         Plan:   Arthritis of knee, right  -     meloxicam (MOBIC) 15 MG tablet; Take 1 tablet (15 mg total) by mouth once daily. Take with food  Dispense: 90 tablet; Refill: 3    Arthritis of knee, left  -     meloxicam (MOBIC) 15 MG tablet; Take 1 tablet (15 mg total) by mouth once daily. Take with food  Dispense: 90 tablet; Refill: 3    Acquired varus deformity knee, right    Acquired varus deformity knee, left    Bilateral primary osteoarthritis of knee  -     Ambulatory referral/consult to Orthopedics    Morbid obesity with BMI of 40.0-44.9, adult         Patient Instructions   X-ray show complete loss of joint space on the inside of her knees with bone spurs and we call this varus deformity that means you are bowlegged  You have fairly decent exercise program that you do which is low-impact stationary bike water exercise   You already been through physical therapy at [facility] on [facility] [place] as well as aquatic therapy  You  tried Celebrex and you using topicals and it is not helping as much   I will switch you to meloxicam 15 mg once a day with food  You can still take Tylenol arthritis maximum 3 times a day with the meloxicam if needed  You received multiple injections of steroid of Kenalog and Depo-Medrol as well as you received gel injections/Euflexxa  You had been denied Zilretta which is the slow release steroid injection by the insurance  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  Surgery roughly an hour and a half done under general anesthetic or spinal.  Once you are awake in recovery room we get you up with the physical therapist and walk you around.  We will give you a walker.  We will start home physical therapy for 1st 2 weeks and after 2 weeks you will start outpatient physical therapy.  Total knee replacement is considered 80% successful in decreasing pain and increasing function it is not perfect.  It should last on the average 15 years because the plastic component wears out with time.  Doing bilateral knee replacement at the same time carries a higher risk of mortality of around 2%.  Doing 1 at a time it is half a%  It will take roughly 3-6 months for complete healing to occur.  Total knee replacement will improve with time up to 18 months after surgery  Usually people stay out of work for 3 months   Procedure, common risks and benefits,alternatives discussed in details.All questions answered.Patient expressed understanding.Patient instructed  to call for any questions that could arise in the future.    Most common Risks:  Infection/less than 2%  Leg-length discrepancy/we are going to straighten the leg out it is not going to be bowlegged anymore    Neuro-vascular injury ( resulting in loss of motor and sensory functions)/almost all total knee replacements are slightly numb on the outside of the knee.  Very rarely we get somebody who has a footdrop.  80% of foot drops recuperate within 6 months to a year in other words they can not bring the foot down but they can not bring it  Pain  Blood clot  Fat clot  Loss of motion/we heal with scar tissue your job is to do extensive exercise and therapy even though you hurt so you do not scar down and you lose motion  Fracture of bone  Failure of procedure to achieve its intended purpose/total knee replacement is not perfect it is 80% successful in decreasing pain increasing function.  You might still feeling a little bit achiness here and there with weather changes.  You might feel some clicking noises in the knee also  Failure of hardware/total knee replacement made a metal and plastic it should last on the average 15 years  Non-union or mal-union of bone  Malalignment  Death/everybody must undergo cardiac clearance before I will do the surgery  Return in 3 months I will give you a brochure    The right knee is the 1 that hurts her the most  Not allergic to metal you can wear fake jewelry without any problems    The right knee is the 1 that hurts the most.  She is contemplating 1st part of next year to undergo total knee replacement.  If he she can hold off any longer she will try.  Will try meloxicam to see if that helps.  Went over pros and cons of surgery in details.  We will discuss much more in the future and if she has any questions will answered it.  We did discuss getting cardiac clearance and we will try to get her to see cardiologist within the next 10 weeks.  She did receive steroid injections today by  another provider and she needs to wait 3 months before undergoing total knee because the risk of infection will go up  She would need a walker after surgery after surgery       The mobility limitation can not be sufficiently resolved by the use of a cane.  Patient's functional mobility deficit can be sufficiently resolved with the use of a rolling walker.  Patient has mobility limitation significantly impairs their ability to participate in 1 or more activities of daily living.  The use of a rolling walker we will significantly improve the patient's ability to participate in  [facility] and it is to be used on a regular basis in the home.              Disclaimer: This note was prepared using a voice recognition system and is likely to have sound alike errors within the text.

## 2024-10-03 NOTE — PATIENT INSTRUCTIONS
Assessment:  Mary Russo is a 54 y.o. female No chief complaint on file.      Encounter Diagnosis   Name Primary?    Bilateral primary osteoarthritis of knee Yes        Plan:  Ultrasound guided cortisone injection to bilateral knees  We discussed the proper protocols after the injection such as no submerging pools, baths tubs, or hot tubs for 24 hr.  Showering is okay today.  We also discussed that blood sugars can be elevated after an injection and asked patient to properly checked her sugars over the next few days and contact their PCP if there are any concerns.  We discussed red flags such as fevers, chills, red, warm, tender joint at the area of injection to please seek medical care immediately.    Follow up with Dr. Bedoya    Follow-up: with Dr. Bedoya.    Thank you for choosing [facility] and Dr. Skip Rich for your orthopedic & sports medicine care. It is our goal to provide you with exceptional care that will help keep you healthy, active, and get you back in the game.    Please do not hesitate to reach out to us via email, phone, or MyChart with any questions, concerns, or feedback.    If you felt that you received exemplary care today, please consider leaving us feedback on [employer] at:  [email or web address]    If you are experiencing pain/discomfort ,or have questions after 5pm and would like to be connected to the [facility] on-call team, please call this number and specify which Sports Medicine provider is treating you: [phone]

## 2024-10-03 NOTE — PROGRESS NOTES
Patient ID: Mary Russo  YOB: 1970  MRN: [id number]    Chief Complaint: Pain of the Left Knee and Pain of the Right Knee        History of Present Illness: Mary Russo is a right-hand dominant 54 y.o. female who presents today for followup bilateral knee pain. Having significant pain in both knees today, IOVERA was denied and Zilretta was denied by her insurance. She has tried and failed  numerous treatments.  Her knees  still  have an dull ache and popping. Has to stand and wait awhile before taking a step.  Her last injection was  methylPREDNISolone in June,  lasting about 2 weeks of pain relief from each of them.  No new falls or injuries. She is requesting bilateral Cortizone injections today.  Her pain is 9/10 on pain scale.      09/26/2024 Interval History of Present Illness: Mary Russo is a right-hand dominant 54 y.o. female who presents today for followup bilateral knee pain. Having significant pain in both knees today, IOVERA was denied and Zilretta was denied by her insurance. She was told by her insurance company that she needed to try and fail at least 2 steroid injections.  Her knees  still  have an dull ache and popping. Has to stand and wait awhile before taking a step.  She has tried  methylPREDNISolone in June and kenalog in March  with about 2 weeks of pain relief from each of them.  No new falls or injuries. She is here to today to discuss her next step in pain management , Her pain is 9/10 on pain scale.     06/27/2024 Interval History of Present Illness: Mary Russo is a right-hand dominant 54 y.o. female who presents today for followup bilateral knee pain. Having significant pain in both knees today, IOVERA was denied and Zilretta was denied by her insurance. She was told by her insurance company that she needed to try and fail at least 2 steroid injections.  No new falls or injuries. She is here to today to try a different CSI in  both knees. Her pain is 9/10 on pain scale. She is scheduled to go to [place] for work at the beginning of July, she is hoping that steroid today gives much relief.      The patient is active in none.        Past Medical History:   Past Medical History:   Diagnosis Date    Acid reflux     Anxiety     Arthritis     Carpal tunnel syndrome of right wrist     Cholelithiases     Hypertension     Iron deficiency     Morbid obesity      Past Surgical History:   Procedure Laterality Date    CHOLECYSTECTOMY      ESOPHAGOGASTRODUODENOSCOPY N/A 10/14/2019    Procedure: ESOPHAGOGASTRODUODENOSCOPY (EGD);  Surgeon: Stefanie Monique MD;  Location: [facility];  Service: Endoscopy;  Laterality: N/A;    ESOPHAGOGASTRODUODENOSCOPY N/A 12/23/2019    Procedure: EGD (ESOPHAGOGASTRODUODENOSCOPY);  Surgeon: Stefanie Monique MD;  Location: [facility];  Service: Endoscopy;  Laterality: N/A;    EYE SURGERY  02/2018    Lasik    HERNIA REPAIR      LAPAROSCOPIC GASTRIC BANDING  10/31/2017    removed    REFRACTIVE SURGERY      TUBAL LIGATION       Family History   Problem Relation Name Age of Onset    Cancer Mother Rosie 49        breast cancer    Heart disease Mother Rosie     Diabetes Mother Rosie     Stroke Mother [place]     Obesity Mother Rosie     Breast cancer Mother Rosie     Hypertension Mother Rosie     Kidney disease Mother Rosie     Heart disease Father meagan     Obesity Father meagan     Obesity Sister      Obesity Maternal Grandmother      Obesity Sister      Colon cancer Neg Hx      Ovarian cancer Neg Hx       Social History     Socioeconomic History    Marital status:     Number of children: 3   Occupational History     Employer: [employer]   Tobacco Use    Smoking status: Never    Smokeless tobacco: Never   Substance and Sexual Activity    Alcohol use: Not Currently     Alcohol/week: 1.0 standard drink of alcohol     Types: 1 Glasses of wine per week     Comment: occasionally     Drug use: Never    Sexual activity: Yes     Partners: Male     Birth control/protection: Coitus interruptus, Other-see comments     Comment: tubal     Social Drivers of Health     Financial Resource Strain: Low Risk  (11/13/2023)    Overall Financial Resource Strain (CARDIA)     Difficulty of Paying Living Expenses: Not hard at all   Food Insecurity: No Food Insecurity (11/13/2023)    Hunger Vital Sign     Worried About Running Out of Food in the Last Year: Never true     Ran Out of Food in the Last Year: Never true   Transportation Needs: No Transportation Needs (11/13/2023)    PRAPARE - Transportation     Lack of Transportation (Medical): No     Lack of Transportation (Non-Medical): No   Physical Activity: Insufficiently Active (11/13/2023)    Exercise Vital Sign     Days of Exercise per Week: 2 days     Minutes of Exercise per Session: 50 min   Stress: No Stress Concern Present (11/13/2023)    [employer] [place] [employer] - Occupational Stress Questionnaire     Feeling of Stress : Only a little   Housing Stability: Low Risk  (11/13/2023)    Housing Stability Vital Sign     Unable to Pay for Housing in the Last Year: No     Number of Places Lived in the Last Year: 1     Unstable Housing in the Last Year: No     Medication List with Changes/Refills   Current Medications    ACETAMINOPHEN (TYLENOL) 500 MG TABLET    Take 500 mg by mouth every 6 (six) hours as needed.    AMLODIPINE (NORVASC) 5 MG TABLET    Take 1 tablet (5 mg total) by mouth once daily.    CHOLECALCIFEROL, VITAMIN D3, (VITAMIN D3) 25 MCG (1,000 UNIT) CAPSULE    Take 2 capsules (2,000 Units total) by mouth once daily.    CLONIDINE (CATAPRES) 0.1 MG TABLET    TAKE 1 TABLET BY MOUTH ONCE DAILY.    DICLOFENAC SODIUM (VOLTAREN) 1 % GEL    Apply 2 g topically 4 (four) times daily.    FLUOXETINE 10 MG CAPSULE    Take 1 capsule (10 mg total) by mouth once daily.    LANSOPRAZOLE (PREVACID) 30 MG CAPSULE    Take 1 capsule (30 mg total) by mouth once  daily.    METOPROLOL SUCCINATE (TOPROL-XL) 25 MG 24 HR TABLET    Take 1 tablet (25 mg total) by mouth once daily.     Review of patient's allergies indicates:   Allergen Reactions    Adhesive Swelling     Swelling x2 with flu shot and band-aid.  Swells in shape of band-aid.    Lisinopril Other (See Comments)     Cough    Latex, natural rubber Swelling     Localized swelling at site of bandaid/adhesive       Physical Exam:   Body mass index is 41.85 kg/m².    GENERAL: Well appearing, in no acute distress.  HEAD: Normocephalic and atraumatic.  ENT: External ears and nose grossly normal.  EYES: EOMI bilaterally  PULMONARY: Respirations are grossly even and non-labored.  NEURO: Awake, alert, and oriented x 3.  SKIN: No obvious rashes appreciated.  PSYCH: Mood & affect are appropriate.    Detailed MSK exam:     Right knee exam:   -ROM: extension 0, flexion 110  -TTP: Medial joint line and Lateral joint line  -effusion: trace  -Patellar apprehension negative  -Hunter test negative  -stable to varus and valgus stress tests  -Lachman test negative, anterior drawer test negative, posterior drawer test negative     Left knee exam:   -ROM: extension 0, flexion 110  -TTP: Medial joint line and Lateral joint line  -effusion: trace  -Patellar apprehension negative  -Hunter test negative  -stable to varus and valgus stress tests  -Lachman test negative, anterior drawer test negative, posterior drawer test negative    Imaging:  Sports Medicine US - Guidance for Needle Placement  Skip Rich MD     9/26/2024  2:48 PM  Sports Medicine US - Guidance for Needle Placement    Date/Time: 9/26/2024 2:00 PM    Performed by: Skip Rich MD  Authorized by: Skip Rich MD  Preparation: Patient was prepped and   draped in the usual sterile fashion.  Local anesthesia used: no    Anesthesia:  Local anesthesia used: no    Sedation:  Patient sedated: no    Patient tolerance: patient tolerated the procedure well with no  immediate   complications  Comments: Ultrasound guidance was used for needle localization. Images   were saved and stored for documentation. The appropriate structures were   visualized. Dynamic visualization of the needle was continuous throughout   the procedures and maintained good position.         Relevant imaging results were reviewed and interpreted by me and per my read shows severe arthritic changes bilaterally.  This was discussed with the patient and / or family today.     Assessment:  Mary Russo is a 54 y.o. female following up for bilateral knee pain.   Plan: patient has tried triamcinolone and methylprednisolone steroid injections, both of which only lasted 2-4 weeks each.   Steroid injection given today (see separate procedure note for details). We discussed the proper protocols after the injection such as no submerging pools, baths tubs, or hot tubs for 24 hr.  Showering is okay today.  We also discussed that blood sugars can be elevated after an injection and asked patient to properly checked her sugars over the next few days and contact their PCP if there are any concerns.  We discussed red flags such as fevers, chills, red, warm, tender joint at the area of injection to please seek medical care immediately.   Zilretta injections denied again. Patient has visit with Dr Bedoya today as well. She plans to maybe do surgery for right knee after the new year.    Follow up with Dr Bedoya. All questions answered.     Bilateral primary osteoarthritis of knee  -     Sports Medicine US - Guidance for Needle Placement  -     Large Joint Aspiration/Injection: bilateral knee         Ultrasound guidance was used for needle localization. Images were saved and stored for documentation. The appropriate structures were visualized. Dynamic visualization of the needle was continuous throughout the procedures and maintained good position.      Electronically signed:  Skip Rich MD,  MPH  10/03/2024  4:36 PM        History of Present Illness: Mary Russo is a right-hand dominant 53 y.o. female who presents for follow up s/p  bilateral knee pain. Right knee CSI 9/27/2023, worked well She reports right knee popping for the last week. Water aerobics does help, she has not been to the gym in a couple of days but she does intend to start going to water aerobic soon.  Sitting to standing her pain is 7/10,  She is here today for CSI in both knee.     The patient is active in none.  Occupation: Human resources      Past Medical History:   Past Medical History:   Diagnosis Date    Acid reflux     Anxiety     Arthritis     Carpal tunnel syndrome of right wrist     Cholelithiases     Hypertension     Iron deficiency     Morbid obesity      Past Surgical History:   Procedure Laterality Date    CHOLECYSTECTOMY      ESOPHAGOGASTRODUODENOSCOPY N/A 10/14/2019    Procedure: ESOPHAGOGASTRODUODENOSCOPY (EGD);  Surgeon: Stefanie Monique MD;  Location: [facility];  Service: Endoscopy;  Laterality: N/A;    ESOPHAGOGASTRODUODENOSCOPY N/A 12/23/2019    Procedure: EGD (ESOPHAGOGASTRODUODENOSCOPY);  Surgeon: Stefanie Monique MD;  Location: [facility];  Service: Endoscopy;  Laterality: N/A;    EYE SURGERY  02/2018    Lasik    HERNIA REPAIR      LAPAROSCOPIC GASTRIC BANDING  10/31/2017    removed    REFRACTIVE SURGERY      TUBAL LIGATION       Family History   Problem Relation Name Age of Onset    Cancer Mother Rosie 49        breast cancer    Heart disease Mother Rosie     Diabetes Mother Rosie     Stroke Mother Rosie     Obesity Mother Rosie     Breast cancer Mother Rosie     Hypertension Mother Rosie     Kidney disease Mother Rosie     Heart disease Father meagan     Obesity Father meagan     Obesity Sister      Obesity Maternal Grandmother      Obesity Sister      Colon cancer Neg Hx      Ovarian cancer Neg Hx       Social History     Socioeconomic History    Marital status:     Number of  children: 3   Occupational History     Employer: [employer]   Tobacco Use    Smoking status: Never    Smokeless tobacco: Never   Substance and Sexual Activity    Alcohol use: Not Currently     Alcohol/week: 1.0 standard drink of alcohol     Types: 1 Glasses of wine per week     Comment: occasionally    Drug use: Never    Sexual activity: Yes     Partners: Male     Birth control/protection: Coitus interruptus, Other-see comments     Comment: tubal     Social Drivers of Health     Financial Resource Strain: Low Risk  (11/13/2023)    Overall Financial Resource Strain (CARDIA)     Difficulty of Paying Living Expenses: Not hard at all   Food Insecurity: No Food Insecurity (11/13/2023)    Hunger Vital Sign     Worried About Running Out of Food in the Last Year: Never true     Ran Out of Food in the Last Year: Never true   Transportation Needs: No Transportation Needs (11/13/2023)    PRAPARE - Transportation     Lack of Transportation (Medical): No     Lack of Transportation (Non-Medical): No   Physical Activity: Insufficiently Active (11/13/2023)    Exercise Vital Sign     Days of Exercise per Week: 2 days     Minutes of Exercise per Session: 50 min   Stress: No Stress Concern Present (11/13/2023)    [employer] [place] [employer] - Occupational Stress Questionnaire     Feeling of Stress : Only a little   Housing Stability: Low Risk  (11/13/2023)    Housing Stability Vital Sign     Unable to Pay for Housing in the Last Year: No     Number of Places Lived in the Last Year: 1     Unstable Housing in the Last Year: No     Medication List with Changes/Refills   Current Medications    ACETAMINOPHEN (TYLENOL) 500 MG TABLET    Take 500 mg by mouth every 6 (six) hours as needed.    AMLODIPINE (NORVASC) 5 MG TABLET    Take 1 tablet (5 mg total) by mouth once daily.    CHOLECALCIFEROL, VITAMIN D3, (VITAMIN D3) 25 MCG (1,000 UNIT) CAPSULE    Take 2 capsules (2,000 Units total) by mouth  once daily.    CLONIDINE (CATAPRES) 0.1 MG TABLET    TAKE 1 TABLET BY MOUTH ONCE DAILY.    DICLOFENAC SODIUM (VOLTAREN) 1 % GEL    Apply 2 g topically 4 (four) times daily.    FLUOXETINE 10 MG CAPSULE    Take 1 capsule (10 mg total) by mouth once daily.    LANSOPRAZOLE (PREVACID) 30 MG CAPSULE    Take 1 capsule (30 mg total) by mouth once daily.    METOPROLOL SUCCINATE (TOPROL-XL) 25 MG 24 HR TABLET    Take 1 tablet (25 mg total) by mouth once daily.     Review of patient's allergies indicates:   Allergen Reactions    Adhesive Swelling     Swelling x2 with flu shot and band-aid.  Swells in shape of band-aid.    Lisinopril Other (See Comments)     Cough    Latex, natural rubber Swelling     Localized swelling at site of bandaid/adhesive       Physical Exam:   Body mass index is 41.85 kg/m².    GENERAL: Well appearing, in no acute distress.  HEAD: Normocephalic and atraumatic.  ENT: External ears and nose grossly normal.  EYES: EOMI bilaterally  PULMONARY: Respirations are grossly even and non-labored.  NEURO: Awake, alert, and oriented x 3.  SKIN: No obvious rashes appreciated.  PSYCH: Mood & affect are appropriate.    Detailed MSK exam:     Right knee exam:   -ROM: extension 0, flexion 110  -TTP: Medial joint line and Lateral joint line  -effusion: trace  -Patellar apprehension negative  -Hunter test negative  -stable to varus and valgus stress tests  -Lachman test negative, anterior drawer test negative, posterior drawer test negative    Left knee exam:   -ROM: extension 0, flexion 110  -TTP: Medial joint line and Lateral joint line  -effusion: trace  -Patellar apprehension negative  -Hunter test negative  -stable to varus and valgus stress tests  -Lachman test negative, anterior drawer test negative, posterior drawer test negative      Imaging:  Sports Medicine US - Guidance for Needle Placement  Skip Rich MD     9/26/2024  2:48 PM  Sports Medicine US - Guidance for Needle Placement    Date/Time:  9/26/2024 2:00 PM    Performed by: Skip Rich MD  Authorized by: Skip Rich MD  Preparation: Patient was prepped and   draped in the usual sterile fashion.  Local anesthesia used: no    Anesthesia:  Local anesthesia used: no    Sedation:  Patient sedated: no    Patient tolerance: patient tolerated the procedure well with no immediate   complications  Comments: Ultrasound guidance was used for needle localization. Images   were saved and stored for documentation. The appropriate structures were   visualized. Dynamic visualization of the needle was continuous throughout   the procedures and maintained good position.         Relevant imaging results were reviewed and interpreted by me and per my read shows severe arthritic changes bilaterally.  This was discussed with the patient and / or family today.     Assessment:  Mary Russo is a 54 y.o. female following up for chronic bilateral knee pain. Steroid injection lasts around 3 months and is interested in repeating today.   Plan: Steroid injection given today (see separate procedure note for details). We discussed the proper protocols after the injection such as no submerging pools, baths tubs, or hot tubs for 24 hr.  Showering is okay today.  We also discussed that blood sugars can be elevated after an injection and asked patient to properly checked her sugars over the next few days and contact their PCP if there are any concerns.  We discussed red flags such as fevers, chills, red, warm, tender joint at the area of injection to please seek medical care immediately.   Consider iovera if not improving. Gel injection was ineffective in the past. PT referral at the [facility] for aquatic therapy.   Follow up as needed. All questions answered.     Bilateral primary osteoarthritis of knee  -     Sports Medicine US - Guidance for Needle Placement  -     Large Joint Aspiration/Injection: bilateral knee           Ultrasound guidance was used for needle  localization. Images were saved and stored for documentation. The appropriate structures were visualized. Dynamic visualization of the needle was continuous throughout the procedures and maintained good position.      Electronically signed:  Skip Rich MD, MPH  10/03/2024  3:32 PM

## 2024-10-03 NOTE — PROCEDURES
Sports Medicine US - Guidance for Needle Placement    Date/Time: 10/3/2024 2:00 PM    Performed by: Skip Rich MD  Authorized by: Skip Rich MD  Preparation: Patient was prepped and draped in the usual sterile fashion.  Local anesthesia used: no    Anesthesia:  Local anesthesia used: no    Sedation:  Patient sedated: no    Patient tolerance: patient tolerated the procedure well with no immediate complications  Comments: Ultrasound guidance was used for needle localization. Images were saved and stored for documentation. The appropriate structures were visualized. Dynamic visualization of the needle was continuous throughout the procedures and maintained good position.

## 2024-10-11 ENCOUNTER — PATIENT MESSAGE (OUTPATIENT)
Dept: SPORTS MEDICINE | Facility: CLINIC | Age: 54
End: 2024-10-11
Payer: COMMERCIAL

## 2024-10-16 ENCOUNTER — PATIENT MESSAGE (OUTPATIENT)
Dept: SPORTS MEDICINE | Facility: CLINIC | Age: 54
End: 2024-10-16
Payer: COMMERCIAL

## 2024-11-18 ENCOUNTER — OFFICE VISIT (OUTPATIENT)
Dept: INTERNAL MEDICINE | Facility: CLINIC | Age: 54
End: 2024-11-18
Payer: COMMERCIAL

## 2024-11-18 VITALS
DIASTOLIC BLOOD PRESSURE: 78 MMHG | BODY MASS INDEX: 43.14 KG/M2 | HEART RATE: 72 BPM | SYSTOLIC BLOOD PRESSURE: 122 MMHG | HEIGHT: 66 IN | WEIGHT: 268.44 LBS

## 2024-11-18 DIAGNOSIS — E55.9 VITAMIN D DEFICIENCY: ICD-10-CM

## 2024-11-18 DIAGNOSIS — I10 ESSENTIAL HYPERTENSION: Primary | ICD-10-CM

## 2024-11-18 DIAGNOSIS — D50.9 IRON DEFICIENCY ANEMIA, UNSPECIFIED IRON DEFICIENCY ANEMIA TYPE: ICD-10-CM

## 2024-11-18 PROCEDURE — 1159F MED LIST DOCD IN RCRD: CPT | Mod: CPTII,S$GLB,, | Performed by: FAMILY MEDICINE

## 2024-11-18 PROCEDURE — 99214 OFFICE O/P EST MOD 30 MIN: CPT | Mod: S$GLB,,, | Performed by: FAMILY MEDICINE

## 2024-11-18 PROCEDURE — G2211 COMPLEX E/M VISIT ADD ON: HCPCS | Mod: S$GLB,,, | Performed by: FAMILY MEDICINE

## 2024-11-18 PROCEDURE — 3074F SYST BP LT 130 MM HG: CPT | Mod: CPTII,S$GLB,, | Performed by: FAMILY MEDICINE

## 2024-11-18 PROCEDURE — 3078F DIAST BP <80 MM HG: CPT | Mod: CPTII,S$GLB,, | Performed by: FAMILY MEDICINE

## 2024-11-18 PROCEDURE — 1160F RVW MEDS BY RX/DR IN RCRD: CPT | Mod: CPTII,S$GLB,, | Performed by: FAMILY MEDICINE

## 2024-11-18 PROCEDURE — 99999 PR PBB SHADOW E&M-EST. PATIENT-LVL IV: CPT | Mod: PBBFAC,,, | Performed by: FAMILY MEDICINE

## 2024-11-18 PROCEDURE — 3044F HG A1C LEVEL LT 7.0%: CPT | Mod: CPTII,S$GLB,, | Performed by: FAMILY MEDICINE

## 2024-11-18 PROCEDURE — 3008F BODY MASS INDEX DOCD: CPT | Mod: CPTII,S$GLB,, | Performed by: FAMILY MEDICINE

## 2024-11-18 RX ORDER — LIDOCAINE AND PRILOCAINE 25; 25 MG/G; MG/G
CREAM TOPICAL
COMMUNITY
Start: 2024-10-28

## 2024-11-26 NOTE — PROGRESS NOTES
Subjective:       Patient ID: Mary Russo is a 54 y.o. female.    Chief Complaint: Follow-up (6 month f/u)    History of Present Illness    Patient presents to clinic today for followup of chronic conditions.  - Patient reports plans for a right knee replacement surgery scheduled for early 2025. She has an appointment with the surgeon on January 23rd to set up the surgery date. The surgeon has requested clearances from various healthcare providers prior to the procedure. Patient is agreeable to having labs done to check vitamin D and iron levels. She expresses interest in obtaining medical clearance for the knee surgery once the date is set, understanding that it needs to be done within 30 days of the procedure.  - Patient declines receiving a COVID booster vaccine and the shingles vaccine at this time, citing concerns about potential side effects and discomfort.  Patient is otherwise without concerns today.      Review of Systems   Constitutional:  Negative for chills, fatigue, fever and unexpected weight change.   Eyes:  Negative for visual disturbance.   Respiratory:  Negative for shortness of breath.    Cardiovascular:  Negative for chest pain.   Musculoskeletal:  Negative for myalgias.   Neurological:  Negative for headaches.         Objective:      Physical Exam  Vitals reviewed.   Constitutional:       General: She is not in acute distress.     Appearance: She is well-developed.   HENT:      Head: Normocephalic and atraumatic.   Eyes:      General: Lids are normal. No scleral icterus.     Extraocular Movements: Extraocular movements intact.      Conjunctiva/sclera: Conjunctivae normal.      Pupils: Pupils are equal, round, and reactive to light.   Pulmonary:      Effort: Pulmonary effort is normal.   Neurological:      Mental Status: She is alert and oriented to person, place, and time.      Cranial Nerves: No cranial nerve deficit.      Gait: Gait normal.   Psychiatric:         Mood and Affect:  Mood and affect normal.         Assessment:       1. Essential hypertension    2. Vitamin D deficiency    3. Iron deficiency anemia, unspecified iron deficiency anemia type        Plan:   1. Essential hypertension  -     Cancel: Comprehensive Metabolic Panel; Future; Expected date: 11/18/2024  -     Comprehensive Metabolic Panel; Future; Expected date: 11/18/2024    2. Vitamin D deficiency  Overview:  Continue supplement     Orders:  -     Cancel: Vitamin D; Future; Expected date: 11/18/2024  -     Vitamin D; Future; Expected date: 11/18/2024    3. Iron deficiency anemia, unspecified iron deficiency anemia type  -     Cancel: CBC Auto Differential; Future; Expected date: 11/18/2024  -     Cancel: Ferritin; Future; Expected date: 11/18/2024  -     Cancel: Iron and TIBC; Future; Expected date: 11/18/2024  -     Iron and TIBC; Future; Expected date: 11/18/2024  -     CBC Auto Differential; Future; Expected date: 11/18/2024  -     Ferritin; Future; Expected date: 11/18/2024      Assessment & Plan    RIGHT KNEE REPLACEMENT:   Discussed the patient's plan for right knee replacement surgery at the beginning of the year.   Informed the patient about the need for pre-operative clearance within 30 days of the scheduled surgery date.   Advised the patient to schedule a pre-operative clearance appointment once the surgery date is set.   Noted that the patient is scheduled to meet with the surgeon on January 23rd to determine the surgery date.    PATIENT REFUSAL OF VACCINATION:   Documented that the patient declined the COVID-19 booster and postponed the shingles vaccine for now.    HYPERTENSION:   controlled, continue current medications     BP Readings from Last 1 Encounters:   11/18/24 122/78     LABS:   Ordered routine labs, including vitamin D and iron levels, to be conducted at [facility].   Instructed the patient to contact the office if lab results are not received.       Patient expressed understanding and agreement with  plan.    Visit today included increased complexity associated with the care of the episodic problem hypertension, which was addressed while instituting co-management of the longitudinal care of the patient due to the serious and/or complex managed problem(s) .    I have evaluated and discussed management associated with medical care services that serve as the continuing focal point for all needed health care services and/or with medical care services that are part of ongoing care related to my patient's single, serious condition or a complex condition(s).    I am providing ongoing care and I am the primary care provider for this patient, and they are being managed, monitored, and/or observed for their chronic conditions over time.     I have addressed their ongoing health maintenance requirements and needs for all health care services and reviewed co-management plans provided by specialty providers when available.    Health Maintenance Due   Topic Date Due    Mammogram  01/19/2025     Health Maintenance reviewed/updated.    Follow up in about 6 months (around 5/18/2025), or if symptoms worsen or fail to improve, for annual with Taryn BURROWS, Give external lab orders.    This note was generated with the assistance of ambient listening technology. I attest to having reviewed and edited the generated note for accuracy, though some syntax or spelling errors may persist. Please contact the author of this note for any clarification.

## 2024-11-28 LAB
25(OH)D3+25(OH)D2 SERPL-MCNC: 31.1 NG/ML (ref 30–100)
ALBUMIN SERPL-MCNC: 3.7 G/DL (ref 3.8–4.9)
ALP SERPL-CCNC: 81 IU/L (ref 44–121)
ALT SERPL-CCNC: 6 IU/L (ref 0–32)
AST SERPL-CCNC: 11 IU/L (ref 0–40)
BASOPHILS # BLD AUTO: 0.1 X10E3/UL (ref 0–0.2)
BASOPHILS NFR BLD AUTO: 1 %
BILIRUB SERPL-MCNC: 0.4 MG/DL (ref 0–1.2)
BUN SERPL-MCNC: 12 MG/DL (ref 6–24)
BUN/CREAT SERPL: 14 (ref 9–23)
CALCIUM SERPL-MCNC: 10 MG/DL (ref 8.7–10.2)
CHLORIDE SERPL-SCNC: 102 MMOL/L (ref 96–106)
CO2 SERPL-SCNC: 24 MMOL/L (ref 20–29)
CREAT SERPL-MCNC: 0.85 MG/DL (ref 0.57–1)
EOSINOPHIL # BLD AUTO: 0.2 X10E3/UL (ref 0–0.4)
EOSINOPHIL NFR BLD AUTO: 3 %
ERYTHROCYTE [DISTWIDTH] IN BLOOD BY AUTOMATED COUNT: 12.2 % (ref 11.7–15.4)
EST. GFR  (NO RACE VARIABLE): 82 ML/MIN/1.73
FERRITIN SERPL-MCNC: 99 NG/ML (ref 15–150)
GLOBULIN SER CALC-MCNC: 3 G/DL (ref 1.5–4.5)
GLUCOSE SERPL-MCNC: 73 MG/DL (ref 70–99)
HCT VFR BLD AUTO: 37.1 % (ref 34–46.6)
HGB BLD-MCNC: 11.8 G/DL (ref 11.1–15.9)
IMM GRANULOCYTES # BLD AUTO: 0 X10E3/UL (ref 0–0.1)
IMM GRANULOCYTES NFR BLD AUTO: 0 %
IRON SATN MFR SERPL: 25 % (ref 15–55)
IRON SERPL-MCNC: 76 UG/DL (ref 27–159)
LYMPHOCYTES # BLD AUTO: 2.5 X10E3/UL (ref 0.7–3.1)
LYMPHOCYTES NFR BLD AUTO: 35 %
MCH RBC QN AUTO: 29.4 PG (ref 26.6–33)
MCHC RBC AUTO-ENTMCNC: 31.8 G/DL (ref 31.5–35.7)
MCV RBC AUTO: 92 FL (ref 79–97)
MONOCYTES # BLD AUTO: 0.5 X10E3/UL (ref 0.1–0.9)
MONOCYTES NFR BLD AUTO: 8 %
NEUTROPHILS # BLD AUTO: 3.7 X10E3/UL (ref 1.4–7)
NEUTROPHILS NFR BLD AUTO: 53 %
PLATELET # BLD AUTO: 310 X10E3/UL (ref 150–450)
POTASSIUM SERPL-SCNC: 4.3 MMOL/L (ref 3.5–5.2)
PROT SERPL-MCNC: 6.7 G/DL (ref 6–8.5)
RBC # BLD AUTO: 4.02 X10E6/UL (ref 3.77–5.28)
SODIUM SERPL-SCNC: 140 MMOL/L (ref 134–144)
TIBC SERPL-MCNC: 300 UG/DL (ref 250–450)
UIBC SERPL-MCNC: 224 UG/DL (ref 131–425)
WBC # BLD AUTO: 7 X10E3/UL (ref 3.4–10.8)

## 2024-12-02 ENCOUNTER — PATIENT MESSAGE (OUTPATIENT)
Dept: ORTHOPEDICS | Facility: CLINIC | Age: 54
End: 2024-12-02
Payer: COMMERCIAL

## 2024-12-13 DIAGNOSIS — I48.3 TYPICAL ATRIAL FLUTTER: Primary | ICD-10-CM

## 2024-12-16 ENCOUNTER — OFFICE VISIT (OUTPATIENT)
Dept: CARDIOLOGY | Facility: CLINIC | Age: 54
End: 2024-12-16
Payer: COMMERCIAL

## 2024-12-16 ENCOUNTER — HOSPITAL ENCOUNTER (OUTPATIENT)
Dept: CARDIOLOGY | Facility: HOSPITAL | Age: 54
Discharge: HOME | End: 2024-12-16
Attending: INTERNAL MEDICINE
Payer: COMMERCIAL

## 2024-12-16 VITALS
WEIGHT: 271.06 LBS | HEART RATE: 100 BPM | SYSTOLIC BLOOD PRESSURE: 126 MMHG | HEIGHT: 66 IN | OXYGEN SATURATION: 98 % | RESPIRATION RATE: 16 BRPM | DIASTOLIC BLOOD PRESSURE: 83 MMHG | BODY MASS INDEX: 43.56 KG/M2

## 2024-12-16 DIAGNOSIS — E66.01 MORBID OBESITY WITH BMI OF 40.0-44.9, ADULT: ICD-10-CM

## 2024-12-16 DIAGNOSIS — I48.3 TYPICAL ATRIAL FLUTTER: ICD-10-CM

## 2024-12-16 DIAGNOSIS — Z01.810 PREOP CARDIOVASCULAR EXAM: ICD-10-CM

## 2024-12-16 DIAGNOSIS — I10 ESSENTIAL HYPERTENSION: Primary | ICD-10-CM

## 2024-12-16 PROCEDURE — 3074F SYST BP LT 130 MM HG: CPT | Mod: CPTII,S$GLB,, | Performed by: INTERNAL MEDICINE

## 2024-12-16 PROCEDURE — 3044F HG A1C LEVEL LT 7.0%: CPT | Mod: CPTII,S$GLB,, | Performed by: INTERNAL MEDICINE

## 2024-12-16 PROCEDURE — 3008F BODY MASS INDEX DOCD: CPT | Mod: CPTII,S$GLB,, | Performed by: INTERNAL MEDICINE

## 2024-12-16 PROCEDURE — 93010 ELECTROCARDIOGRAM REPORT: CPT | Mod: ,,, | Performed by: INTERNAL MEDICINE

## 2024-12-16 PROCEDURE — 1159F MED LIST DOCD IN RCRD: CPT | Mod: CPTII,S$GLB,, | Performed by: INTERNAL MEDICINE

## 2024-12-16 PROCEDURE — 93005 ELECTROCARDIOGRAM TRACING: CPT

## 2024-12-16 PROCEDURE — 99999 PR PBB SHADOW E&M-EST. PATIENT-LVL IV: CPT | Mod: PBBFAC,,, | Performed by: INTERNAL MEDICINE

## 2024-12-16 PROCEDURE — 1160F RVW MEDS BY RX/DR IN RCRD: CPT | Mod: CPTII,S$GLB,, | Performed by: INTERNAL MEDICINE

## 2024-12-16 PROCEDURE — 99205 OFFICE O/P NEW HI 60 MIN: CPT | Mod: S$GLB,,, | Performed by: INTERNAL MEDICINE

## 2024-12-16 PROCEDURE — 3079F DIAST BP 80-89 MM HG: CPT | Mod: CPTII,S$GLB,, | Performed by: INTERNAL MEDICINE

## 2024-12-16 NOTE — PROGRESS NOTES
Subjective:   Patient ID:  Mary Russo is a 54 y.o. female who presents for follow-up of No chief complaint on file.  Pt presents for preop Cv exam R knee replacement. EKG is normal.Patient denies CP, angina or anginal equivalent.  Pt still walks without sx. Pt states echo and stress test at [facility]. ? Hx of MVP    CRF- weight, HTN, family hx of CAD    HPI  Hypertension  This is a chronic problem. The current episode started more than 1 year ago. The problem has been gradually improving since onset. The problem is controlled. Pertinent negatives include no chest pain, palpitations or shortness of breath. Past treatments include beta blockers and angiotensin blockers. The current treatment provides moderate improvement. There are no compliance problems.     Review of Systems   Constitutional: Negative. Negative for weight gain.   HENT: Negative.     Eyes: Negative.    Cardiovascular: Negative.  Negative for chest pain, leg swelling and palpitations.   Respiratory: Negative.  Negative for shortness of breath.    Endocrine: Negative.    Hematologic/Lymphatic: Negative.    Skin: Negative.    Musculoskeletal:  Negative for muscle weakness.   Gastrointestinal: Negative.    Genitourinary: Negative.    Neurological: Negative.  Negative for dizziness.   Psychiatric/Behavioral: Negative.     Allergic/Immunologic: Negative.    All other systems reviewed and are negative.    Family History   Problem Relation Name Age of Onset    Cancer Mother Rosie 49        breast cancer    Heart disease Mother Rosie     Diabetes Mother Rosie     Stroke Mother Rosie     Obesity Mother Rosie     Breast cancer Mother Rosie     Hypertension Mother Rosie     Kidney disease Mother Rosie     Heart disease Father meagan     Obesity Father meagan     Obesity Sister      Obesity Maternal Grandmother      Obesity Sister      Colon cancer Neg Hx      Ovarian cancer Neg Hx       Past Medical History:   Diagnosis Date    Acid reflux      Anxiety     Arthritis     Carpal tunnel syndrome of right wrist     Cholelithiases     Hypertension     Iron deficiency     Morbid obesity     Preop cardiovascular exam 12/16/2024     Social History     Socioeconomic History    Marital status:     Number of children: 3   Occupational History     Employer: [employer]   Tobacco Use    Smoking status: Never    Smokeless tobacco: Never   Substance and Sexual Activity    Alcohol use: Not Currently     Alcohol/week: 1.0 standard drink of alcohol     Types: 1 Glasses of wine per week     Comment: occasionally    Drug use: Never    Sexual activity: Yes     Partners: Male     Birth control/protection: Coitus interruptus, Other-see comments     Comment: tubal     Social Drivers of Health     Financial Resource Strain: Low Risk  (11/13/2023)    Overall Financial Resource Strain (CARDIA)     Difficulty of Paying Living Expenses: Not hard at all   Food Insecurity: No Food Insecurity (11/13/2023)    Hunger Vital Sign     Worried About Running Out of Food in the Last Year: Never true     Ran Out of Food in the Last Year: Never true   Transportation Needs: No Transportation Needs (11/13/2023)    PRAPARE - Transportation     Lack of Transportation (Medical): No     Lack of Transportation (Non-Medical): No   Physical Activity: Insufficiently Active (11/13/2023)    Exercise Vital Sign     Days of Exercise per Week: 2 days     Minutes of Exercise per Session: 50 min   Stress: No Stress Concern Present (11/13/2023)    [employer] [place] [employer] - Occupational Stress Questionnaire     Feeling of Stress : Only a little   Housing Stability: Low Risk  (11/13/2023)    Housing Stability Vital Sign     Unable to Pay for Housing in the Last Year: No     Number of Places Lived in the Last Year: 1     Unstable Housing in the Last Year: No     Current Outpatient Medications on File Prior to Visit   Medication Sig Dispense Refill     acetaminophen (TYLENOL) 500 MG tablet Take 500 mg by mouth every 6 (six) hours as needed.      amLODIPine (NORVASC) 5 MG tablet Take 1 tablet (5 mg total) by mouth once daily. 90 tablet 3    cholecalciferol, vitamin D3, (VITAMIN D3) 25 mcg (1,000 unit) capsule Take 2 capsules (2,000 Units total) by mouth once daily.      cloNIDine (CATAPRES) 0.1 MG tablet TAKE 1 TABLET BY MOUTH ONCE DAILY. 90 tablet 2    diclofenac sodium (VOLTAREN) 1 % Gel Apply 2 g topically 4 (four) times daily. 1 each 2    FLUoxetine 10 MG capsule Take 1 capsule (10 mg total) by mouth once daily. 90 capsule 3    lansoprazole (PREVACID) 30 MG capsule Take 1 capsule (30 mg total) by mouth once daily. 90 capsule 3    LIDOcaine-prilocaine (EMLA) cream Apply topically.      meloxicam (MOBIC) 15 MG tablet Take 1 tablet (15 mg total) by mouth once daily. Take with food 90 tablet 3    metoprolol succinate (TOPROL-XL) 25 MG 24 hr tablet Take 1 tablet (25 mg total) by mouth once daily. 90 tablet 3     No current facility-administered medications on file prior to visit.     Review of patient's allergies indicates:   Allergen Reactions    Adhesive Swelling     Swelling x2 with flu shot and band-aid.  Swells in shape of band-aid.    Lisinopril Other (See Comments)     Cough    Latex, natural rubber Swelling     Localized swelling at site of bandaid/adhesive       Objective:     Physical Exam  Vitals and nursing note reviewed.   Constitutional:       Appearance: She is well-developed.   HENT:      Head: Normocephalic and atraumatic.   Eyes:      Conjunctiva/sclera: Conjunctivae normal.      Pupils: Pupils are equal, round, and reactive to light.   Cardiovascular:      Rate and Rhythm: Normal rate and regular rhythm.      Pulses: Intact distal pulses.      Heart sounds: Normal heart sounds.   Pulmonary:      Effort: Pulmonary effort is normal.      Breath sounds: Normal breath sounds.   Abdominal:      General: Bowel sounds are normal.      Palpations: Abdomen  is soft.   Musculoskeletal:         General: Normal range of motion.      Cervical back: Normal range of motion and neck supple.   Skin:     General: Skin is warm and dry.   Neurological:      Mental Status: She is alert and oriented to person, place, and time.         Assessment:     1. Essential hypertension    2. Morbid obesity with BMI of 40.0-44.9, adult    3. Preop cardiovascular exam        Plan:     Essential hypertension    Morbid obesity with BMI of 40.0-44.9, adult    Preop cardiovascular exam        Continue metoprolol, norvasc- HTN  Pt cleared for procedure/surgery at moderate CV risk

## 2024-12-17 LAB
OHS QRS DURATION: 78 MS
OHS QTC CALCULATION: 425 MS

## 2025-01-15 ENCOUNTER — PATIENT MESSAGE (OUTPATIENT)
Dept: SPORTS MEDICINE | Facility: CLINIC | Age: 55
End: 2025-01-15
Payer: COMMERCIAL

## 2025-01-15 DIAGNOSIS — M17.0 BILATERAL PRIMARY OSTEOARTHRITIS OF KNEE: Primary | ICD-10-CM

## 2025-01-24 ENCOUNTER — PATIENT MESSAGE (OUTPATIENT)
Dept: SPORTS MEDICINE | Facility: CLINIC | Age: 55
End: 2025-01-24
Payer: COMMERCIAL

## 2025-01-29 ENCOUNTER — HOSPITAL ENCOUNTER (OUTPATIENT)
Dept: RADIOLOGY | Facility: HOSPITAL | Age: 55
Discharge: HOME | End: 2025-01-29
Attending: FAMILY MEDICINE
Payer: COMMERCIAL

## 2025-01-29 ENCOUNTER — TELEPHONE (OUTPATIENT)
Dept: SPORTS MEDICINE | Facility: CLINIC | Age: 55
End: 2025-01-29
Payer: COMMERCIAL

## 2025-01-29 DIAGNOSIS — Z12.31 ENCOUNTER FOR SCREENING MAMMOGRAM FOR BREAST CANCER: ICD-10-CM

## 2025-01-29 PROCEDURE — 77063 BREAST TOMOSYNTHESIS BI: CPT | Mod: TC

## 2025-01-29 PROCEDURE — 77063 BREAST TOMOSYNTHESIS BI: CPT | Mod: 26,,, | Performed by: RADIOLOGY

## 2025-01-29 PROCEDURE — 77067 SCR MAMMO BI INCL CAD: CPT | Mod: 26,,, | Performed by: RADIOLOGY

## 2025-01-29 NOTE — TELEPHONE ENCOUNTER
Called patient and scheduled for appointment at 2 pm tomorrow due to traffic today.  Patient verbalized understanding of appt date, time and location.   ----- Message from Sarahi sent at 1/29/2025  2:30 PM CST -----  Contact: [phone]  [email or web address]     Patient is calling for Medical Advice regarding:appt for today     How long has patient had these symptoms:    Pharmacy name and phone#:    Patient wants a call back or thru [facility]:call back     Comments:  She states she spoke to the nurse back there in regards to being late but she states she will be really late the traffic is not moving she is asking for something later today or possibly tomorrow please advise   Please advise patient replies from provider may take up to 48 hours.

## 2025-01-30 ENCOUNTER — OFFICE VISIT (OUTPATIENT)
Dept: SPORTS MEDICINE | Facility: CLINIC | Age: 55
End: 2025-01-30
Payer: COMMERCIAL

## 2025-01-30 VITALS — HEIGHT: 66 IN | WEIGHT: 274.94 LBS | BODY MASS INDEX: 44.19 KG/M2

## 2025-01-30 DIAGNOSIS — M17.0 BILATERAL PRIMARY OSTEOARTHRITIS OF KNEE: Primary | ICD-10-CM

## 2025-01-30 PROCEDURE — 20611 DRAIN/INJ JOINT/BURSA W/US: CPT | Mod: 50,S$GLB,, | Performed by: STUDENT IN AN ORGANIZED HEALTH CARE EDUCATION/TRAINING PROGRAM

## 2025-01-30 PROCEDURE — 99999 PR PBB SHADOW E&M-EST. PATIENT-LVL III: CPT | Mod: PBBFAC,,, | Performed by: STUDENT IN AN ORGANIZED HEALTH CARE EDUCATION/TRAINING PROGRAM

## 2025-01-30 PROCEDURE — 99499 UNLISTED E&M SERVICE: CPT | Mod: S$GLB,,, | Performed by: STUDENT IN AN ORGANIZED HEALTH CARE EDUCATION/TRAINING PROGRAM

## 2025-01-30 NOTE — PROGRESS NOTES
Patient ID: Mary Russo  YOB: 1970  MRN: [id number]    Chief Complaint: Pain of the Left Knee, Pain of the Right Knee, and Follow-up (Bilateral zilretta )    History of Present Illness: Mary Russo is a right-hand dominant 54 y.o. female who presents today for followup bilateral knee pain. Having significant pain in both knees today, She has tried and failed  numerous treatments.  Her knees  still  have an dull ache and popping. Has to stand and wait awhile before taking a step.  She Is here today for Zilretta injections. No new falls or injuries    10/03/2024 Interval History of Present Illness: Mary Russo is a right-hand dominant 54 y.o. female who presents today for followup bilateral knee pain. Having significant pain in both knees today, IOVERA was denied and Zilretta was denied by her insurance. She has tried and failed  numerous treatments.  Her knees  still  have an dull ache and popping. Has to stand and wait awhile before taking a step.  Her last injection was  methylPREDNISolone in June,  lasting about 2 weeks of pain relief from each of them.  No new falls or injuries. She is requesting bilateral Cortizone injections today.  Her pain is 9/10 on pain scale.      09/26/2024 Interval History of Present Illness: Mary Russo is a right-hand dominant 54 y.o. female who presents today for followup bilateral knee pain. Having significant pain in both knees today, IOVERA was denied and Zilretta was denied by her insurance. She was told by her insurance company that she needed to try and fail at least 2 steroid injections.  Her knees  still  have an dull ache and popping. Has to stand and wait awhile before taking a step.  She has tried  methylPREDNISolone in June and kenalog in March  with about 2 weeks of pain relief from each of them.  No new falls or injuries. She is here to today to discuss her next step in pain management , Her pain is 9/10 on pain  scale.     06/27/2024 Interval History of Present Illness: Mary Russo is a right-hand dominant 54 y.o. female who presents today for followup bilateral knee pain. Having significant pain in both knees today, YASIR was denied and Zilretta was denied by her insurance. She was told by her insurance company that she needed to try and fail at least 2 steroid injections.  No new falls or injuries. She is here to today to try a different CSI in both knees. Her pain is 9/10 on pain scale. She is scheduled to go to [place] for work at the beginning of July, she is hoping that steroid today gives much relief.      The patient is active in none.        Past Medical History:   Past Medical History:   Diagnosis Date    Acid reflux     Anxiety     Arthritis     Carpal tunnel syndrome of right wrist     Cholelithiases     Hypertension     Iron deficiency     Morbid obesity     Preop cardiovascular exam 12/16/2024     Past Surgical History:   Procedure Laterality Date    CHOLECYSTECTOMY      ESOPHAGOGASTRODUODENOSCOPY N/A 10/14/2019    Procedure: ESOPHAGOGASTRODUODENOSCOPY (EGD);  Surgeon: Stefanie Monique MD;  Location: [facility];  Service: Endoscopy;  Laterality: N/A;    ESOPHAGOGASTRODUODENOSCOPY N/A 12/23/2019    Procedure: EGD (ESOPHAGOGASTRODUODENOSCOPY);  Surgeon: Stefanie Monique MD;  Location: [facility];  Service: Endoscopy;  Laterality: N/A;    EYE SURGERY  02/2018    Lasik    HERNIA REPAIR      LAPAROSCOPIC GASTRIC BANDING  10/31/2017    removed    REFRACTIVE SURGERY      TUBAL LIGATION       Family History   Problem Relation Name Age of Onset    Cancer Mother Rosie 49        breast cancer    Heart disease Mother Rosie     Diabetes Mother Rosie     Stroke Mother Rosie     Obesity Mother Rosie     Breast cancer Mother Rosie     Hypertension Mother Rosie     Kidney disease Mother Rosie     Heart disease Father meagan     Obesity Father meagan     Obesity Sister      Obesity Maternal Grandmother       Obesity Sister      Colon cancer Neg Hx      Ovarian cancer Neg Hx       Social History     Socioeconomic History    Marital status:     Number of children: 3   Occupational History     Employer: [employer]   Tobacco Use    Smoking status: Never    Smokeless tobacco: Never   Substance and Sexual Activity    Alcohol use: Not Currently     Alcohol/week: 1.0 standard drink of alcohol     Types: 1 Glasses of wine per week     Comment: occasionally    Drug use: Never    Sexual activity: Yes     Partners: Male     Birth control/protection: Coitus interruptus, Other-see comments     Comment: tubal     Social Drivers of Health     Financial Resource Strain: Low Risk  (1/16/2025)    Overall Financial Resource Strain (CARDIA)     Difficulty of Paying Living Expenses: Not hard at all   Food Insecurity: No Food Insecurity (1/16/2025)    Hunger Vital Sign     Worried About Running Out of Food in the Last Year: Never true     Ran Out of Food in the Last Year: Never true   Transportation Needs: No Transportation Needs (11/13/2023)    PRAPARE - Transportation     Lack of Transportation (Medical): No     Lack of Transportation (Non-Medical): No   Physical Activity: Insufficiently Active (1/16/2025)    Exercise Vital Sign     Days of Exercise per Week: 1 day     Minutes of Exercise per Session: 20 min   Stress: No Stress Concern Present (1/16/2025)    [place] [employer] - Occupational Stress Questionnaire     Feeling of Stress : Only a little   Housing Stability: Low Risk  (11/13/2023)    Housing Stability Vital Sign     Unable to Pay for Housing in the Last Year: No     Number of Places Lived in the Last Year: 1     Unstable Housing in the Last Year: No     Medication List with Changes/Refills   Current Medications    ACETAMINOPHEN (TYLENOL) 500 MG TABLET    Take 500 mg by mouth every 6 (six) hours as needed.    AMLODIPINE (NORVASC) 5 MG TABLET    Take 1 tablet (5 mg  total) by mouth once daily.    AMOXICILLIN-CLAVULANATE 875-125MG (AUGMENTIN) 875-125 MG PER TABLET    Take 1 tablet by mouth 2 (two) times daily. for 7 days    DICLOFENAC SODIUM (VOLTAREN) 1 % GEL    Apply 2 g topically 4 (four) times daily.    IPRATROPIUM (ATROVENT) 21 MCG (0.03 %) NASAL SPRAY    2 sprays by Each Nostril route 3 (three) times daily as needed for Rhinitis.    LANSOPRAZOLE (PREVACID) 30 MG CAPSULE    Take 1 capsule (30 mg total) by mouth once daily.    LIDOCAINE-PRILOCAINE (EMLA) CREAM    Apply topically.    MELOXICAM (MOBIC) 15 MG TABLET    Take 1 tablet (15 mg total) by mouth once daily. Take with food    METOPROLOL SUCCINATE (TOPROL-XL) 25 MG 24 HR TABLET    Take 1 tablet (25 mg total) by mouth once daily.     Review of patient's allergies indicates:   Allergen Reactions    Adhesive Swelling     Swelling x2 with flu shot and band-aid.  Swells in shape of band-aid.    Lisinopril Other (See Comments)     Cough    Latex, natural rubber Swelling     Localized swelling at site of bandaid/adhesive       Physical Exam:   Body mass index is 44.37 kg/m².    GENERAL: Well appearing, in no acute distress.  HEAD: Normocephalic and atraumatic.  ENT: External ears and nose grossly normal.  EYES: EOMI bilaterally  PULMONARY: Respirations are grossly even and non-labored.  NEURO: Awake, alert, and oriented x 3.  SKIN: No obvious rashes appreciated.  PSYCH: Mood & affect are appropriate.    Detailed MSK exam:     Right knee exam:   -ROM: extension 0, flexion 110  -TTP: Medial joint line and Lateral joint line  -effusion: trace  -Patellar apprehension negative  -Hunter test negative  -stable to varus and valgus stress tests  -Lachman test negative, anterior drawer test negative, posterior drawer test negative     Left knee exam:   -ROM: extension 0, flexion 110  -TTP: Medial joint line and Lateral joint line  -effusion: trace  -Patellar apprehension negative  -Hunter test negative  -stable to varus and valgus  stress tests  -Lachman test negative, anterior drawer test negative, posterior drawer test negative    Imaging:  Mammo Digital Screening Bilat w/ Lemuel  Narrative: Facility:  [facility] [address] [place] [id number] [phone]    Name: Mary Russo    MRN: [id number]    Result:  Mammo Digital Screening Bilat w/ Lemuel    History:  Patient is 54 y.o. and is seen for a screening mammogram.    Films Compared:  Compared to: 01/19/2024 Mammo Digital Screening Bilat w/ Lemuel, 01/03/2023   Mammo Digital Screening Bilat w/ Lemuel, 12/28/2021 Mammo Digital Screening   Bilat w/ Lemuel, and 12/14/2020 Mammo Digital Screening Bilat w/ Lemuel     Findings:  This procedure was performed using tomosynthesis.   Computer-aided detection was utilized in the interpretation of this   examination.    There are scattered areas of fibroglandular density. There is no evidence   of suspicious masses, microcalcifications or architectural distortion.  Impression:    No mammographic evidence of malignancy.    BI-RADS Category 1: Negative    Recommendation:  Routine screening mammogram in 1 year is recommended.    Your estimated lifetime risk of breast cancer (to age 85) based on   Tyrer-Cuzick risk assessment model is 19.75%.  According to the American   Cancer Society, patients with a lifetime breast cancer risk of 20% or   higher might benefit from supplemental screening tests, such as screening   breast MRI.    Adriel Salazar,         Relevant imaging results were reviewed and interpreted by me and per my read shows severe arthritic changes bilaterally.  This was discussed with the patient and / or family today.     Assessment:  Mary Russo is a 54 y.o. female following up for bilateral knee pain.   Plan: patient has tried triamcinolone and methylprednisolone steroid injections, both of which only lasted 2-4 weeks each.   Steroid injection given today (see separate procedure note for  details). We discussed the proper protocols after the injection such as no submerging pools, baths tubs, or hot tubs for 24 hr.  Showering is okay today.  We also discussed that blood sugars can be elevated after an injection and asked patient to properly checked her sugars over the next few days and contact their PCP if there are any concerns.  We discussed red flags such as fevers, chills, red, warm, tender joint at the area of injection to please seek medical care immediately.   Zilretta injections denied again. Patient has visit with Dr Bedoya today as well. She plans to maybe do surgery for right knee after the new year.    Follow up with Dr Bedoya. All questions answered.     Bilateral primary osteoarthritis of knee  -     Sports Medicine US - Guidance for Needle Placement         Ultrasound guidance was used for needle localization. Images were saved and stored for documentation. The appropriate structures were visualized. Dynamic visualization of the needle was continuous throughout the procedures and maintained good position.      Electronically signed:  Skip Rich MD, MPH  01/30/2025  4:36 PM        History of Present Illness: Mary Russo is a right-hand dominant 53 y.o. female who presents for follow up s/p  bilateral knee pain. Right knee CSI 9/27/2023, worked well She reports right knee popping for the last week. Water aerobics does help, she has not been to the gym in a couple of days but she does intend to start going to water aerobic soon.  Sitting to standing her pain is 7/10,  She is here today for CSI in both knee.     The patient is active in none.  Occupation: Human resources      Past Medical History:   Past Medical History:   Diagnosis Date    Acid reflux     Anxiety     Arthritis     Carpal tunnel syndrome of right wrist     Cholelithiases     Hypertension     Iron deficiency     Morbid obesity     Preop cardiovascular exam 12/16/2024     Past Surgical History:    Procedure Laterality Date    CHOLECYSTECTOMY      ESOPHAGOGASTRODUODENOSCOPY N/A 10/14/2019    Procedure: ESOPHAGOGASTRODUODENOSCOPY (EGD);  Surgeon: Stefanie Monique MD;  Location: [facility];  Service: Endoscopy;  Laterality: N/A;    ESOPHAGOGASTRODUODENOSCOPY N/A 12/23/2019    Procedure: EGD (ESOPHAGOGASTRODUODENOSCOPY);  Surgeon: Stefanie Monique MD;  Location: [facility];  Service: Endoscopy;  Laterality: N/A;    EYE SURGERY  02/2018    Lasik    HERNIA REPAIR      LAPAROSCOPIC GASTRIC BANDING  10/31/2017    removed    REFRACTIVE SURGERY      TUBAL LIGATION       Family History   Problem Relation Name Age of Onset    Cancer Mother Rosie 49        breast cancer    Heart disease Mother Rosie     Diabetes Mother Rosie     Stroke Mother Rosie     Obesity Mother [place]     Breast cancer Mother Rosie     Hypertension Mother Rosie     Kidney disease Mother Rosie     Heart disease Father meagan     Obesity Father meagan     Obesity Sister      Obesity Maternal Grandmother      Obesity Sister      Colon cancer Neg Hx      Ovarian cancer Neg Hx       Social History     Socioeconomic History    Marital status:     Number of children: 3   Occupational History     Employer: [employer]   Tobacco Use    Smoking status: Never    Smokeless tobacco: Never   Substance and Sexual Activity    Alcohol use: Not Currently     Alcohol/week: 1.0 standard drink of alcohol     Types: 1 Glasses of wine per week     Comment: occasionally    Drug use: Never    Sexual activity: Yes     Partners: Male     Birth control/protection: Coitus interruptus, Other-see comments     Comment: tubal     Social Drivers of Health     Financial Resource Strain: Low Risk  (1/16/2025)    Overall Financial Resource Strain (CARDIA)     Difficulty of Paying Living Expenses: Not hard at all   Food Insecurity: No Food Insecurity (1/16/2025)    Hunger Vital Sign     Worried About Running Out of Food in the Last  Year: Never true     Ran Out of Food in the Last Year: Never true   Transportation Needs: No Transportation Needs (11/13/2023)    PRAPARE - Transportation     Lack of Transportation (Medical): No     Lack of Transportation (Non-Medical): No   Physical Activity: Insufficiently Active (1/16/2025)    Exercise Vital Sign     Days of Exercise per Week: 1 day     Minutes of Exercise per Session: 20 min   Stress: No Stress Concern Present (1/16/2025)    [employer] [place] [employer] - Occupational Stress Questionnaire     Feeling of Stress : Only a little   Housing Stability: Low Risk  (11/13/2023)    Housing Stability Vital Sign     Unable to Pay for Housing in the Last Year: No     Number of Places Lived in the Last Year: 1     Unstable Housing in the Last Year: No     Medication List with Changes/Refills   Current Medications    ACETAMINOPHEN (TYLENOL) 500 MG TABLET    Take 500 mg by mouth every 6 (six) hours as needed.    AMLODIPINE (NORVASC) 5 MG TABLET    Take 1 tablet (5 mg total) by mouth once daily.    AMOXICILLIN-CLAVULANATE 875-125MG (AUGMENTIN) 875-125 MG PER TABLET    Take 1 tablet by mouth 2 (two) times daily. for 7 days    DICLOFENAC SODIUM (VOLTAREN) 1 % GEL    Apply 2 g topically 4 (four) times daily.    IPRATROPIUM (ATROVENT) 21 MCG (0.03 %) NASAL SPRAY    2 sprays by Each Nostril route 3 (three) times daily as needed for Rhinitis.    LANSOPRAZOLE (PREVACID) 30 MG CAPSULE    Take 1 capsule (30 mg total) by mouth once daily.    LIDOCAINE-PRILOCAINE (EMLA) CREAM    Apply topically.    MELOXICAM (MOBIC) 15 MG TABLET    Take 1 tablet (15 mg total) by mouth once daily. Take with food    METOPROLOL SUCCINATE (TOPROL-XL) 25 MG 24 HR TABLET    Take 1 tablet (25 mg total) by mouth once daily.     Review of patient's allergies indicates:   Allergen Reactions    Adhesive Swelling     Swelling x2 with flu shot and band-aid.  Swells in shape of band-aid.    Lisinopril Other (See Comments)     Cough     Latex, natural rubber Swelling     Localized swelling at site of bandaid/adhesive       Physical Exam:   Body mass index is 44.37 kg/m².    GENERAL: Well appearing, in no acute distress.  HEAD: Normocephalic and atraumatic.  ENT: External ears and nose grossly normal.  EYES: EOMI bilaterally  PULMONARY: Respirations are grossly even and non-labored.  NEURO: Awake, alert, and oriented x 3.  SKIN: No obvious rashes appreciated.  PSYCH: Mood & affect are appropriate.    Detailed MSK exam:     Right knee exam:   -ROM: extension 0, flexion 110  -TTP: Medial joint line and Lateral joint line  -effusion: trace  -Patellar apprehension negative  -Hunter test negative  -stable to varus and valgus stress tests  -Lachman test negative, anterior drawer test negative, posterior drawer test negative    Left knee exam:   -ROM: extension 0, flexion 110  -TTP: Medial joint line and Lateral joint line  -effusion: trace  -Patellar apprehension negative  -Hunter test negative  -stable to varus and valgus stress tests  -Lachman test negative, anterior drawer test negative, posterior drawer test negative      Imaging:  Mammo Digital Screening Bilat w/ Lemuel  Narrative: Facility:  [facility] [place] [address] [place] [address] [phone]    Name: Mary Russo    MRN: [id number]    Result:  Mammo Digital Screening Bilat w/ Lemuel    History:  Patient is 54 y.o. and is seen for a screening mammogram.    Films Compared:  Compared to: 01/19/2024 Mammo Digital Screening Bilat w/ Lemuel, 01/03/2023   Mammo Digital Screening Bilat w/ Lemuel, 12/28/2021 Mammo Digital Screening   Bilat w/ Lemuel, and 12/14/2020 Mammo Digital Screening Bilat w/ Lemuel     Findings:  This procedure was performed using tomosynthesis.   Computer-aided detection was utilized in the interpretation of this   examination.    There are scattered areas of fibroglandular density. There is no evidence   of suspicious masses,  microcalcifications or architectural distortion.  Impression:    No mammographic evidence of malignancy.    BI-RADS Category 1: Negative    Recommendation:  Routine screening mammogram in 1 year is recommended.    Your estimated lifetime risk of breast cancer (to age 85) based on   Tyrer-Cuzick risk assessment model is 19.75%.  According to the American   Cancer Society, patients with a lifetime breast cancer risk of 20% or   higher might benefit from supplemental screening tests, such as screening   breast MRI.    Adriel Salazar, DO        Relevant imaging results were reviewed and interpreted by me and per my read shows severe arthritic changes bilaterally.  This was discussed with the patient and / or family today.     Assessment:  Mary Russo is a 54 y.o. female following up for chronic bilateral knee pain. Steroid injection lasts around 3 months and is interested in repeating today.   Plan: Steroid injection given today (see separate procedure note for details). We discussed the proper protocols after the injection such as no submerging pools, baths tubs, or hot tubs for 24 hr.  Showering is okay today.  We also discussed that blood sugars can be elevated after an injection and asked patient to properly checked her sugars over the next few days and contact their PCP if there are any concerns.  We discussed red flags such as fevers, chills, red, warm, tender joint at the area of injection to please seek medical care immediately.   Consider iovera if not improving. Gel injection was ineffective in the past. PT referral at the [place] for aquatic therapy.   Follow up as needed. All questions answered.     Bilateral primary osteoarthritis of knee  -     Sports Medicine US - Guidance for Needle Placement           Ultrasound guidance was used for needle localization. Images were saved and stored for documentation. The appropriate structures were visualized. Dynamic visualization of the needle was  continuous throughout the procedures and maintained good position.      Electronically signed:  Skip Rich MD, MPH  01/30/2025  3:32 PM

## 2025-01-30 NOTE — PROCEDURES
Sports Medicine US - Guidance for Needle Placement    Date/Time: 1/30/2025 2:00 PM    Performed by: Skip Rich MD  Authorized by: Skip Rich MD  Preparation: Patient was prepped and draped in the usual sterile fashion.  Local anesthesia used: no    Anesthesia:  Local anesthesia used: no    Sedation:  Patient sedated: no    Patient tolerance: patient tolerated the procedure well with no immediate complications  Comments: Ultrasound guidance was used for needle localization. Images were saved and stored for documentation. The appropriate structures were visualized. Dynamic visualization of the needle was continuous throughout the procedures and maintained good position.

## 2025-01-30 NOTE — PATIENT INSTRUCTIONS
Assessment:  Mary Russo is a 54 y.o. female No chief complaint on file.      No diagnosis found.     Plan:  Ultrasound guided Zilretta injections to bilateral knees  We discussed the proper protocols after the injection such as no submerging pools, baths tubs, or hot tubs for 24 hr.  Showering is okay today.  We also discussed that blood sugars can be elevated after an injection and asked patient to properly checked her sugars over the next few days and contact their PCP if there are any concerns.  We discussed red flags such as fevers, chills, red, warm, tender joint at the area of injection to please seek medical care immediately.    Follow up as needed    Follow-up: as needed.    Thank you for choosing [facility] and Dr. Skip Rich for your orthopedic & sports medicine care. It is our goal to provide you with exceptional care that will help keep you healthy, active, and get you back in the game.    Please do not hesitate to reach out to us via email, phone, or MyChart with any questions, concerns, or feedback.    If you felt that you received exemplary care today, please consider leaving us feedback on groopifys at:  [email or web address]    If you are experiencing pain/discomfort ,or have questions after 5pm and would like to be connected to the [facility] on-call team, please call this number and specify which Sports Medicine provider is treating you: [phone]

## 2025-01-30 NOTE — PROCEDURES
Large Joint Aspiration/Injection: bilateral knee    Date/Time: 1/30/2025 2:00 PM    Performed by: Skip Rich MD  Authorized by: Skip Rich MD    Consent Done?:  Yes (Verbal)  Indications:  Arthritis and pain  Site marked: the procedure site was marked    Timeout: prior to procedure the correct patient, procedure, and site was verified    Prep: patient was prepped and draped in usual sterile fashion      Details:  Needle Size:  21 G  Ultrasonic Guidance for needle placement?: Yes    Images are saved and documented.  Approach:  Lateral (superior)  Location:  Knee  Laterality:  Bilateral  Site:  Bilateral knee  Medications (Right):  32 mg triamcinolone acetonide 32 mg  Medications (Left):  32 mg triamcinolone acetonide 32 mg  Patient tolerance:  Patient tolerated the procedure well with no immediate complications     Ultrasound guidance was used for needle localization. Images were saved and stored for documentation. The appropriate structures were visualized. Dynamic visualization of the needle was continuous throughout the procedures and maintained good position.

## 2025-01-31 ENCOUNTER — OFFICE VISIT (OUTPATIENT)
Dept: OBSTETRICS AND GYNECOLOGY | Facility: CLINIC | Age: 55
End: 2025-01-31
Payer: COMMERCIAL

## 2025-01-31 VITALS
DIASTOLIC BLOOD PRESSURE: 78 MMHG | SYSTOLIC BLOOD PRESSURE: 122 MMHG | BODY MASS INDEX: 43.93 KG/M2 | WEIGHT: 273.38 LBS | HEIGHT: 66 IN

## 2025-01-31 DIAGNOSIS — Z78.0 POSTMENOPAUSAL: ICD-10-CM

## 2025-01-31 DIAGNOSIS — Z01.419 ENCOUNTER FOR GYNECOLOGICAL EXAMINATION WITHOUT ABNORMAL FINDING: Primary | ICD-10-CM

## 2025-01-31 DIAGNOSIS — R23.2 HOT FLASHES: ICD-10-CM

## 2025-01-31 PROCEDURE — 1160F RVW MEDS BY RX/DR IN RCRD: CPT | Mod: CPTII,S$GLB,, | Performed by: NURSE PRACTITIONER

## 2025-01-31 PROCEDURE — 3078F DIAST BP <80 MM HG: CPT | Mod: CPTII,S$GLB,, | Performed by: NURSE PRACTITIONER

## 2025-01-31 PROCEDURE — 1159F MED LIST DOCD IN RCRD: CPT | Mod: CPTII,S$GLB,, | Performed by: NURSE PRACTITIONER

## 2025-01-31 PROCEDURE — 99396 PREV VISIT EST AGE 40-64: CPT | Mod: S$GLB,,, | Performed by: NURSE PRACTITIONER

## 2025-01-31 PROCEDURE — 3008F BODY MASS INDEX DOCD: CPT | Mod: CPTII,S$GLB,, | Performed by: NURSE PRACTITIONER

## 2025-01-31 PROCEDURE — 99999 PR PBB SHADOW E&M-EST. PATIENT-LVL IV: CPT | Mod: PBBFAC,,, | Performed by: NURSE PRACTITIONER

## 2025-01-31 PROCEDURE — 3074F SYST BP LT 130 MM HG: CPT | Mod: CPTII,S$GLB,, | Performed by: NURSE PRACTITIONER

## 2025-01-31 NOTE — PROGRESS NOTES
CC: Well woman exam    Mary Russo is a 54 y.o. female  presents for well woman exam.  LMP: No LMP recorded. Patient is premenopausal..    FSH done 24 - 65.4  Pt is having hot flashes   Mother had breast cancer and is now  - pt is not sure if was hormone receptive although tamoxifen sounds familiar   Pt was placed on clonidine in past but did not take for long  Has been using a menopause patch with dong Quai and black cohosh from patchaide that may help some     Health Maintenance Topics with due status: Not Due       Topic Last Completion Date    TETANUS VACCINE 2016    Cervical Cancer Screening 2023    Hemoglobin A1c (Diabetic Prevention Screening) 2024    Lipid Panel 2024    Colorectal Cancer Screening 2024    Mammogram 2025    RSV Vaccine (Age 60+ and Pregnant patients) Not Due     Past Medical History:   Diagnosis Date    Acid reflux     Anxiety     Arthritis     Carpal tunnel syndrome of right wrist     Cholelithiases     Hypertension     Iron deficiency     Morbid obesity     Preop cardiovascular exam 2024     Past Surgical History:   Procedure Laterality Date    CHOLECYSTECTOMY      ESOPHAGOGASTRODUODENOSCOPY N/A 10/14/2019    Procedure: ESOPHAGOGASTRODUODENOSCOPY (EGD);  Surgeon: Stefanie Monique MD;  Location: [facility];  Service: Endoscopy;  Laterality: N/A;    ESOPHAGOGASTRODUODENOSCOPY N/A 2019    Procedure: EGD (ESOPHAGOGASTRODUODENOSCOPY);  Surgeon: Stefanie Monique MD;  Location: [facility];  Service: Endoscopy;  Laterality: N/A;    EYE SURGERY  2018    Lasik    HERNIA REPAIR      LAPAROSCOPIC GASTRIC BANDING  10/31/2017    removed    REFRACTIVE SURGERY      TUBAL LIGATION       Social History     Socioeconomic History    Marital status:     Number of children: 3   Occupational History     Employer: [employer]   Tobacco Use    Smoking status: Never    Smokeless tobacco:  Never   Substance and Sexual Activity    Alcohol use: Not Currently     Alcohol/week: 1.0 standard drink of alcohol     Types: 1 Glasses of wine per week     Comment: occasionally    Drug use: Never    Sexual activity: Yes     Partners: Male     Birth control/protection: Coitus interruptus, Other-see comments     Comment: tubal     Social Drivers of Health     Financial Resource Strain: Low Risk  (2025)    Overall Financial Resource Strain (CARDIA)     Difficulty of Paying Living Expenses: Not hard at all   Food Insecurity: No Food Insecurity (2025)    Hunger Vital Sign     Worried About Running Out of Food in the Last Year: Never true     Ran Out of Food in the Last Year: Never true   Transportation Needs: No Transportation Needs (2023)    PRAPARE - Transportation     Lack of Transportation (Medical): No     Lack of Transportation (Non-Medical): No   Physical Activity: Insufficiently Active (2025)    Exercise Vital Sign     Days of Exercise per Week: 1 day     Minutes of Exercise per Session: 20 min   Stress: No Stress Concern Present (2025)    [place] [employer] - Occupational Stress Questionnaire     Feeling of Stress : Only a little   Housing Stability: Low Risk  (2023)    Housing Stability Vital Sign     Unable to Pay for Housing in the Last Year: No     Number of Places Lived in the Last Year: 1     Unstable Housing in the Last Year: No     Family History   Problem Relation Name Age of Onset    Cancer Mother Rosie 49        breast cancer    Heart disease Mother Rosie     Diabetes Mother Rosie     Stroke Mother Rosie     Obesity Mother [place]     Breast cancer Mother Rosie     Hypertension Mother Rosie     Kidney disease Mother Rosie     Heart disease Father meagan     Obesity Father meagan     Obesity Sister      Obesity Maternal Grandmother      Obesity Sister      Colon cancer Neg Hx      Ovarian cancer Neg Hx       OB History          5     "Para   3    Term   3            AB   2    Living   3         SAB        IAB        Ectopic        Multiple        Live Births                     /78 (BP Location: Right arm, Patient Position: Sitting)   Ht 5' 6" (1.676 m)   Wt 124 kg (273 lb 5.9 oz)   BMI 44.12 kg/m²       ROS:  Per hpi    PHYSICAL EXAM:  APPEARANCE: Well nourished, well developed, in no acute distress.  AFFECT: WNL, alert and oriented x 3  SKIN: No acne or hirsutism  NECK: Neck symmetric without masses or thyromegaly  NODES: No inguinal, cervical, axillary, or femoral lymph node enlargement  CHEST: Good respiratory effect  ABDOMEN: Soft.  No tenderness or masses.  No hepatosplenomegaly.  No hernias.  BREASTS: Symmetrical, no skin changes or visible lesions.  No palpable masses, nipple discharge bilaterally.  PELVIC: Normal external genitalia without lesions.  Normal hair distribution.  Adequate perineal body, normal urethral meatus.  Vagina moist and well rugated without lesions or discharge.  Cervix pink, without lesions, discharge or tenderness.  No significant cystocele or rectocele.  Bimanual exam shows uterus to be normal size, regular, mobile and nontender.  Adnexa without masses or tenderness.    EXTREMITIES: No edema.  Physical Exam    1. Encounter for gynecological examination without abnormal finding        2. Postmenopausal        3. Hot flashes         AND PLAN:    Mary was seen today for well woman.    Diagnoses and all orders for this visit:    Encounter for gynecological examination without abnormal finding    Postmenopausal    Hot flashes     Discussed risk/benefits with estrogen and progesterone HRT use - with unsure breast cancer history benefits may not out weight risks  Offered referral to genetics  Discussed veozah and need for close follow up of LFT's   At this time pt will stay on her patches and then let me know if desires to do something else        Patient was counseled today on A.C.S. Pap guidelines " and recommendations for yearly pelvic exams, mammograms and monthly self breast exams; to see her PCP for other health maintenance.                     Answers submitted by the patient for this visit:  Gynecologic Exam Questionnaire  (Submitted on 2025)  Chief Complaint: Gynecologic exam  genital itching: No  genital lesions: No  genital odor: No  genital rash: No  missed menses: No  pelvic pain: No  vaginal bleeding: No  vaginal discharge: No  Pregnant now?: No  abdominal pain: No  anorexia: No  back pain: No  chills: No  constipation: No  diarrhea: No  discolored urine: No  dysuria: No  fever: No  flank pain: No  frequency: No  headaches: No  hematuria: No  nausea: No  painful intercourse: Yes  rash: No  urgency: No  vomiting: No  Vaginal bleeding: no bleeding  Passing clots?: No  Passing tissue?: No  Sexual activity: sexually active  Partner with STD symptoms: no  Birth control: tubal ligation  Menstrual history: postmenopausal  STD: No  abdominal surgery: Yes   section: No  Ectopic pregnancy: No  Endometriosis: No  herpes simplex: No  gynecological surgery: No  menorrhagia: No  metrorrhagia: No  miscarriage: No  ovarian cysts: No  perineal abscess: No  PID: No  terminated pregnancy: No  vaginosis: No

## 2025-04-05 ENCOUNTER — PATIENT MESSAGE (OUTPATIENT)
Dept: SPORTS MEDICINE | Facility: CLINIC | Age: 55
End: 2025-04-05
Payer: COMMERCIAL

## 2025-04-07 ENCOUNTER — PATIENT MESSAGE (OUTPATIENT)
Dept: ORTHOPEDICS | Facility: CLINIC | Age: 55
End: 2025-04-07
Payer: COMMERCIAL

## 2025-04-21 ENCOUNTER — PATIENT MESSAGE (OUTPATIENT)
Dept: INTERNAL MEDICINE | Facility: CLINIC | Age: 55
End: 2025-04-21
Payer: COMMERCIAL

## 2025-04-21 NOTE — LETTER
[facility]  [address] [place] [address] DR SHAISTA GARCIAS [address]  Phone: [phone]  Fax: [phone] April 21, 2025     Patient: Mary Russo   YOB: 1970   Date of Visit: 4/21/2025       To Whom It May Concern:    Patient is under care of Dr. Rich (Orthopedic Specialist) for osteoarthritis of bilateral knees.    Sincerely,        Deloris Aiken MD

## 2025-05-05 ENCOUNTER — PATIENT MESSAGE (OUTPATIENT)
Dept: SPORTS MEDICINE | Facility: CLINIC | Age: 55
End: 2025-05-05
Payer: COMMERCIAL

## 2025-05-07 ENCOUNTER — OFFICE VISIT (OUTPATIENT)
Dept: SPORTS MEDICINE | Facility: CLINIC | Age: 55
End: 2025-05-07
Payer: COMMERCIAL

## 2025-05-07 VITALS — BODY MASS INDEX: 43.93 KG/M2 | HEIGHT: 66 IN | WEIGHT: 273.38 LBS

## 2025-05-07 DIAGNOSIS — M17.0 BILATERAL PRIMARY OSTEOARTHRITIS OF KNEE: Primary | ICD-10-CM

## 2025-05-07 PROCEDURE — 99999 PR PBB SHADOW E&M-EST. PATIENT-LVL III: CPT | Mod: PBBFAC,,, | Performed by: STUDENT IN AN ORGANIZED HEALTH CARE EDUCATION/TRAINING PROGRAM

## 2025-05-07 RX ORDER — TRIAMCINOLONE ACETONIDE 40 MG/ML
40 INJECTION, SUSPENSION INTRA-ARTICULAR; INTRAMUSCULAR
Status: DISCONTINUED | OUTPATIENT
Start: 2025-05-07 | End: 2025-05-07 | Stop reason: HOSPADM

## 2025-05-07 RX ADMIN — TRIAMCINOLONE ACETONIDE 40 MG: 40 INJECTION, SUSPENSION INTRA-ARTICULAR; INTRAMUSCULAR at 11:05

## 2025-05-07 NOTE — PROGRESS NOTES
Patient ID: Mary Russo  YOB: 1970  MRN: [id number]    Chief Complaint: Pain of the Left Knee, Pain of the Right Knee, and Follow-up    History of Present Illness: Mary Russo is a right-hand dominant 54 y.o. female who presents today for followup bilateral knee pain. Having significant pain in both knees today, She has tried and failed  numerous treatments.  Her knees  still  have an dull ache and popping. Has to stand and wait awhile before taking a step.  Her last injection was 01/30,  lasting about 6 weeks of pain relief from each of them.  No new falls or injuries. She is requesting bilateral Cortizone injections today.  Her pain is 9/10 on pain scale.    01/30/2025 Interval History of Present Illness: Mary Russo is a right-hand dominant 54 y.o. female who presents today for followup bilateral knee pain. Having significant pain in both knees today, She has tried and failed  numerous treatments.  Her knees  still  have an dull ache and popping. Has to stand and wait awhile before taking a step.  She Is here today for Zilretta injections. No new falls or injuries    10/03/2024 Interval History of Present Illness: Mary Russo is a right-hand dominant 54 y.o. female who presents today for followup bilateral knee pain. Having significant pain in both knees today, IOVERA was denied and Zilretta was denied by her insurance. She has tried and failed  numerous treatments.  Her knees  still  have an dull ache and popping. Has to stand and wait awhile before taking a step.  Her last injection was  methylPREDNISolone in June,  lasting about 2 weeks of pain relief from each of them.  No new falls or injuries. She is requesting bilateral Cortizone injections today.  Her pain is 9/10 on pain scale.      09/26/2024 Interval History of Present Illness: Mary Russo is a right-hand dominant 54 y.o. female who presents today for followup bilateral knee pain.  Having significant pain in both knees today, IOVERA was denied and Zilretta was denied by her insurance. She was told by her insurance company that she needed to try and fail at least 2 steroid injections.  Her knees  still  have an dull ache and popping. Has to stand and wait awhile before taking a step.  She has tried  methylPREDNISolone in June and kenalog in March  with about 2 weeks of pain relief from each of them.  No new falls or injuries. She is here to today to discuss her next step in pain management , Her pain is 9/10 on pain scale.     06/27/2024 Interval History of Present Illness: Mary Russo is a right-hand dominant 55 y.o. female who presents today for followup bilateral knee pain. Having significant pain in both knees today, IOVERA was denied and Zilretta was denied by her insurance. She was told by her insurance company that she needed to try and fail at least 2 steroid injections.  No new falls or injuries. She is here to today to try a different CSI in both knees. Her pain is 9/10 on pain scale. She is scheduled to go to [place] for work at the beginning of July, she is hoping that steroid today gives much relief.      The patient is active in none.        Past Medical History:   Past Medical History:   Diagnosis Date    Acid reflux     Anxiety     Arthritis     Carpal tunnel syndrome of right wrist     Cholelithiases     Hypertension     Iron deficiency     Morbid obesity     Preop cardiovascular exam 12/16/2024     Past Surgical History:   Procedure Laterality Date    CHOLECYSTECTOMY      ESOPHAGOGASTRODUODENOSCOPY N/A 10/14/2019    Procedure: ESOPHAGOGASTRODUODENOSCOPY (EGD);  Surgeon: Stefanie Monique MD;  Location: [facility];  Service: Endoscopy;  Laterality: N/A;    ESOPHAGOGASTRODUODENOSCOPY N/A 12/23/2019    Procedure: EGD (ESOPHAGOGASTRODUODENOSCOPY);  Surgeon: Stefanie Monique MD;  Location: [facility];  Service: Endoscopy;  Laterality: N/A;    EYE SURGERY  02/2018     Lasik    HERNIA REPAIR      LAPAROSCOPIC GASTRIC BANDING  10/31/2017    removed    REFRACTIVE SURGERY      TUBAL LIGATION       Family History   Problem Relation Name Age of Onset    Cancer Mother Rosie 49        breast cancer    Heart disease Mother Rosie     Diabetes Mother Rosie     Stroke Mother Rosie     Obesity Mother Rosie     Breast cancer Mother Rosie     Hypertension Mother Rosie     Kidney disease Mother Rosie     Heart disease Father meagan     Obesity Father meagan     Obesity Sister      Obesity Maternal Grandmother      Obesity Sister      Colon cancer Neg Hx      Ovarian cancer Neg Hx       Social History     Socioeconomic History    Marital status:     Number of children: 3   Occupational History     Employer: [employer]   Tobacco Use    Smoking status: Never    Smokeless tobacco: Never   Substance and Sexual Activity    Alcohol use: Not Currently     Alcohol/week: 1.0 standard drink of alcohol     Types: 1 Glasses of wine per week     Comment: occasionally    Drug use: Never    Sexual activity: Yes     Partners: Male     Birth control/protection: Coitus interruptus, Other-see comments     Comment: tubal     Social Drivers of Health     Financial Resource Strain: Low Risk  (1/16/2025)    Overall Financial Resource Strain (CARDIA)     Difficulty of Paying Living Expenses: Not hard at all   Food Insecurity: No Food Insecurity (1/16/2025)    Hunger Vital Sign     Worried About Running Out of Food in the Last Year: Never true     Ran Out of Food in the Last Year: Never true   Transportation Needs: No Transportation Needs (11/13/2023)    PRAPARE - Transportation     Lack of Transportation (Medical): No     Lack of Transportation (Non-Medical): No   Physical Activity: Insufficiently Active (1/16/2025)    Exercise Vital Sign     Days of Exercise per Week: 1 day     Minutes of Exercise per Session: 20 min   Stress: No Stress Concern Present (1/16/2025)     [facility] [place] of [employer]ational Health - Occupational Stress Questionnaire     Feeling of Stress : Only a little   Housing Stability: Low Risk  (11/13/2023)    Housing Stability Vital Sign     Unable to Pay for Housing in the Last Year: No     Number of Places Lived in the Last Year: 1     Unstable Housing in the Last Year: No     Medication List with Changes/Refills   Current Medications    ACETAMINOPHEN (TYLENOL) 500 MG TABLET    Take 500 mg by mouth every 6 (six) hours as needed.    AMLODIPINE (NORVASC) 5 MG TABLET    Take 1 tablet (5 mg total) by mouth once daily.    DICLOFENAC SODIUM (VOLTAREN) 1 % GEL    Apply 2 g topically 4 (four) times daily.    IPRATROPIUM (ATROVENT) 21 MCG (0.03 %) NASAL SPRAY    2 sprays by Each Nostril route 3 (three) times daily as needed for Rhinitis.    LANSOPRAZOLE (PREVACID) 30 MG CAPSULE    Take 1 capsule (30 mg total) by mouth once daily.    LIDOCAINE-PRILOCAINE (EMLA) CREAM    Apply topically.    MELOXICAM (MOBIC) 15 MG TABLET    Take 1 tablet (15 mg total) by mouth once daily. Take with food    METOPROLOL SUCCINATE (TOPROL-XL) 25 MG 24 HR TABLET    Take 1 tablet (25 mg total) by mouth once daily.     Review of patient's allergies indicates:   Allergen Reactions    Adhesive Swelling     Swelling x2 with flu shot and band-aid.  Swells in shape of band-aid.    Lisinopril Other (See Comments)     Cough    Latex, natural rubber Swelling     Localized swelling at site of bandaid/adhesive       Physical Exam:   Body mass index is 44.12 kg/m².    GENERAL: Well appearing, in no acute distress.  HEAD: Normocephalic and atraumatic.  ENT: External ears and nose grossly normal.  EYES: EOMI bilaterally  PULMONARY: Respirations are grossly even and non-labored.  NEURO: Awake, alert, and oriented x 3.  SKIN: No obvious rashes appreciated.  PSYCH: Mood & affect are appropriate.    Detailed MSK exam:     Right knee exam:   -ROM: extension 0, flexion 110  -TTP: Medial joint line and Lateral  joint line  -effusion: trace  -Patellar apprehension negative  -Hunter test negative  -stable to varus and valgus stress tests  -Lachman test negative, anterior drawer test negative, posterior drawer test negative     Left knee exam:   -ROM: extension 0, flexion 110  -TTP: Medial joint line and Lateral joint line  -effusion: trace  -Patellar apprehension negative  -Hunter test negative  -stable to varus and valgus stress tests  -Lachman test negative, anterior drawer test negative, posterior drawer test negative    Imaging:  Sports Medicine US - Guidance for Needle Placement  Skip Rich MD     1/30/2025  2:51 PM  Sports Medicine US - Guidance for Needle Placement    Date/Time: 1/30/2025 2:00 PM    Performed by: Skip Rich MD  Authorized by: Sikp Rich MD  Preparation: Patient was prepped and   draped in the usual sterile fashion.  Local anesthesia used: no    Anesthesia:  Local anesthesia used: no    Sedation:  Patient sedated: no    Patient tolerance: patient tolerated the procedure well with no immediate   complications  Comments: Ultrasound guidance was used for needle localization. Images   were saved and stored for documentation. The appropriate structures were   visualized. Dynamic visualization of the needle was continuous throughout   the procedures and maintained good position.   Mammo Digital Screening Bilat w/ Lemuel  Narrative: Facility:  [facility] [address] [place] [id number] [phone]    Name: Mary Russo    MRN: [id number]    Result:  Mammo Digital Screening Bilat w/ Lemuel    History:  Patient is 54 y.o. and is seen for a screening mammogram.    Films Compared:  Compared to: 01/19/2024 Mammo Digital Screening Bilat w/ Lemuel, 01/03/2023   Mammo Digital Screening Bilat w/ Lemuel, 12/28/2021 Mammo Digital Screening   Bilat w/ Lemuel, and 12/14/2020 Mammo Digital Screening Bilat w/ Lemuel     Findings:  This procedure was performed  using tomosynthesis.   Computer-aided detection was utilized in the interpretation of this   examination.    There are scattered areas of fibroglandular density. There is no evidence   of suspicious masses, microcalcifications or architectural distortion.  Impression:    No mammographic evidence of malignancy.    BI-RADS Category 1: Negative    Recommendation:  Routine screening mammogram in 1 year is recommended.    Your estimated lifetime risk of breast cancer (to age 85) based on   Tyrer-Cuzick risk assessment model is 19.75%.  According to the American   Cancer Society, patients with a lifetime breast cancer risk of 20% or   higher might benefit from supplemental screening tests, such as screening   breast MRI.    Adriel Salazar, DO        Relevant imaging results were reviewed and interpreted by me and per my read shows severe arthritic changes bilaterally.  This was discussed with the patient and / or family today.     Assessment:  Mary Russo is a 55 y.o. female following up for bilateral knee pain.   Plan: patient has tried triamcinolone and methylprednisolone steroid injections, both of which only lasted 2-4 weeks each.   Steroid injection given today (see separate procedure note for details). We discussed the proper protocols after the injection such as no submerging pools, baths tubs, or hot tubs for 24 hr.  Showering is okay today.  We also discussed that blood sugars can be elevated after an injection and asked patient to properly checked her sugars over the next few days and contact their PCP if there are any concerns.  We discussed red flags such as fevers, chills, red, warm, tender joint at the area of injection to please seek medical care immediately.   Follow up with Dr Bedoya. All questions answered.     Bilateral primary osteoarthritis of knee  -     Sports Medicine US - Guidance for Needle Placement  -     Large Joint Aspiration/Injection: bilateral knee           Ultrasound  guidance was used for needle localization. Images were saved and stored for documentation. The appropriate structures were visualized. Dynamic visualization of the needle was continuous throughout the procedures and maintained good position.      Electronically signed:  Skip Rich MD, MPH  05/07/2025  4:36 PM

## 2025-05-07 NOTE — PATIENT INSTRUCTIONS
Assessment:  Mary Russo is a 55 y.o. female   Chief Complaint   Patient presents with    Left Knee - Pain    Right Knee - Pain    Follow-up       Encounter Diagnosis   Name Primary?    Bilateral primary osteoarthritis of knee Yes        Plan:  Ultrasound guided cortisone injections to bilateral knees  We discussed the proper protocols after the injection such as no submerging pools, baths tubs, or hot tubs for 24 hr.  Showering is okay today.  We also discussed that blood sugars can be elevated after an injection and asked patient to properly checked her sugars over the next few days and contact their PCP if there are any concerns.  We discussed red flags such as fevers, chills, red, warm, tender joint at the area of injection to please seek medical care immediately.    Follow up as needed    Follow-up: as needed.    Thank you for choosing [facility] and Dr. Skip Rich for your orthopedic & sports medicine care. It is our goal to provide you with exceptional care that will help keep you healthy, active, and get you back in the game.    Please do not hesitate to reach out to us via email, phone, or MyChart with any questions, concerns, or feedback.    If you felt that you received exemplary care today, please consider leaving us feedback on [email or web address] at:  [email or web address]    If you are experiencing pain/discomfort ,or have questions after 5pm and would like to be connected to the [facility] on-call team, please call this number and specify which Sports Medicine provider is treating you: [phone]

## 2025-05-07 NOTE — PROCEDURES
Sports Medicine US - Guidance for Needle Placement    Date/Time: 5/7/2025 11:40 AM    Performed by: Skip Rich MD  Authorized by: Skip Rich MD  Preparation: Patient was prepped and draped in the usual sterile fashion.  Local anesthesia used: no    Anesthesia:  Local anesthesia used: no    Sedation:  Patient sedated: no    Patient tolerance: patient tolerated the procedure well with no immediate complications  Comments: Ultrasound guidance was used for needle localization. Images were saved and stored for documentation. The appropriate structures were visualized. Dynamic visualization of the needle was continuous throughout the procedures and maintained good position.

## 2025-05-07 NOTE — PROCEDURES
Large Joint Aspiration/Injection: bilateral knee    Date/Time: 5/7/2025 11:40 AM    Performed by: Skip Rich MD  Authorized by: Skip Rich MD    Consent Done?:  Yes (Verbal)  Indications:  Arthritis and pain  Site marked: the procedure site was marked    Timeout: prior to procedure the correct patient, procedure, and site was verified    Prep: patient was prepped and draped in usual sterile fashion    Local anesthetic:  Bupivacaine 0.5% without epinephrine and lidocaine 1% without epinephrine    Details:  Needle Size:  21 G  Ultrasonic Guidance for needle placement?: Yes    Images are saved and documented.  Approach:  Lateral (superior)  Location:  Knee  Laterality:  Bilateral  Site:  Bilateral knee  Medications (Right):  40 mg triamcinolone acetonide 40 mg/mL  Medications (Left):  40 mg triamcinolone acetonide 40 mg/mL  Patient tolerance:  Patient tolerated the procedure well with no immediate complications     Ultrasound guidance was used for needle localization. Images were saved and stored for documentation. The appropriate structures were visualized. Dynamic visualization of the needle was continuous throughout the procedures and maintained good position.

## 2025-05-08 ENCOUNTER — TELEPHONE (OUTPATIENT)
Dept: SPORTS MEDICINE | Facility: CLINIC | Age: 55
End: 2025-05-08
Payer: COMMERCIAL

## 2025-05-08 NOTE — TELEPHONE ENCOUNTER
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

## 2025-05-19 ENCOUNTER — OFFICE VISIT (OUTPATIENT)
Dept: INTERNAL MEDICINE | Facility: CLINIC | Age: 55
End: 2025-05-19
Payer: COMMERCIAL

## 2025-05-19 VITALS
HEART RATE: 97 BPM | OXYGEN SATURATION: 99 % | SYSTOLIC BLOOD PRESSURE: 120 MMHG | TEMPERATURE: 98 F | BODY MASS INDEX: 41.88 KG/M2 | DIASTOLIC BLOOD PRESSURE: 80 MMHG | WEIGHT: 260.56 LBS | HEIGHT: 66 IN

## 2025-05-19 DIAGNOSIS — E66.01 MORBID OBESITY WITH BMI OF 40.0-44.9, ADULT: ICD-10-CM

## 2025-05-19 DIAGNOSIS — D50.9 IRON DEFICIENCY ANEMIA, UNSPECIFIED IRON DEFICIENCY ANEMIA TYPE: ICD-10-CM

## 2025-05-19 DIAGNOSIS — K21.9 GASTROESOPHAGEAL REFLUX DISEASE, UNSPECIFIED WHETHER ESOPHAGITIS PRESENT: ICD-10-CM

## 2025-05-19 DIAGNOSIS — Z90.3 S/P GASTRIC SLEEVE PROCEDURE: ICD-10-CM

## 2025-05-19 DIAGNOSIS — F41.9 ANXIETY AND DEPRESSION: ICD-10-CM

## 2025-05-19 DIAGNOSIS — F32.A ANXIETY AND DEPRESSION: ICD-10-CM

## 2025-05-19 DIAGNOSIS — Z00.00 ROUTINE GENERAL MEDICAL EXAMINATION AT A HEALTH CARE FACILITY: Primary | ICD-10-CM

## 2025-05-19 DIAGNOSIS — E55.9 VITAMIN D DEFICIENCY: ICD-10-CM

## 2025-05-19 DIAGNOSIS — J30.2 SEASONAL ALLERGIC RHINITIS, UNSPECIFIED TRIGGER: ICD-10-CM

## 2025-05-19 DIAGNOSIS — I10 ESSENTIAL HYPERTENSION: ICD-10-CM

## 2025-05-19 PROBLEM — Z01.810 PREOP CARDIOVASCULAR EXAM: Status: RESOLVED | Noted: 2024-12-16 | Resolved: 2025-05-19

## 2025-05-19 PROCEDURE — 1160F RVW MEDS BY RX/DR IN RCRD: CPT | Mod: CPTII,S$GLB,, | Performed by: PHYSICIAN ASSISTANT

## 2025-05-19 PROCEDURE — 3079F DIAST BP 80-89 MM HG: CPT | Mod: CPTII,S$GLB,, | Performed by: PHYSICIAN ASSISTANT

## 2025-05-19 PROCEDURE — 99396 PREV VISIT EST AGE 40-64: CPT | Mod: S$GLB,,, | Performed by: PHYSICIAN ASSISTANT

## 2025-05-19 PROCEDURE — 1159F MED LIST DOCD IN RCRD: CPT | Mod: CPTII,S$GLB,, | Performed by: PHYSICIAN ASSISTANT

## 2025-05-19 PROCEDURE — 99999 PR PBB SHADOW E&M-EST. PATIENT-LVL IV: CPT | Mod: PBBFAC,,, | Performed by: PHYSICIAN ASSISTANT

## 2025-05-19 PROCEDURE — 3008F BODY MASS INDEX DOCD: CPT | Mod: CPTII,S$GLB,, | Performed by: PHYSICIAN ASSISTANT

## 2025-05-19 PROCEDURE — 3074F SYST BP LT 130 MM HG: CPT | Mod: CPTII,S$GLB,, | Performed by: PHYSICIAN ASSISTANT

## 2025-05-19 RX ORDER — METOPROLOL SUCCINATE 25 MG/1
25 TABLET, EXTENDED RELEASE ORAL DAILY
Qty: 90 TABLET | Refills: 3 | Status: SHIPPED | OUTPATIENT
Start: 2025-05-19 | End: 2026-05-19

## 2025-05-19 RX ORDER — SERTRALINE HYDROCHLORIDE 25 MG/1
12.5 TABLET, FILM COATED ORAL DAILY
Qty: 45 TABLET | Refills: 3 | Status: SHIPPED | OUTPATIENT
Start: 2025-05-19 | End: 2026-05-19

## 2025-05-19 RX ORDER — AMLODIPINE BESYLATE 5 MG/1
5 TABLET ORAL DAILY
Qty: 90 TABLET | Refills: 3 | Status: SHIPPED | OUTPATIENT
Start: 2025-05-19 | End: 2026-05-19

## 2025-05-19 NOTE — PROGRESS NOTES
Subjective:       Patient ID: Mary Russo is a 55 y.o. female.    Chief Complaint: Annual Exam    CHIEF COMPLAINT:  - Mary presents for an annual wellness visit and to discuss recent lab results.    HPI:  - Mary reports feeling dehydrated at times in the past. She mentions previous IV hydration treatment. Mary is preparing for right knee replacement surgery, anticipated in late September or October. She recently received a knee cortisone injection and is aware of the need to wait at least 3 months before surgery. Mary indicates that both knees require replacement, with the right knee being more severely affected. She has pain when the effects of the cortisone wear off.  - Mary has been attempting to increase her protein intake in preparation for weight loss surgery, finding it challenging to consume sufficient protein. She reports drinking only 2-3 bottles (16.9 oz) of water per day. Mary participated in water aerobics until November when she sustained a muscle strain during a high-impact session. She recently resumed with low-impact water walking sessions.  - Regarding her mental health, patient discontinued Prozac about 6 months ago due to dissatisfaction, and has resumed taking Zoloft at a dose of 12.5 mg (half of a 25 mg tablet). She reports this dosage is effective for managing her anxiety.  - Mary denies any significant changes in her health since her last visit.    MEDICATIONS:  - Amlodipine for blood pressure  - Metoprolol for blood pressure  - Vitamin D supplement, 1000 IU, daily  - Zoloft (sertraline), 25 mg, half tablet daily, for anxiety  - Discontinued Prozac (fluoxetine) about 6 months ago  - Discontinued Zoloft (sertraline) in 2023, then restarted at 25 mg (half tablet daily)    MEDICAL HISTORY:  - Iron deficiency anemia: Past history  - Hypertension    SURGICAL HISTORY:  - Right knee replacement: Planned for late September or October, awaiting appointment with  Dr. 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   HENT:  Negative for congestion, dental problem, ear pain, hearing loss, rhinorrhea and trouble swallowing.    Eyes:  Negative for pain and visual disturbance.   Respiratory:  Negative for cough and shortness of breath.    Cardiovascular:  Negative for chest pain, palpitations and leg swelling.   Gastrointestinal:  Negative for abdominal distention, abdominal pain, blood in stool, constipation, diarrhea, nausea and vomiting.   Genitourinary:  Negative for difficulty urinating and pelvic pain.   Musculoskeletal:  Negative for arthralgias and myalgias.   Skin:  Negative for rash.   Neurological:  Negative for dizziness, weakness, numbness and headaches.   Hematological:  Negative for adenopathy. Does not bruise/bleed easily.   Psychiatric/Behavioral:  Negative for dysphoric mood and sleep disturbance. The patient is not nervous/anxious.        Objective:   Laboratory Reviewed ({Yes)    Lab Results   Component Value Date     11/27/2024    K 4.3 11/27/2024     11/27/2024    CO2 24 11/27/2024    GLU 73 11/27/2024    BUN 12 11/27/2024    CREATININE 0.85 11/27/2024    CALCIUM 10.0 11/27/2024    PROT 7.7 07/29/2021    ALBUMIN 3.7 (L) 11/27/2024    BILITOT 0.4 11/27/2024    ALKPHOS 49 (L) 01/13/2022    AST 11 11/27/2024    ALT 6 11/27/2024    EGFRNORACEVR 82 11/27/2024    ANIONGAP 6 (L) 07/29/2021       Lab Results   Component Value Date    CHOL 212 (H) 05/24/2024    TRIG 72 05/24/2024    HDL 61 05/24/2024    LDLCALC 138 (H) 05/24/2024       Lab Results   Component Value Date    WBC 7.0 11/27/2024    RBC 4.02 11/27/2024    HGB 11.8 11/27/2024    HCT 37.1 11/27/2024    MCV 92 11/27/2024     11/27/2024    GRAN 3.5 07/29/2021    GRAN 52.8 07/29/2021    LYMPH 2.5 11/27/2024    MONO 8 11/27/2024     MONO 0.5 11/27/2024    EOSINOPHIL 3 11/27/2024    BASOPHIL 1 11/27/2024       Lab Results   Component Value Date    TSH 0.986 05/24/2024       Lab Results   Component Value Date    HGBA1C 5.1 05/24/2024       Lab Results   Component Value Date    LABMICR 6.7 10/01/2018    CREATRANDUR 142.3 10/01/2018       Lab Results   Component Value Date    TBXWDCLM11XC 31.1 11/27/2024         Physical Exam  Vitals reviewed.   Constitutional:       General: She is not in acute distress.     Appearance: Normal appearance. She is well-developed. She is obese. She is not ill-appearing or toxic-appearing.   HENT:      Head: Normocephalic and atraumatic.      Right Ear: Tympanic membrane, ear canal and external ear normal.      Left Ear: Tympanic membrane, ear canal and external ear normal.      Nose: Nose normal.      Mouth/Throat:      Mouth: Mucous membranes are dry.      Pharynx: Oropharynx is clear.   Eyes:      General: Lids are normal. No scleral icterus.     Extraocular Movements: Extraocular movements intact.      Conjunctiva/sclera: Conjunctivae normal.      Pupils: Pupils are equal, round, and reactive to light.   Cardiovascular:      Rate and Rhythm: Normal rate and regular rhythm.      Pulses: Normal pulses.      Heart sounds: Normal heart sounds. No murmur heard.     No friction rub. No gallop.   Pulmonary:      Effort: Pulmonary effort is normal. No respiratory distress.      Breath sounds: Normal breath sounds. No stridor. No decreased breath sounds, wheezing, rhonchi or rales.   Abdominal:      General: Abdomen is flat. Bowel sounds are normal. There is no distension.      Palpations: Abdomen is soft. There is no mass.      Tenderness: There is no abdominal tenderness. There is no guarding or rebound.      Hernia: No hernia is present.   Musculoskeletal:         General: Normal range of motion.      Cervical back: Normal range of motion and neck supple. No tenderness.      Right lower leg: No edema.      Left lower  leg: No edema.   Lymphadenopathy:      Cervical: No cervical adenopathy.   Skin:     General: Skin is warm and dry.   Neurological:      General: No focal deficit present.      Mental Status: She is alert and oriented to person, place, and time. Mental status is at baseline.      Cranial Nerves: No cranial nerve deficit.      Gait: Gait normal.   Psychiatric:         Mood and Affect: Mood and affect normal.         Behavior: Behavior normal.         Thought Content: Thought content normal.         Judgment: Judgment normal.         Assessment:       1. Routine general medical examination at a health care facility    2. Anxiety and depression    3. Seasonal allergic rhinitis, unspecified trigger    4. Essential hypertension    5. Iron deficiency anemia, unspecified iron deficiency anemia type    6. Morbid obesity with BMI of 40.0-44.9, adult    7. S/P gastric sleeve procedure    8. Vitamin D deficiency    9. Gastroesophageal reflux disease, unspecified whether esophagitis present        Plan:   1. Routine general medical examination at a health care facility    2. Anxiety and depression  -     sertraline (ZOLOFT) 25 MG tablet; Take 0.5 tablets (12.5 mg total) by mouth once daily.  Dispense: 45 tablet; Refill: 3    3. Seasonal allergic rhinitis, unspecified trigger    4. Essential hypertension  -     amLODIPine (NORVASC) 5 MG tablet; Take 1 tablet (5 mg total) by mouth once daily.  Dispense: 90 tablet; Refill: 3  -     metoprolol succinate (TOPROL-XL) 25 MG 24 hr tablet; Take 1 tablet (25 mg total) by mouth once daily.  Dispense: 90 tablet; Refill: 3  -     Comprehensive Metabolic Panel; Future; Expected date: 11/19/2025    5. Iron deficiency anemia, unspecified iron deficiency anemia type    6. Morbid obesity with BMI of 40.0-44.9, adult    7. S/P gastric sleeve procedure  Overview:  Dr. London, January 2022      8. Vitamin D deficiency  Overview:  Continue supplement       9. Gastroesophageal reflux disease,  unspecified whether esophagitis present  Overview:  Stable on Protonix            Assessment & Plan    IMPRESSION:  - Reviewed lab results: vitamin D level improved but still below optimal range.  - Albumin level slightly low, potentially indicating insufficient dietary protein intake.  - BP management effective with current medication regimen (amlodipine and metoprolol) based on vital signs.  - Iron levels WNL.  - Increased water intake necessary rather than IV hydration; provided information on proper hydration techniques.    HYPERTENSION:   Monitored vitals and blood pressure, which are stable and look great.   Continued amlodipine and metoprolol for blood pressure management with prescription refills sent.   Ordered renal function lab test to be completed in 6 months (non-fasting) to monitor kidney function due to blood pressure medication.    ANXIETY DISORDER:   Mary is currently taking Zoloft 25 mg (half tablet daily), which is effectively managing anxiety.   Discussed potential dosage increase if needed in the future.   Sent prescription to [facility].    OSTEOARTHRITIS OF RIGHT KNEE:   Mary is preparing for right knee replacement surgery and has received a cortisone injection.   Currently observing the required three-month waiting period before proceeding with surgery.   Discussed pre-operative requirements and timing.    DEHYDRATION:   Mary reports feeling dehydrated, currently consuming 2-3 bottles (16.9 oz) of water daily.   Advised increasing intake to 4-5 bottles per day.   Recommend using flavored jones, liquid IV, or a reusable water bottle to encourage consistent hydration.    LOW ALBUMIN:   Monitored albumin levels, which were slightly low, indicating possible protein deficiency.   Advised patient to increase dietary protein intake through food sources or supplements such as protein drinks or powders.    PREVENTATIVE CARE:   Recommend pneumonia and shingles vaccinations as part of preventative  care; discussed benefits including prevention rates and potential side effects.   Explained importance of maintaining adequate vitamin D levels for overall health, including improved recovery from respiratory infections.   Started vitamin D supplement 1,000 IU daily.           Health Maintenance reviewed/updated.      Check-out note:  Please follow up in 6 months for 6 month f/u In-person with Dr. Aiken.  Labs: Non-fasting lab PTA  Additional orders: Print AVS      This note was generated with the assistance of ambient listening technology. Verbal consent was obtained by the patient and accompanying visitor(s) for the recording of patient appointment to facilitate this note. I attest to having reviewed and edited the generated note for accuracy, though some syntax or spelling errors may persist. Please contact the author of this note for any clarification.

## 2025-05-19 NOTE — PATIENT INSTRUCTIONS
You can get your Pneumococcal (pneumonia) vaccine at the pharmacy.      You can get your Shingles vaccine at the pharmacy.

## 2025-06-23 ENCOUNTER — OFFICE VISIT (OUTPATIENT)
Dept: CARDIOLOGY | Facility: CLINIC | Age: 55
End: 2025-06-23
Payer: COMMERCIAL

## 2025-06-23 VITALS
DIASTOLIC BLOOD PRESSURE: 71 MMHG | OXYGEN SATURATION: 99 % | BODY MASS INDEX: 42.22 KG/M2 | HEIGHT: 66 IN | HEART RATE: 94 BPM | SYSTOLIC BLOOD PRESSURE: 108 MMHG | WEIGHT: 262.69 LBS | RESPIRATION RATE: 16 BRPM

## 2025-06-23 DIAGNOSIS — K21.00 GASTROESOPHAGEAL REFLUX DISEASE WITH ESOPHAGITIS WITHOUT HEMORRHAGE: ICD-10-CM

## 2025-06-23 DIAGNOSIS — I10 ESSENTIAL HYPERTENSION: Primary | ICD-10-CM

## 2025-06-23 DIAGNOSIS — E66.01 MORBID OBESITY WITH BMI OF 40.0-44.9, ADULT: ICD-10-CM

## 2025-06-23 PROCEDURE — 99999 PR PBB SHADOW E&M-EST. PATIENT-LVL IV: CPT | Mod: PBBFAC,,, | Performed by: INTERNAL MEDICINE

## 2025-06-23 PROCEDURE — 3008F BODY MASS INDEX DOCD: CPT | Mod: CPTII,S$GLB,, | Performed by: INTERNAL MEDICINE

## 2025-06-23 PROCEDURE — 3074F SYST BP LT 130 MM HG: CPT | Mod: CPTII,S$GLB,, | Performed by: INTERNAL MEDICINE

## 2025-06-23 PROCEDURE — 1159F MED LIST DOCD IN RCRD: CPT | Mod: CPTII,S$GLB,, | Performed by: INTERNAL MEDICINE

## 2025-06-23 PROCEDURE — 99214 OFFICE O/P EST MOD 30 MIN: CPT | Mod: S$GLB,,, | Performed by: INTERNAL MEDICINE

## 2025-06-23 PROCEDURE — 3078F DIAST BP <80 MM HG: CPT | Mod: CPTII,S$GLB,, | Performed by: INTERNAL MEDICINE

## 2025-06-23 NOTE — PROGRESS NOTES
Subjective:   Patient ID:  Mary Russo is a 55 y.o. female who presents for follow-up of No chief complaint on file.  12-24  Pt presents for preop Cv exam R knee replacement. EKG is normal.Patient denies CP, angina or anginal equivalent.  Pt still walks without sx. Pt states echo and stress test at [facility]. ? Hx of MVP     CRF- weight, HTN, family hx of CAD    Today 6-23-25  Patient denies CP, angina or anginal equivalent.  Pt awaiting surgery  HPI  Hypertension  This is a chronic problem. The current episode started more than 1 year ago. The problem has been gradually improving since onset. The problem is controlled. Pertinent negatives include no chest pain, palpitations or shortness of breath. Past treatments include beta blockers and angiotensin blockers. The current treatment provides moderate improvement. There are no compliance problems.    Review of Systems   Constitutional: Negative. Negative for weight gain.   HENT: Negative.     Eyes: Negative.    Cardiovascular: Negative.  Negative for chest pain, leg swelling and palpitations.   Respiratory: Negative.  Negative for shortness of breath.    Endocrine: Negative.    Hematologic/Lymphatic: Negative.    Skin: Negative.    Musculoskeletal:  Negative for muscle weakness.   Gastrointestinal: Negative.    Genitourinary: Negative.    Neurological: Negative.  Negative for dizziness.   Psychiatric/Behavioral: Negative.     Allergic/Immunologic: Negative.    All other systems reviewed and are negative.    Family History   Problem Relation Name Age of Onset    Cancer Mother Rosie 49        breast cancer    Heart disease Mother Rosie     Diabetes Mother Rosie     Stroke Mother Rosie     Obesity Mother Rosie     Breast cancer Mother Rosie     Hypertension Mother Rosie     Kidney disease Mother Rosie     Heart disease Father meagan     Obesity Father meagan     Obesity Sister      Obesity Maternal Grandmother      Obesity Sister      Colon cancer Neg  Hx      Ovarian cancer Neg Hx       Past Medical History:   Diagnosis Date    Acid reflux     Anxiety     Arthritis     Carpal tunnel syndrome of right wrist     Cholelithiases     Hypertension     Iron deficiency     Morbid obesity     Preop cardiovascular exam 12/16/2024     Social History[1]  Medications Ordered Prior to Encounter[2]  Review of patient's allergies indicates:   Allergen Reactions    Adhesive Swelling     Swelling x2 with flu shot and band-aid.  Swells in shape of band-aid.    Lisinopril Other (See Comments)     Cough    Latex, natural rubber Swelling     Localized swelling at site of bandaid/adhesive       Objective:     Physical Exam  Vitals and nursing note reviewed.   Constitutional:       Appearance: She is well-developed.   HENT:      Head: Normocephalic and atraumatic.   Eyes:      Conjunctiva/sclera: Conjunctivae normal.      Pupils: Pupils are equal, round, and reactive to light.   Cardiovascular:      Rate and Rhythm: Normal rate and regular rhythm.      Pulses: Intact distal pulses.      Heart sounds: Normal heart sounds.   Pulmonary:      Effort: Pulmonary effort is normal.      Breath sounds: Normal breath sounds.   Abdominal:      General: Bowel sounds are normal.      Palpations: Abdomen is soft.   Musculoskeletal:         General: Normal range of motion.      Cervical back: Normal range of motion and neck supple.   Skin:     General: Skin is warm and dry.   Neurological:      Mental Status: She is alert and oriented to person, place, and time.         Assessment:     1. Essential hypertension    2. Morbid obesity with BMI of 40.0-44.9, adult    3. Gastroesophageal reflux disease with esophagitis without hemorrhage        Plan:     Essential hypertension    Morbid obesity with BMI of 40.0-44.9, adult    Gastroesophageal reflux disease with esophagitis without hemorrhage      Continue metoprolol, norvasc- HTN   Pt cleared for procedure/surgery at moderate CV risk        [1]   Social  History  Socioeconomic History    Marital status:     Number of children: 3   Occupational History     Employer: [employer]   Tobacco Use    Smoking status: Never    Smokeless tobacco: Never   Substance and Sexual Activity    Alcohol use: Not Currently     Alcohol/week: 1.0 standard drink of alcohol     Types: 1 Glasses of wine per week     Comment: occasionally    Drug use: Never    Sexual activity: Yes     Partners: Male     Birth control/protection: Coitus interruptus, Other-see comments     Comment: tubal     Social Drivers of Health     Financial Resource Strain: Low Risk  (5/17/2025)    Overall Financial Resource Strain (CARDIA)     Difficulty of Paying Living Expenses: Not hard at all   Food Insecurity: No Food Insecurity (5/17/2025)    Hunger Vital Sign     Worried About Running Out of Food in the Last Year: Never true     Ran Out of Food in the Last Year: Never true   Transportation Needs: No Transportation Needs (5/17/2025)    PRAPARE - Transportation     Lack of Transportation (Medical): No     Lack of Transportation (Non-Medical): No   Physical Activity: Insufficiently Active (5/17/2025)    Exercise Vital Sign     Days of Exercise per Week: 2 days     Minutes of Exercise per Session: 30 min   Stress: No Stress Concern Present (5/17/2025)    [employer] [place] [employer] - Occupational Stress Questionnaire     Feeling of Stress : Only a little   Housing Stability: Low Risk  (5/17/2025)    Housing Stability Vital Sign     Unable to Pay for Housing in the Last Year: No     Number of Times Moved in the Last Year: 0     Homeless in the Last Year: No   [2]   Current Outpatient Medications on File Prior to Visit   Medication Sig Dispense Refill    acetaminophen (TYLENOL) 500 MG tablet Take 500 mg by mouth every 6 (six) hours as needed.      amLODIPine (NORVASC) 5 MG tablet Take 1 tablet (5 mg total) by mouth once daily. 90 tablet 3    diclofenac sodium  (VOLTAREN) 1 % Gel Apply 2 g topically 4 (four) times daily. 1 each 2    ipratropium (ATROVENT) 21 mcg (0.03 %) nasal spray 2 sprays by Each Nostril route 3 (three) times daily as needed for Rhinitis. 30 mL 0    lansoprazole (PREVACID) 30 MG capsule Take 1 capsule (30 mg total) by mouth once daily. 90 capsule 3    LIDOcaine-prilocaine (EMLA) cream Apply topically.      meloxicam (MOBIC) 15 MG tablet Take 1 tablet (15 mg total) by mouth once daily. Take with food 90 tablet 3    metoprolol succinate (TOPROL-XL) 25 MG 24 hr tablet Take 1 tablet (25 mg total) by mouth once daily. 90 tablet 3    sertraline (ZOLOFT) 25 MG tablet Take 0.5 tablets (12.5 mg total) by mouth once daily. 45 tablet 3     No current facility-administered medications on file prior to visit.

## 2025-07-25 ENCOUNTER — HOSPITAL ENCOUNTER (OUTPATIENT)
Dept: RADIOLOGY | Facility: HOSPITAL | Age: 55
Discharge: HOME | End: 2025-07-25
Attending: ORTHOPAEDIC SURGERY
Payer: COMMERCIAL

## 2025-07-25 ENCOUNTER — OFFICE VISIT (OUTPATIENT)
Dept: ORTHOPEDICS | Facility: CLINIC | Age: 55
End: 2025-07-25
Payer: COMMERCIAL

## 2025-07-25 VITALS — HEIGHT: 66 IN | BODY MASS INDEX: 42.24 KG/M2 | WEIGHT: 262.81 LBS

## 2025-07-25 DIAGNOSIS — M25.461 PAIN AND SWELLING OF RIGHT KNEE: Primary | ICD-10-CM

## 2025-07-25 DIAGNOSIS — M17.11 ARTHRITIS OF KNEE, RIGHT: ICD-10-CM

## 2025-07-25 DIAGNOSIS — M25.561 PAIN AND SWELLING OF RIGHT KNEE: ICD-10-CM

## 2025-07-25 DIAGNOSIS — M21.161 ACQUIRED VARUS DEFORMITY KNEE, RIGHT: ICD-10-CM

## 2025-07-25 DIAGNOSIS — M17.11 ARTHRITIS OF KNEE, RIGHT: Primary | ICD-10-CM

## 2025-07-25 DIAGNOSIS — M25.561 PAIN AND SWELLING OF RIGHT KNEE: Primary | ICD-10-CM

## 2025-07-25 DIAGNOSIS — Z01.818 PREOPERATIVE EXAMINATION: ICD-10-CM

## 2025-07-25 DIAGNOSIS — M21.162 ACQUIRED VARUS DEFORMITY KNEE, LEFT: ICD-10-CM

## 2025-07-25 DIAGNOSIS — Z01.818 PREOP TESTING: ICD-10-CM

## 2025-07-25 DIAGNOSIS — M25.461 PAIN AND SWELLING OF RIGHT KNEE: ICD-10-CM

## 2025-07-25 DIAGNOSIS — M17.12 ARTHRITIS OF KNEE, LEFT: ICD-10-CM

## 2025-07-25 DIAGNOSIS — E66.01 MORBID OBESITY WITH BMI OF 40.0-44.9, ADULT: ICD-10-CM

## 2025-07-25 PROCEDURE — 93971 EXTREMITY STUDY: CPT | Mod: 26,RT,, | Performed by: STUDENT IN AN ORGANIZED HEALTH CARE EDUCATION/TRAINING PROGRAM

## 2025-07-25 PROCEDURE — 93971 EXTREMITY STUDY: CPT | Mod: TC,RT

## 2025-07-25 PROCEDURE — 99999 PR PBB SHADOW E&M-EST. PATIENT-LVL IV: CPT | Mod: PBBFAC,,, | Performed by: ORTHOPAEDIC SURGERY

## 2025-07-25 RX ORDER — TRIAMCINOLONE ACETONIDE 40 MG/ML
80 INJECTION, SUSPENSION INTRA-ARTICULAR; INTRAMUSCULAR
Status: DISCONTINUED | OUTPATIENT
Start: 2025-07-25 | End: 2025-07-25 | Stop reason: HOSPADM

## 2025-07-25 RX ADMIN — TRIAMCINOLONE ACETONIDE 80 MG: 40 INJECTION, SUSPENSION INTRA-ARTICULAR; INTRAMUSCULAR at 07:07

## 2025-07-25 NOTE — PROCEDURES
Large Joint Aspiration/Injection: L knee    Date/Time: 7/25/2025 7:20 AM    Performed by: Torito Bedoya MD  Authorized by: Torito Bedoya MD    Site marked: the procedure site was marked    Timeout: prior to procedure the correct patient, procedure, and site was verified    Prep: patient was prepped and draped in usual sterile fashion      Local anesthesia used?: Yes    Local anesthetic:  Topical anesthetic and lidocaine 1% without epinephrine    Details:  Needle Size:  22 G  Ultrasonic Guidance for needle placement?: No    Approach:  Anterolateral  Location:  Knee  Site:  L knee  Medications:  80 mg triamcinolone acetonide 40 mg/mL  Patient tolerance:  Patient tolerated the procedure well with no immediate complications     Left knee Zilretta

## 2025-07-25 NOTE — PROGRESS NOTES
Subjective:     Patient ID: Mary Russo is a 55 y.o. female.    Chief Complaint: Pain of the Right Knee and Pain of the Left Knee    HPI:  Bilateral knee pain with the right worse than the left   10/03/2024   Patient stated started with pain around 7 years ago.  She has seen by an orthopedic surgeon who told her she has to lose a lot of weight and to come and see him when she is 62 years of age.  Stated of the last 3 years things got worse and had received numerous treatment in the form of Celebrex which is not helping Voltaren gel which does not help as much as compound cream, received numerous injections of Kenalog as well as Depo-Medrol.  She was denied Zilretta by the insurance requiring to wait 6 months after the last injection.  She has been through physical therapy at [facility] on [facility] and also underwent aquatic therapy.  She does go 3 to 4 times a week to the gym to do aquatic exercise program.  She does take Tylenol arthritis 2 tablets twice a day as needed.  At work at the [employer] she has a desk and she has a under the desk pedals and exercises and gets up and walk around.  IO vera was not approved by the insurance.  This is affecting activities of daily living specially the right knee is worse than the left.  She had lost around 50 lb over the years she is down to 265 lb with BMI 42.77  Her knee pain is occasionally giving her low back pain but does not have any history of injury at this time.  No fever no chills no shortness of breath or difficulty with chewing swallowing loss of bowel bladder control blurry vision double vision loss sense smell or taste or numbness or tingling in the legs at this point  Pain 5/10      07/25/2025   NICKEL [facility]    Bilateral knee pain with the right worse than the left   Pain is 8/10   Patient failed numerous non operative treatments including Kenalog injections Zilretta Depo-Medrol.  We had discussed previously knee replacements in case things do not  improve.  She is at a point that she wants to proceed with the right knee 1st.  She asked when she can have the left knee I did tell her usually 3 months afterwards if everything is successful.  We went over the pros and cons of surgery in details the risks involved all questions asked and answered we went over the instructions in details also.  Her pain is 8/10     Past Medical History:   Diagnosis Date    Acid reflux     Anxiety     Arthritis     Carpal tunnel syndrome of right wrist     Cholelithiases     Hypertension     Iron deficiency     Morbid obesity     Preop cardiovascular exam 12/16/2024     Past Surgical History:   Procedure Laterality Date    CHOLECYSTECTOMY      ESOPHAGOGASTRODUODENOSCOPY N/A 10/14/2019    Procedure: ESOPHAGOGASTRODUODENOSCOPY (EGD);  Surgeon: Stefanie Monique MD;  Location: [facility];  Service: Endoscopy;  Laterality: N/A;    ESOPHAGOGASTRODUODENOSCOPY N/A 12/23/2019    Procedure: EGD (ESOPHAGOGASTRODUODENOSCOPY);  Surgeon: Stefanie Monique MD;  Location: [facility];  Service: Endoscopy;  Laterality: N/A;    EYE SURGERY  02/2018    Lasik    HERNIA REPAIR      LAPAROSCOPIC GASTRIC BANDING  10/31/2017    removed    REFRACTIVE SURGERY      TUBAL LIGATION       Family History   Problem Relation Name Age of Onset    Cancer Mother Rosie 49        breast cancer    Heart disease Mother Rosie     Diabetes Mother Rosie     Stroke Mother [place]     Obesity Mother Rosie     Breast cancer Mother [place]     Hypertension Mother [place]     Kidney disease Mother Rosie     Heart disease Father meagan     Obesity Father meagan     Obesity Sister      Obesity Maternal Grandmother      Obesity Sister      Colon cancer Neg Hx      Ovarian cancer Neg Hx       Social History     Socioeconomic History    Marital status:     Number of children: 3   Occupational History     Employer: [employer]   Tobacco Use    Smoking status: Never    Smokeless tobacco:  Never   Substance and Sexual Activity    Alcohol use: Not Currently     Alcohol/week: 1.0 standard drink of alcohol     Types: 1 Glasses of wine per week     Comment: occasionally    Drug use: Never    Sexual activity: Yes     Partners: Male     Birth control/protection: Coitus interruptus, Other-see comments     Comment: tubal     Social Drivers of Health     Financial Resource Strain: Low Risk  (5/17/2025)    Overall Financial Resource Strain (CARDIA)     Difficulty of Paying Living Expenses: Not hard at all   Food Insecurity: No Food Insecurity (5/17/2025)    Hunger Vital Sign     Worried About Running Out of Food in the Last Year: Never true     Ran Out of Food in the Last Year: Never true   Transportation Needs: No Transportation Needs (5/17/2025)    PRAPARE - Transportation     Lack of Transportation (Medical): No     Lack of Transportation (Non-Medical): No   Physical Activity: Insufficiently Active (5/17/2025)    Exercise Vital Sign     Days of Exercise per Week: 2 days     Minutes of Exercise per Session: 30 min   Stress: No Stress Concern Present (5/17/2025)    [employer] - Occupational Stress Questionnaire     Feeling of Stress : Only a little   Housing Stability: Low Risk  (5/17/2025)    Housing Stability Vital Sign     Unable to Pay for Housing in the Last Year: No     Number of Times Moved in the Last Year: 0     Homeless in the Last Year: No     Medication List with Changes/Refills   Current Medications    ACETAMINOPHEN (TYLENOL) 500 MG TABLET    Take 500 mg by mouth every 6 (six) hours as needed.    AMLODIPINE (NORVASC) 5 MG TABLET    Take 1 tablet (5 mg total) by mouth once daily.    LANSOPRAZOLE (PREVACID) 30 MG CAPSULE    Take 1 capsule (30 mg total) by mouth once daily.    LIDOCAINE-PRILOCAINE (EMLA) CREAM    Apply topically.    MELOXICAM (MOBIC) 15 MG TABLET    Take 1 tablet (15 mg total) by mouth once daily. Take with food    METOPROLOL SUCCINATE (TOPROL-XL) 25 MG  24 HR TABLET    Take 1 tablet (25 mg total) by mouth once daily.    SERTRALINE (ZOLOFT) 25 MG TABLET    Take 0.5 tablets (12.5 mg total) by mouth once daily.   Discontinued Medications    DICLOFENAC SODIUM (VOLTAREN) 1 % GEL    Apply 2 g topically 4 (four) times daily.    IPRATROPIUM (ATROVENT) 21 MCG (0.03 %) NASAL SPRAY    2 sprays by Each Nostril route 3 (three) times daily as needed for Rhinitis.     Review of patient's allergies indicates:   Allergen Reactions    Adhesive Swelling     Swelling x2 with flu shot and band-aid.  Swells in shape of band-aid.    Lisinopril Other (See Comments)     Cough    Nickel Dermatitis    Latex, natural rubber Swelling     Localized swelling at site of bandaid/adhesive     Review of Systems   Constitutional: Negative for decreased appetite.   HENT:  Negative for tinnitus.    Eyes:  Negative for double vision.   Cardiovascular:  Negative for chest pain.   Respiratory:  Negative for wheezing.    Hematologic/Lymphatic: Negative for bleeding problem.   Skin:  Negative for dry skin.   Musculoskeletal:  Positive for arthritis, joint pain, joint swelling and stiffness. Negative for back pain, gout and neck pain.   Gastrointestinal:  Negative for abdominal pain.   Genitourinary:  Negative for bladder incontinence.   Neurological:  Negative for numbness, paresthesias and sensory change.   Psychiatric/Behavioral:  Negative for altered mental status.        Objective:   Body mass index is 42.42 kg/m².  There were no vitals filed for this visit.       General    Constitutional: She is oriented to person, place, and time. She appears well-developed.   HENT:   Head: Atraumatic.   Eyes: EOM are normal.   Pulmonary/Chest: Effort normal.   Neurological: She is alert and oriented to person, place, and time.   Psychiatric: Judgment normal.             Ambulating without assistive devices with a slight limp   Pelvis is level   Bilateral hips full motion no pain in the groin.  No pain over the  greater troch  Hip flexors and abductors and adductors and quads and hamstrings and ankle extensors and flexors are 5/5 in even bilaterally   Right knee with crepitus to compression on the patella and severe pain.  There is pain medially to palpation.  There is varus deformity of around 7°.  Active motion 0-115°.  No defect in the patella or quadriceps tendon.  Mild to moderate swelling.  Stable to varus and valgus stressing in anterior drawer  Left knee with crepitus to compression on the patella moderate discomfort.  There is pain medially to palpation.  Varus deformity also around 7°.  Active motion 0-125 degrees.  No defect in the patella or quadriceps tendon.  Mild swelling.  Stable to varus and valgus stressing and anterior drawer is negative   Calves are soft nontender with very mild varicosities   Ankle motion is intact able to bring the ankle up and down and strength is 5/5 with the extension and flexion  Skin is warm to touch no obvious lesions.      Relevant imaging results reviewed and interpreted by me, discussed with the patient and / or family today   X-ray 10/03/2024 bilateral knees AP standing standing with flexion laterals and sunrise.  Complete loss of medial joint space with marginal osteophytic changes with also severe arthritic changes underneath the patella bilaterally.  There is varus deformity.  Findings consistent with arthritis.  No fracture seen  Assessment:     Encounter Diagnoses   Name Primary?    Arthritis of knee, right Yes    Arthritis of knee, left     Acquired varus deformity knee, right     Acquired varus deformity knee, left     Morbid obesity with BMI of 40.0-44.9, adult         Plan:   Arthritis of knee, right    Arthritis of knee, left  -     Large Joint Aspiration/Injection: L knee    Acquired varus deformity knee, right    Acquired varus deformity knee, left    Morbid obesity with BMI of 40.0-44.9, adult         Patient Instructions    total knee or total hip replacement is  considered outpatient surgery by the government right now.  You will have your surgery under spinal or general anesthetic.    It will take roughly an hour and half to 2 to perform.  You will be going home the same day after surgery.    We will get you up and walking an hour or so after surgery in recovery room and will arrange for home health and physical therapy to come to your house by the next day.    You will receive home health and home physical therapy for 2 weeks and then outpatient after that.   You would need to have family members to help you at home otherwise you can not have surgery because this is considered outpatient by the government   It will take roughly 3-6 months for complete healing to occur  Most joint replacements studies have shown improvement up to 18 months after surgery  There is a mandatory class that you have to attend prior to surgery where you will be given instructions by therapist, nurse, home health  Cardiac clearance prior to having surgery/the heart doctor will decide your risks involved having surgery and if you can withstand the operation      Procedure, common risks and benefits,alternatives discussed in details.  All questions answered.  Patient expressed understanding.  Patient instructed to call for any questions that could arise in the future.     Most common Risks:  Infection/2%  Leg-length discrepancy/most common with total hip replacement  Dislocation/most common with total hip replacement/2% risk of the hip coming out of the socket.  If that happens you would need to have it pulled back under sedation.  If this continues to happen recurrent dislocation you may need repeated surgery  Neuro-vascular injury ( resulting in loss of motor and sensory functions)  you have a slight increase risk of having what we call peroneal nerve palsy which results in a footdrop.  70-80% of foot drops recuperate within 6 months.  Almost all people with total knee replacement are slightly  numb on the outside of the knee.    Pain  Blood clot  Fat clot  Loss of motion.  Total knees have the tendency to lose motion if you do not exercise and do therapy.  You have to work through the pain to achieve motion  Fracture of bone  Failure of procedure to achieve its intended purpose.  Total knees are 80% successful in decreasing pain increasing function.  Total hips are 90% successful in decreasing pain and increasing function  Failure of hardware/they last approximately 15 years/prosthesis is made out of metal and plastic they could wear out with time  Non-union or mal-union of bone  Malalignment  Death/you go see cardiologist before surgery   Already cleared by Cardiology Dr. Fine    Patient instructions for joint replacement    Before surgery  1.  Shower with Hibiclens soap/antibacterial for 3 days prior to surgery to decrease risk of infection  2.  Stop all blood thinners/aspirin, Coumadin, warfarin, Plavix, Eliquis, Xarelto etc 5 days prior to surgery  3.  No eating or drinking after midnight before surgery. Would like you to drink a bottle of Powerade, Gatorade, or Pedialyte the day before surgery prior to midnight, so that you do not get dehydrated waiting for surgery the next day.  4.  Take Tylenol 650 mg the night prior to surgery.  5.  Stop all semaglutides (Monjauro, Ozempic, Trulicity, Wegovy, etc.). 7-10 days prior to surgery.  6. Stop all NSAIDs (Motrin, ibuprofen, Aleve, Mobic, Celebrex, Voltaren, nabumetone, Relafen, sulindac etc.), all natural products, and vitamins except vitamin D for 7-10 days prior to surgery.    Ask physicians for prescription of Celebrex or Mobic if needed    After surgery at home  1.  Take Tylenol 650 mg 3 times a day for 14 days then as needed for mild pain.  You may resume all your home medications the 1st day after surgery  2.  Take gabapentin 300 mg nightly for 6 weeks/if you are on pregabalin or Lyrica you can use that instead  3.  Take Celebrex 200 mg or  meloxicam 15 mg daily-you may substitute with Aleve 2 tablets twice a day with food or ibuprofen 600 mg twice a day with food or any other anti-inflammatory if you were on prior to surgery for 6 weeks unless having cardiac issues to take for 2 weeks only  4.  Must take aspirin 81 mg twice a day for 6 weeks unless you are on other blood thinners/Plavix, Eliquis, Xarelto, Coumadin etc to start the next day after surgery  5.  Must wear compressive stockings for 6 weeks minimum to decrease the risk of blood clot and swelling  6.  Hydrocodone/Norco or oxycodone/Percocet will be prescribed to take every 6 hr as needed for breakthrough pain/you may cut pills in half.  You will also have something for nausea to take on as needed will be prescribed  7.  May apply ice on the knee to help with decreasing pain  8.  Keep wound dry for 2 weeks  than you will be allowed to shower in 24 hr and get the wound wet.  No soaking of the wound in the tub or swimming for 4 weeks after surgery  9.  No driving for 4 weeks unless specified by physician  10.  Avoid touching the wound or surrounding area /at least 2 inches on each side of the surgical incision until staples are removed/stitches   11.  May change the surgical dressing if extremely bloody or has drainage on it. May clean the wound with peroxide or Betadine and apply sterile dressing and Ace wrap over it  12.  Leave hospital dressing on for 3 days then may change by applying sterile 4 x 4 and Ace wrap after cleaning with Betadine or peroxide.  May leave this dressing change to home health nurses    Nickel allergy we need [facility] total knee replacement  Proceed with the right total knee replacement   Already have a walker  She already has a shower chair                                   The right knee is the 1 that hurts the most.  She is contemplating 1st part of next year to undergo total knee replacement.  If he she can hold off any longer she will try.  Will try  meloxicam to see if that helps.  Went over pros and cons of surgery in details.  We will discuss much more in the future and if she has any questions will answered it.  We did discuss getting cardiac clearance and we will try to get her to see cardiologist within the next 10 weeks.  She did receive steroid injections today by another provider and she needs to wait 3 months before undergoing total knee because the risk of infection will go up  She would need a walker after surgery after surgery       The mobility limitation can not be sufficiently resolved by the use of a cane.  Patient's functional mobility deficit can be sufficiently resolved with the use of a rolling walker.  Patient has mobility limitation significantly impairs their ability to participate in 1 or more activities of daily living.  The use of a rolling walker we will significantly improve the patient's ability to participate in  MRADLS and it is to be used on a regular basis in the home.        Allergic to nickel we will use [facility]  [facility]    Disclaimer: This note was prepared using a voice recognition system and is likely to have sound alike errors within the text.